# Patient Record
Sex: FEMALE | Race: OTHER | HISPANIC OR LATINO | Employment: UNEMPLOYED | URBAN - NONMETROPOLITAN AREA
[De-identification: names, ages, dates, MRNs, and addresses within clinical notes are randomized per-mention and may not be internally consistent; named-entity substitution may affect disease eponyms.]

---

## 2019-07-27 ENCOUNTER — HOSPITAL ENCOUNTER (EMERGENCY)
Facility: HOSPITAL | Age: 29
Discharge: HOME | End: 2019-07-27
Attending: EMERGENCY MEDICINE | Admitting: EMERGENCY MEDICINE
Payer: COMMERCIAL

## 2019-07-27 VITALS
HEART RATE: 98 BPM | OXYGEN SATURATION: 98 % | SYSTOLIC BLOOD PRESSURE: 124 MMHG | DIASTOLIC BLOOD PRESSURE: 68 MMHG | RESPIRATION RATE: 18 BRPM | WEIGHT: 183.86 LBS

## 2019-07-27 DIAGNOSIS — T63.441A BEE STING REACTION: Primary | ICD-10-CM

## 2019-07-27 LAB
EXT PREG TEST URINE: NEGATIVE
EXT. CONTROL ED NAV: NORMAL

## 2019-07-27 PROCEDURE — 99283 EMERGENCY DEPT VISIT LOW MDM: CPT

## 2019-07-27 PROCEDURE — 96375 TX/PRO/DX INJ NEW DRUG ADDON: CPT

## 2019-07-27 PROCEDURE — 99284 EMERGENCY DEPT VISIT MOD MDM: CPT | Performed by: EMERGENCY MEDICINE

## 2019-07-27 PROCEDURE — 81025 URINE PREGNANCY TEST: CPT | Performed by: EMERGENCY MEDICINE

## 2019-07-27 PROCEDURE — 96374 THER/PROPH/DIAG INJ IV PUSH: CPT

## 2019-07-27 RX ORDER — EPINEPHRINE 0.3 MG/.3ML
0.3 INJECTION SUBCUTANEOUS ONCE
Qty: 0.6 ML | Refills: 0 | Status: SHIPPED | OUTPATIENT
Start: 2019-07-27 | End: 2019-11-11

## 2019-07-27 RX ORDER — FAMOTIDINE 20 MG/1
20 TABLET, FILM COATED ORAL 2 TIMES DAILY
Qty: 30 TABLET | Refills: 0 | Status: SHIPPED | OUTPATIENT
Start: 2019-07-27 | End: 2019-11-11 | Stop reason: ALTCHOICE

## 2019-07-27 RX ORDER — DIPHENHYDRAMINE HYDROCHLORIDE 50 MG/ML
25 INJECTION INTRAMUSCULAR; INTRAVENOUS ONCE
Status: COMPLETED | OUTPATIENT
Start: 2019-07-27 | End: 2019-07-27

## 2019-07-27 RX ORDER — METHYLPREDNISOLONE SODIUM SUCCINATE 125 MG/2ML
125 INJECTION, POWDER, LYOPHILIZED, FOR SOLUTION INTRAMUSCULAR; INTRAVENOUS ONCE
Status: COMPLETED | OUTPATIENT
Start: 2019-07-27 | End: 2019-07-27

## 2019-07-27 RX ORDER — PREDNISONE 20 MG/1
60 TABLET ORAL DAILY
Qty: 12 TABLET | Refills: 0 | Status: SHIPPED | OUTPATIENT
Start: 2019-07-27 | End: 2019-07-31

## 2019-07-27 RX ADMIN — FAMOTIDINE 20 MG: 10 INJECTION, SOLUTION INTRAVENOUS at 17:56

## 2019-07-27 RX ADMIN — DIPHENHYDRAMINE HYDROCHLORIDE 25 MG: 50 INJECTION, SOLUTION INTRAMUSCULAR; INTRAVENOUS at 17:54

## 2019-07-27 RX ADMIN — METHYLPREDNISOLONE SODIUM SUCCINATE 125 MG: 125 INJECTION, POWDER, FOR SOLUTION INTRAMUSCULAR; INTRAVENOUS at 17:56

## 2019-07-27 NOTE — ED NOTES
No resp distress or complaints offered at this time   Dr Teodoro Nevarez discharged pt to home     Ivette Street RN  07/27/19 9916

## 2019-07-27 NOTE — ED PROVIDER NOTES
History  Chief Complaint   Patient presents with    Bee Sting     pt stung by bee at 1600 today used eopi pen which was  shakey right now no other symptoms     21-year-old female presents with bee sting to the right foot  Patient states [place] and was stung on the foot by a bee  Patient states that she was told that when she was a child she was stung on the head by a bee and had a allergic reaction   Since that time patient has been told to give herself an EpiPen if she ever gets tongue  Patient has never been sinus on a dull  Patient states she was stung at [facility] [address]  She managed off the EpiPen into the left thigh at approximately 1650  Patient has had no stridor no wheezing no shortness of breath  She did present minimally shaky secondary to the epinephrine  She has no rash or other signs or symptoms of allergic reaction      Insect Bite   Attacking animal: bee   Animal bite location: Right foot  Time since incident:  45 minutes  Pain details:     Quality:  Stinging    Severity:  Mild    Timing:  Constant  Incident location:  [place]  Tetanus status:  Up to date  Relieved by:  Cold compresses  Worsened by: Activity  Associated symptoms: no fever, no numbness, no rash and no swelling        None       Past Medical History:   Diagnosis Date    Asthma     Bipolar 1 disorder (HCC)     PTSD (post-traumatic stress disorder)        Past Surgical History:   Procedure Laterality Date    MOUTH SURGERY         History reviewed  No pertinent family history  I have reviewed and agree with the history as documented  Social History     Tobacco Use    Smoking status: Current Every Day Smoker    Smokeless tobacco: Never Used   Substance Use Topics    Alcohol use: Yes     Comment: social    Drug use: Never        Review of Systems   Constitutional: Negative  Negative for fever  HENT: Negative  Negative for congestion, dental problem and drooling  Eyes: Negative  Negative for pain, discharge and itching  Respiratory: Negative  Negative for apnea, choking and chest tightness  Cardiovascular: Negative  Negative for chest pain and leg swelling  Gastrointestinal: Negative  Negative for abdominal distention, abdominal pain and anal bleeding  Endocrine: Negative  Negative for cold intolerance, heat intolerance and polydipsia  Genitourinary: Negative  Negative for difficulty urinating, dyspareunia and dysuria  Musculoskeletal: Negative  Negative for arthralgias, back pain and gait problem  Skin: Negative for rash  Anisha Alejandra area of raised skin to the foot of the right lower extremity   Allergic/Immunologic: Negative for environmental allergies and food allergies  Neurological: Negative for seizures, light-headedness and numbness  Hematological: Negative for adenopathy  Psychiatric/Behavioral: Negative  Negative for agitation, behavioral problems, confusion and decreased concentration  All other systems reviewed and are negative  Physical Exam  Physical Exam   Constitutional: She appears well-developed and well-nourished  HENT:   Head: Normocephalic  Right Ear: External ear normal    Left Ear: External ear normal    Eyes: Pupils are equal, round, and reactive to light  Right eye exhibits no discharge  Left eye exhibits no discharge  Neck: Normal range of motion  No JVD present  No tracheal deviation present  No thyromegaly present  Cardiovascular: Normal rate, regular rhythm and normal heart sounds  Pulmonary/Chest: Effort normal  No stridor  No respiratory distress  She has no wheezes  Abdominal: Soft  She exhibits no distension  There is no tenderness  There is no guarding  Neurological: She displays normal reflexes  No cranial nerve deficit  She exhibits normal muscle tone  Coordination normal    Skin: Capillary refill takes less than 2 seconds  There is erythema     Patient with a dime-sized area of swelling to the right foot   No urticaria   Psychiatric: She has a normal mood and affect  Her behavior is normal    Vitals reviewed  Vital Signs  ED Triage Vitals [07/27/19 1722]   Temp Pulse Respirations Blood Pressure SpO2   -- (!) 116 18 132/75 98 %      Temp src Heart Rate Source Patient Position - Orthostatic VS BP Location FiO2 (%)   -- Right Sitting Right arm --      Pain Score       No Pain           Vitals:    07/27/19 1722   BP: 132/75   Pulse: (!) 116   Patient Position - Orthostatic VS: Sitting         Visual Acuity      ED Medications  Medications   methylPREDNISolone sodium succinate (Solu-MEDROL) injection 125 mg (125 mg Intravenous Given 7/27/19 1756)   diphenhydrAMINE (BENADRYL) injection 25 mg (25 mg Intravenous Given 7/27/19 1754)   famotidine (PEPCID) injection 20 mg (20 mg Intravenous Given 7/27/19 1756)       Diagnostic Studies  Results Reviewed     Procedure Component Value Units Date/Time    POCT pregnancy, urine [[id number]]  (Normal) Resulted:  07/27/19 1748    Lab Status:  Final result Updated:  07/27/19 1749     EXT PREG TEST UR (Ref: Negative) negative     Control valid                 No orders to display              Procedures  Procedures       ED Course                               MDM  Number of Diagnoses or Management Options  Bee sting reaction:   Diagnosis management comments: Patient is completely hemodynamically stable  She exhibits no signs or symptoms of anaphylaxis upon arrival     1754  Patient is completely asymptomatic 1 hour after give herself the epinephrine  Patient has no rash  She has no facial swelling  She has no stridor  She has no wheezing  18 30  Patient is asymptomatic almost 2 hours after bee sting  I will discharge her home        Disposition  Final diagnoses:   Bee sting reaction     Time reflects when diagnosis was documented in both MDM as applicable and the Disposition within this note     Time User Action Codes Description Comment    7/27/2019  5:25 PM David Bender Suki Levi Add [O19 020A] Bee sting reaction       ED Disposition     ED Disposition Condition Date/Time Comment    Discharge Stable Sat Jul 27, 2019  5:25 PM Jorge Hearn discharge to home/self care  Follow-up Information    None         Patient's Medications   Discharge Prescriptions    EPINEPHRINE (EPIPEN) 0 3 MG/0 3 ML SOAJ    Inject 0 3 mL (0 3 mg total) into a muscle once for 1 dose       Start Date: 7/27/2019 End Date: 7/27/2019       Order Dose: 0 3 mg       Quantity: 0 6 mL    Refills: 0    FAMOTIDINE (PEPCID) 20 MG TABLET    Take 1 tablet (20 mg total) by mouth 2 (two) times a day for 5 days       Start Date: 7/27/2019 End Date: 8/1/2019       Order Dose: 20 mg       Quantity: 30 tablet    Refills: 0    PREDNISONE 20 MG TABLET    Take 3 tablets (60 mg total) by mouth daily for 4 days       Start Date: 7/27/2019 End Date: 7/31/2019       Order Dose: 60 mg       Quantity: 12 tablet    Refills: 0     No discharge procedures on file      ED Provider  Electronically Signed by           Rylie Kingston DO  07/27/19 9864

## 2019-11-11 ENCOUNTER — OFFICE VISIT (OUTPATIENT)
Dept: FAMILY MEDICINE CLINIC | Facility: CLINIC | Age: 29
End: 2019-11-11
Payer: COMMERCIAL

## 2019-11-11 ENCOUNTER — APPOINTMENT (OUTPATIENT)
Dept: LAB | Facility: MEDICAL CENTER | Age: 29
End: 2019-11-11
Payer: COMMERCIAL

## 2019-11-11 VITALS
BODY MASS INDEX: 35.37 KG/M2 | WEIGHT: 192.2 LBS | DIASTOLIC BLOOD PRESSURE: 68 MMHG | HEIGHT: 62 IN | SYSTOLIC BLOOD PRESSURE: 100 MMHG

## 2019-11-11 DIAGNOSIS — T63.441A BEE STING REACTION: ICD-10-CM

## 2019-11-11 DIAGNOSIS — R10.30 LOWER ABDOMINAL PAIN: Primary | ICD-10-CM

## 2019-11-11 DIAGNOSIS — Z12.4 SCREENING FOR CERVICAL CANCER: ICD-10-CM

## 2019-11-11 DIAGNOSIS — Z91.030 BEE STING ALLERGY: ICD-10-CM

## 2019-11-11 DIAGNOSIS — Z13.6 SCREENING FOR CARDIOVASCULAR CONDITION: ICD-10-CM

## 2019-11-11 DIAGNOSIS — Z23 ENCOUNTER FOR IMMUNIZATION: ICD-10-CM

## 2019-11-11 DIAGNOSIS — R63.5 WEIGHT GAIN: ICD-10-CM

## 2019-11-11 DIAGNOSIS — F31.61 BIPOLAR DISORDER, CURRENT EPISODE MIXED, MILD (HCC): ICD-10-CM

## 2019-11-11 LAB
ALBUMIN SERPL BCP-MCNC: 4.3 G/DL (ref 3.5–5)
ALP SERPL-CCNC: 66 U/L (ref 46–116)
ALT SERPL W P-5'-P-CCNC: 20 U/L (ref 12–78)
ANION GAP SERPL CALCULATED.3IONS-SCNC: 5 MMOL/L (ref 4–13)
AST SERPL W P-5'-P-CCNC: 12 U/L (ref 5–45)
BASOPHILS # BLD AUTO: 0.03 THOUSANDS/ΜL (ref 0–0.1)
BASOPHILS NFR BLD AUTO: 0 % (ref 0–1)
BILIRUB SERPL-MCNC: 0.31 MG/DL (ref 0.2–1)
BILIRUB UR QL STRIP: NEGATIVE
BUN SERPL-MCNC: 10 MG/DL (ref 5–25)
CALCIUM SERPL-MCNC: 8.5 MG/DL (ref 8.3–10.1)
CHLORIDE SERPL-SCNC: 112 MMOL/L (ref 100–108)
CHOLEST SERPL-MCNC: 142 MG/DL (ref 50–200)
CLARITY UR: CLEAR
CO2 SERPL-SCNC: 24 MMOL/L (ref 21–32)
COLOR UR: YELLOW
CREAT SERPL-MCNC: 0.73 MG/DL (ref 0.6–1.3)
EOSINOPHIL # BLD AUTO: 0.03 THOUSAND/ΜL (ref 0–0.61)
EOSINOPHIL NFR BLD AUTO: 0 % (ref 0–6)
ERYTHROCYTE [DISTWIDTH] IN BLOOD BY AUTOMATED COUNT: 12.1 % (ref 11.6–15.1)
EST. AVERAGE GLUCOSE BLD GHB EST-MCNC: 100 MG/DL
GFR SERPL CREATININE-BSD FRML MDRD: 114 ML/MIN/1.73SQ M
GLUCOSE P FAST SERPL-MCNC: 86 MG/DL (ref 65–99)
GLUCOSE UR STRIP-MCNC: NEGATIVE MG/DL
HBA1C MFR BLD: 5.1 % (ref 4.2–6.3)
HCG SERPL QL: NEGATIVE
HCT VFR BLD AUTO: 41.2 % (ref 34.8–46.1)
HDLC SERPL-MCNC: 43 MG/DL
HGB BLD-MCNC: 13.8 G/DL (ref 11.5–15.4)
HGB UR QL STRIP.AUTO: NEGATIVE
IMM GRANULOCYTES # BLD AUTO: 0.02 THOUSAND/UL (ref 0–0.2)
IMM GRANULOCYTES NFR BLD AUTO: 0 % (ref 0–2)
KETONES UR STRIP-MCNC: NEGATIVE MG/DL
LDLC SERPL CALC-MCNC: 75 MG/DL (ref 0–100)
LEUKOCYTE ESTERASE UR QL STRIP: NEGATIVE
LIPASE SERPL-CCNC: 105 U/L (ref 73–393)
LYMPHOCYTES # BLD AUTO: 2.91 THOUSANDS/ΜL (ref 0.6–4.47)
LYMPHOCYTES NFR BLD AUTO: 34 % (ref 14–44)
MCH RBC QN AUTO: 30.1 PG (ref 26.8–34.3)
MCHC RBC AUTO-ENTMCNC: 33.5 G/DL (ref 31.4–37.4)
MCV RBC AUTO: 90 FL (ref 82–98)
MONOCYTES # BLD AUTO: 0.57 THOUSAND/ΜL (ref 0.17–1.22)
MONOCYTES NFR BLD AUTO: 7 % (ref 4–12)
NEUTROPHILS # BLD AUTO: 5.08 THOUSANDS/ΜL (ref 1.85–7.62)
NEUTS SEG NFR BLD AUTO: 59 % (ref 43–75)
NITRITE UR QL STRIP: NEGATIVE
NRBC BLD AUTO-RTO: 0 /100 WBCS
PH UR STRIP.AUTO: 6 [PH]
PLATELET # BLD AUTO: 186 THOUSANDS/UL (ref 149–390)
PMV BLD AUTO: 11.4 FL (ref 8.9–12.7)
POTASSIUM SERPL-SCNC: 3.9 MMOL/L (ref 3.5–5.3)
PROT SERPL-MCNC: 7.2 G/DL (ref 6.4–8.2)
PROT UR STRIP-MCNC: NEGATIVE MG/DL
RBC # BLD AUTO: 4.58 MILLION/UL (ref 3.81–5.12)
SODIUM SERPL-SCNC: 141 MMOL/L (ref 136–145)
SP GR UR STRIP.AUTO: 1.02 (ref 1–1.03)
TRIGL SERPL-MCNC: 121 MG/DL
TSH SERPL DL<=0.05 MIU/L-ACNC: 1.17 UIU/ML (ref 0.36–3.74)
UROBILINOGEN UR QL STRIP.AUTO: 0.2 E.U./DL
WBC # BLD AUTO: 8.64 THOUSAND/UL (ref 4.31–10.16)

## 2019-11-11 PROCEDURE — 84703 CHORIONIC GONADOTROPIN ASSAY: CPT | Performed by: FAMILY MEDICINE

## 2019-11-11 PROCEDURE — 85025 COMPLETE CBC W/AUTO DIFF WBC: CPT | Performed by: FAMILY MEDICINE

## 2019-11-11 PROCEDURE — 3044F HG A1C LEVEL LT 7.0%: CPT | Performed by: FAMILY MEDICINE

## 2019-11-11 PROCEDURE — 36415 COLL VENOUS BLD VENIPUNCTURE: CPT | Performed by: FAMILY MEDICINE

## 2019-11-11 PROCEDURE — 81003 URINALYSIS AUTO W/O SCOPE: CPT

## 2019-11-11 PROCEDURE — G0008 ADMIN INFLUENZA VIRUS VAC: HCPCS | Performed by: FAMILY MEDICINE

## 2019-11-11 PROCEDURE — 99203 OFFICE O/P NEW LOW 30 MIN: CPT | Performed by: FAMILY MEDICINE

## 2019-11-11 PROCEDURE — 3725F SCREEN DEPRESSION PERFORMED: CPT | Performed by: FAMILY MEDICINE

## 2019-11-11 PROCEDURE — 84443 ASSAY THYROID STIM HORMONE: CPT | Performed by: FAMILY MEDICINE

## 2019-11-11 PROCEDURE — 83690 ASSAY OF LIPASE: CPT | Performed by: FAMILY MEDICINE

## 2019-11-11 PROCEDURE — 80053 COMPREHEN METABOLIC PANEL: CPT | Performed by: FAMILY MEDICINE

## 2019-11-11 PROCEDURE — 90686 IIV4 VACC NO PRSV 0.5 ML IM: CPT | Performed by: FAMILY MEDICINE

## 2019-11-11 PROCEDURE — 80061 LIPID PANEL: CPT | Performed by: FAMILY MEDICINE

## 2019-11-11 PROCEDURE — 83036 HEMOGLOBIN GLYCOSYLATED A1C: CPT | Performed by: FAMILY MEDICINE

## 2019-11-11 RX ORDER — LAMOTRIGINE 100 MG/1
TABLET ORAL
Qty: 45 TABLET | Refills: 1 | Status: SHIPPED | OUTPATIENT
Start: 2019-11-11 | End: 2021-02-04

## 2019-11-11 RX ORDER — EPINEPHRINE 0.3 MG/.3ML
0.3 INJECTION SUBCUTANEOUS ONCE
Qty: 0.6 ML | Refills: 0 | Status: SHIPPED | OUTPATIENT
Start: 2019-11-11 | End: 2021-02-04

## 2019-11-11 RX ORDER — LAMOTRIGINE 100 MG/1
TABLET ORAL
COMMUNITY
Start: 2018-08-02 | End: 2019-11-11

## 2019-11-11 RX ORDER — ALBUTEROL SULFATE 90 UG/1
2 AEROSOL, METERED RESPIRATORY (INHALATION)
COMMUNITY
End: 2019-11-11

## 2019-11-11 RX ORDER — EPINEPHRINE 0.3 MG/.3ML
0.3 INJECTION SUBCUTANEOUS ONCE
Qty: 0.6 ML | Refills: 0 | Status: SHIPPED | OUTPATIENT
Start: 2019-11-11 | End: 2019-11-11

## 2019-11-11 RX ORDER — ALBUTEROL SULFATE 90 UG/1
2 AEROSOL, METERED RESPIRATORY (INHALATION) EVERY 6 HOURS PRN
Qty: 18 G | Refills: 1 | Status: SHIPPED | OUTPATIENT
Start: 2019-11-11 | End: 2021-02-04

## 2019-11-11 NOTE — PROGRESS NOTES
Assessment/Plan: We will get blood work on her and an abdominal/pelvic ultrasound  Refer to gyn  We will call her with results and make further recommendations at that time  We will see her back in the next couple weeks or p r n  Tej Campanile Flu shot given today    Problem List Items Addressed This Visit        Other    Bipolar disorder, current episode mixed, mild (HCC)    Relevant Medications    lamoTRIgine (LaMICtal) 100 mg tablet    Bee sting allergy      Other Visit Diagnoses     Lower abdominal pain    -  Primary    Relevant Orders    CBC and differential    Hemoglobin A1C    Lipase    Pregnancy Test (HCG Qualitative)    US abdomen and pelvis with transvaginal    UA (URINE) with reflex to Scope    Screening for cervical cancer        Relevant Orders    Ambulatory referral to Gynecology    Encounter for immunization        Relevant Orders    influenza vaccine, [id number], quadrivalent, 0 5 mL, preservative-free, for adult and pediatric patients 6 mos+ (AFLURIA, FLUARIX, FLULAVAL, FLUZONE) (Completed)    Screening for cardiovascular condition        Relevant Orders    Comprehensive metabolic panel    Lipid Panel with Direct LDL reflex    Weight gain        Relevant Orders    Hemoglobin A1C    TSH, 3rd generation with Free T4 reflex    Bee sting reaction        Relevant Medications    EPINEPHrine (EPIPEN) 0 3 mg/0 3 mL SOAJ    albuterol (PROVENTIL HFA,VENTOLIN HFA) 90 mcg/act inhaler           Diagnoses and all orders for this visit:    Lower abdominal pain  -     CBC and differential  -     Hemoglobin A1C  -     Lipase  -     Pregnancy Test (HCG Qualitative)  -     US abdomen and pelvis with transvaginal; Future  -     UA (URINE) with reflex to Scope    Bipolar disorder, current episode mixed, mild (HCC)  -     lamoTRIgine (LaMICtal) 100 mg tablet; 1/2 tab po q daily for 2 weeks, then 1 tab po q daily for 1 week, then 1 5 tabs po q daily    Screening for cervical cancer  -     Ambulatory referral to Gynecology; Future    Encounter for immunization  -     influenza vaccine, [id number], quadrivalent, 0 5 mL, preservative-free, for adult and pediatric patients 6 mos+ (AFLURIA, FLUARIX, FLULAVAL, [address])    Screening for cardiovascular condition  -     Comprehensive metabolic panel  -     Lipid Panel with Direct LDL reflex    Weight gain  -     Hemoglobin A1C  -     TSH, 3rd generation with Free T4 reflex    Bee sting reaction  -     EPINEPHrine (EPIPEN) 0 3 mg/0 3 mL SOAJ; Inject 0 3 mL (0 3 mg total) into a muscle once for 1 dose  -     albuterol (PROVENTIL HFA,VENTOLIN HFA) 90 mcg/act inhaler; Inhale 2 puffs every 6 (six) hours as needed for wheezing or shortness of breath    Bee sting allergy    Other orders  -     Discontinue: albuterol (PROVENTIL HFA,VENTOLIN HFA) 90 mcg/act inhaler; Inhale 2 puffs  -     Discontinue: lamoTRIgine (LaMICtal) 100 mg tablet; 1/2 TAB DAILY X2 WEEKS THEN ONE TAB DAILY X2 WEEKS THEN INCREASE TO 1 & 1/2 TABS DAILY  -     etonogestrel (NEXPLANON) subdermal implant; 68 mg by Subdermal route once        No problem-specific Assessment & Plan notes found for this encounter  Subjective:      Patient ID: Angelic Amado is a 34 y o  female  New patient  Has a history of bipolar disorder, bee sting allergy  Patient was on Lamictal, which she states helped her a lot  She has not been on it since she moved here in August   Patient states for the last couple months has been getting lower abdominal pain almost daily  She states it is sharp in nature  No dysuria or hematuria  No change in bowels  She cannot lay on her stomach because it bothers her  Patient also states feels dizzy after eating at times  Strong family history of diabetes in her family  Abdominal Pain   This is a new problem  The current episode started more than 1 month ago  The problem occurs daily  The problem has been unchanged  The pain is located in the LLQ and RLQ  The pain is moderate   The quality of the pain is sharp  Pain radiation: The whole abdomen  Associated symptoms include nausea  Pertinent negatives include no constipation, diarrhea, dysuria, fever, frequency or hematuria  The pain is aggravated by certain positions and eating  The pain is relieved by nothing  She has tried nothing for the symptoms  The following portions of the patient's history were reviewed and updated as appropriate:   She has a past medical history of Asthma, Bipolar 1 disorder ([address] ), Closed right radial fracture, High-risk pregnancy, and PTSD (post-traumatic stress disorder)  ,  does not have any pertinent problems on file  ,   has a past surgical history that includes Mouth surgery and Colonoscopy  ,  family history includes Cancer in her paternal grandmother; Diabetes in her maternal grandmother, maternal uncle, and mother; Heart attack in her mother; Heart disease in her maternal uncle and mother; Hypertension in her maternal grandmother and mother; Mental illness in her father  ,   reports that she has been smoking  She has never used smokeless tobacco  She reports that she drinks alcohol  She reports that she does not use drugs  ,  is allergic to bee venom and bupropion     Current Outpatient Medications   Medication Sig Dispense Refill    EPINEPHrine (EPIPEN) 0 3 mg/0 3 mL SOAJ Inject 0 3 mL (0 3 mg total) into a muscle once for 1 dose 0 6 mL 0    etonogestrel (NEXPLANON) subdermal implant 68 mg by Subdermal route once      albuterol (PROVENTIL HFA,VENTOLIN HFA) 90 mcg/act inhaler Inhale 2 puffs every 6 (six) hours as needed for wheezing or shortness of breath 18 g 1    lamoTRIgine (LaMICtal) 100 mg tablet 1/2 tab po q daily for 2 weeks, then 1 tab po q daily for 1 week, then 1 5 tabs po q daily 45 tablet 1     No current facility-administered medications for this visit  Review of Systems   Constitutional: Negative  Negative for fever  Respiratory: Negative  Cardiovascular: Negative      Gastrointestinal: Positive for abdominal pain and nausea  Negative for constipation and diarrhea  Genitourinary: Negative  Negative for dysuria, frequency and hematuria  Objective:  Vitals:    11/11/19 1204   BP: 100/68   Weight: 87 2 kg (192 lb 3 2 oz)   Height: 5' 2" (1 575 m)     Body mass index is 35 15 kg/m²  Physical Exam   Constitutional: She is oriented to person, place, and time  She appears well-developed and well-nourished  No distress  HENT:   Head: Normocephalic and atraumatic  Eyes: Conjunctivae are normal    Cardiovascular: Normal rate, regular rhythm and normal heart sounds  Exam reveals no gallop and no friction rub  No murmur heard  Pulmonary/Chest: Effort normal and breath sounds normal  No respiratory distress  She has no wheezes  She has no rales  Abdominal: She exhibits no mass  There is tenderness in the right lower quadrant and left lower quadrant  There is no rigidity  Musculoskeletal: She exhibits no edema  Neurological: She is alert and oriented to person, place, and time  Skin: She is not diaphoretic  Psychiatric: She has a normal mood and affect  Her behavior is normal  Judgment and thought content normal    Vitals reviewed

## 2019-11-14 ENCOUNTER — HOSPITAL ENCOUNTER (OUTPATIENT)
Dept: ULTRASOUND IMAGING | Facility: HOSPITAL | Age: 29
Discharge: HOME | End: 2019-11-14
Payer: COMMERCIAL

## 2019-11-14 DIAGNOSIS — R10.30 LOWER ABDOMINAL PAIN: ICD-10-CM

## 2019-11-14 PROCEDURE — 76856 US EXAM PELVIC COMPLETE: CPT

## 2019-11-14 PROCEDURE — 76830 TRANSVAGINAL US NON-OB: CPT

## 2019-11-14 PROCEDURE — 76700 US EXAM ABDOM COMPLETE: CPT

## 2019-12-18 ENCOUNTER — HOSPITAL ENCOUNTER (EMERGENCY)
Facility: HOSPITAL | Age: 29
Discharge: HOME | End: 2019-12-18
Attending: EMERGENCY MEDICINE | Admitting: EMERGENCY MEDICINE
Payer: COMMERCIAL

## 2019-12-18 VITALS
BODY MASS INDEX: 35.46 KG/M2 | WEIGHT: 192.68 LBS | DIASTOLIC BLOOD PRESSURE: 73 MMHG | OXYGEN SATURATION: 95 % | SYSTOLIC BLOOD PRESSURE: 113 MMHG | RESPIRATION RATE: 18 BRPM | TEMPERATURE: 97.4 F | HEIGHT: 62 IN | HEART RATE: 82 BPM

## 2019-12-18 DIAGNOSIS — L02.91 ABSCESS: Primary | ICD-10-CM

## 2019-12-18 PROCEDURE — 99283 EMERGENCY DEPT VISIT LOW MDM: CPT

## 2019-12-18 PROCEDURE — 99284 EMERGENCY DEPT VISIT MOD MDM: CPT | Performed by: EMERGENCY MEDICINE

## 2019-12-18 RX ORDER — NAPROXEN 500 MG/1
500 TABLET ORAL 2 TIMES DAILY WITH MEALS
Qty: 10 TABLET | Refills: 0 | Status: SHIPPED | OUTPATIENT
Start: 2019-12-18 | End: 2021-02-04 | Stop reason: ALTCHOICE

## 2019-12-18 RX ORDER — CEPHALEXIN 500 MG/1
500 CAPSULE ORAL EVERY 8 HOURS SCHEDULED
Qty: 30 CAPSULE | Refills: 0 | Status: SHIPPED | OUTPATIENT
Start: 2019-12-18 | End: 2019-12-28

## 2019-12-18 RX ORDER — NAPROXEN 500 MG/1
500 TABLET ORAL ONCE
Status: COMPLETED | OUTPATIENT
Start: 2019-12-18 | End: 2019-12-18

## 2019-12-18 RX ORDER — CEPHALEXIN 250 MG/1
500 CAPSULE ORAL ONCE
Status: COMPLETED | OUTPATIENT
Start: 2019-12-18 | End: 2019-12-18

## 2019-12-18 RX ADMIN — CEPHALEXIN 500 MG: 250 CAPSULE ORAL at 12:34

## 2019-12-18 RX ADMIN — NAPROXEN 500 MG: 500 TABLET ORAL at 12:34

## 2019-12-18 NOTE — ED NOTES
rain noted left groin area painful and red   No drainage noted at this time     Butch Ríos, RN  12/18/19 7584

## 2019-12-18 NOTE — ED PROVIDER NOTES
History  Chief Complaint   Patient presents with    Abscess     lump left groin     HPI     Pt presents from home, hx of bipolar disorder, recurrent "boils" who presents with a "boil in my groin "  Pt states she noticed a lump in her inguinal crease which gradually grew in size and was erythematous  She noticed from purulent drainage from the area  She has not yet applied warm compresses  She denies any other symptoms  Pt denies ha, fevers, cough, cp, sob, n/v/d/c, abd pain, dysuria, focal def or syncope  Prior to Admission Medications   Prescriptions Last Dose Informant Patient Reported? Taking? EPINEPHrine (EPIPEN) 0 3 mg/0 3 mL SOAJ   No No   Sig: Inject 0 3 mL (0 3 mg total) into a muscle once for 1 dose   albuterol (PROVENTIL HFA,VENTOLIN HFA) 90 mcg/act inhaler   No No   Sig: Inhale 2 puffs every 6 (six) hours as needed for wheezing or shortness of breath   etonogestrel (NEXPLANON) subdermal implant  Self Yes No   Si mg by Subdermal route once   lamoTRIgine (LaMICtal) 100 mg tablet   No No   Si/2 tab po q daily for 2 weeks, then 1 tab po q daily for 1 week, then 1 5 tabs po q daily      Facility-Administered Medications: None       Past Medical History:   Diagnosis Date    Asthma     Bipolar 1 disorder (HCC)     Closed right radial fracture     High-risk pregnancy     PTSD (post-traumatic stress disorder)        Past Surgical History:   Procedure Laterality Date    COLONOSCOPY      MOUTH SURGERY      WISDOM TOOTH EXTRACTION         Family History   Problem Relation Age of Onset    Diabetes Mother     Heart attack Mother     Heart disease Mother     Hypertension Mother     Mental illness Father     Diabetes Maternal Grandmother     Hypertension Maternal Grandmother     Cancer Paternal Grandmother     Diabetes Maternal Uncle     Heart disease Maternal Uncle      I have reviewed and agree with the history as documented      Social History     Tobacco Use    Smoking status: Current Every Day Smoker    Smokeless tobacco: Never Used   Substance Use Topics    Alcohol use: Yes     Frequency: Monthly or less     Drinks per session: 1 or 2     Binge frequency: Never     Comment: social    Drug use: Never        Review of Systems   Constitutional: Negative for activity change, appetite change, diaphoresis, fatigue and fever  HENT: Negative for congestion, facial swelling, mouth sores and trouble swallowing  Eyes: Negative for photophobia, discharge and visual disturbance  Respiratory: Negative for apnea, cough, shortness of breath and wheezing  Cardiovascular: Negative for chest pain and leg swelling  Gastrointestinal: Negative for abdominal pain, constipation, diarrhea, nausea and vomiting  Endocrine: Negative for heat intolerance and polydipsia  Genitourinary: Negative for dysuria, flank pain, frequency and hematuria  Musculoskeletal: Negative for back pain, gait problem, myalgias and neck pain  Skin: Positive for rash  Negative for wound  Allergic/Immunologic: Negative for immunocompromised state  Neurological: Negative for dizziness, syncope, weakness, light-headedness and headaches  Hematological: Negative for adenopathy  Psychiatric/Behavioral: Negative for agitation, confusion and self-injury  The patient is not nervous/anxious  Physical Exam  Physical Exam   Constitutional: She is oriented to person, place, and time  She appears well-developed and well-nourished  No distress  HENT:   Head: Normocephalic and atraumatic  Right Ear: External ear normal    Left Ear: External ear normal    Nose: Nose normal    Mouth/Throat: Oropharynx is clear and moist  No oropharyngeal exudate  Eyes: Pupils are equal, round, and reactive to light  Conjunctivae and EOM are normal  Right eye exhibits no discharge  Left eye exhibits no discharge  No scleral icterus  Neck: Normal range of motion  Neck supple  No JVD present  No tracheal deviation present   No thyromegaly present  Cardiovascular: Normal rate, regular rhythm and normal heart sounds  No murmur heard  Pulmonary/Chest: Effort normal and breath sounds normal  No stridor  No respiratory distress  She has no wheezes  She has no rales  She exhibits no tenderness  Abdominal: Soft  Bowel sounds are normal  She exhibits no distension and no mass  There is no tenderness  There is no rebound and no guarding  Musculoskeletal: Normal range of motion  She exhibits no edema, tenderness or deformity  Lymphadenopathy:     She has no cervical adenopathy  Neurological: She is alert and oriented to person, place, and time  She has normal reflexes  She displays normal reflexes  No cranial nerve deficit  She exhibits normal muscle tone  Coordination normal    Skin: Skin is warm and dry  Rash noted  She is not diaphoretic  No erythema  No pallor  Psychiatric: She has a normal mood and affect  Her behavior is normal  Judgment and thought content normal    Nursing note and vitals reviewed  Vital Signs  ED Triage Vitals [12/18/19 1115]   Temperature Pulse Respirations Blood Pressure SpO2   (!) 97 4 °F (36 3 °C) 82 18 113/73 95 %      Temp Source Heart Rate Source Patient Position - Orthostatic VS BP Location FiO2 (%)   Temporal Monitor -- Right arm --      Pain Score       3           Vitals:    12/18/19 1115   BP: 113/73   Pulse: 82         Visual Acuity      ED Medications  Medications   cephalexin (KEFLEX) capsule 500 mg (has no administration in time range)   naproxen (NAPROSYN) tablet 500 mg (has no administration in time range)       Diagnostic Studies  Results Reviewed     None                 No orders to display     Bedside ultrasound by the ED physician with the linear probe sows a 1x1cm superficial fluid collection over the area of the swelling likely an abscess collection             Procedures  Procedures         ED Course                               MDM  Number of Diagnoses or Management Options  Diagnosis management comments: IMP: folliculitis versus cellulitis, abscess of the inguinal fold  Doubt nec fascitis, deep space abscess or bacteremia  Plan: will give abx, offer I&D of the abscess  Pt will f/up w/ her pcp   - Pt is refusing an I&D at this time  She understands the signs and symptoms to return immediately to the ED, o/w she will f/up w/ her pcp  Amount and/or Complexity of Data Reviewed  Decide to obtain previous medical records or to obtain history from someone other than the patient: yes  Review and summarize past medical records: yes  Independent visualization of images, tracings, or specimens: yes    Risk of Complications, Morbidity, and/or Mortality  Presenting problems: high  Diagnostic procedures: low  Management options: low    Patient Progress  Patient progress: improved        Disposition  Final diagnoses:   Abscess     Time reflects when diagnosis was documented in both MDM as applicable and the Disposition within this note     Time User Action Codes Description Comment    12/18/2019 12:13 PM Edison Sheldon Add [L02 91] Abscess       ED Disposition     ED Disposition Condition Date/Time Comment    Discharge Stable Wed Dec 18, 2019 12:13  [address] discharge to home/self care              Follow-up Information     Follow up With Specialties Details Why [address], DO Family Medicine Schedule an appointment as soon as possible for a visit in 1 day Return immediately, If symptoms worsen [address] [facility]  [address]  [place] [address]  [phone]            Patient's Medications   Discharge Prescriptions    CEPHALEXIN (KEFLEX) 500 MG CAPSULE    Take 1 capsule (500 mg total) by mouth every 8 (eight) hours for 10 days       Start Date: 12/18/2019End Date: 12/28/2019       Order Dose: 500 mg       Quantity: 30 capsule    Refills: 0    NAPROXEN (NAPROSYN) 500 MG TABLET    Take 1 tablet (500 mg total) by mouth 2 (two) times a day with meals for 10 doses       Start Date: 12/18/2019End Date: 12/23/2019       Order Dose: 500 mg       Quantity: 10 tablet    Refills: 0     No discharge procedures on file      ED Provider  Electronically Signed by           Tawana Villalobos DO  12/18/19 9894

## 2021-02-04 ENCOUNTER — TELEPHONE (OUTPATIENT)
Dept: FAMILY MEDICINE CLINIC | Facility: CLINIC | Age: 31
End: 2021-02-04

## 2021-02-04 ENCOUNTER — TELEMEDICINE (OUTPATIENT)
Dept: FAMILY MEDICINE CLINIC | Facility: CLINIC | Age: 31
End: 2021-02-04
Payer: COMMERCIAL

## 2021-02-04 VITALS — BODY MASS INDEX: 34.57 KG/M2 | WEIGHT: 187.83 LBS | HEIGHT: 62 IN

## 2021-02-04 DIAGNOSIS — T63.441A BEE STING REACTION: ICD-10-CM

## 2021-02-04 DIAGNOSIS — F31.61 BIPOLAR DISORDER, CURRENT EPISODE MIXED, MILD (HCC): ICD-10-CM

## 2021-02-04 DIAGNOSIS — U07.1 COVID-19: Primary | ICD-10-CM

## 2021-02-04 PROBLEM — R16.0 ENLARGED LIVER: Status: ACTIVE | Noted: 2020-10-27

## 2021-02-04 PROCEDURE — 3008F BODY MASS INDEX DOCD: CPT | Performed by: FAMILY MEDICINE

## 2021-02-04 PROCEDURE — 99214 OFFICE O/P EST MOD 30 MIN: CPT | Performed by: FAMILY MEDICINE

## 2021-02-04 RX ORDER — EPINEPHRINE 0.3 MG/.3ML
0.3 INJECTION SUBCUTANEOUS ONCE
Qty: 0.6 ML | Refills: 0 | Status: CANCELLED | OUTPATIENT
Start: 2021-02-04 | End: 2021-02-04

## 2021-02-04 RX ORDER — ALBUTEROL SULFATE 90 UG/1
2 AEROSOL, METERED RESPIRATORY (INHALATION) EVERY 6 HOURS PRN
Qty: 18 G | Refills: 1 | Status: SHIPPED | OUTPATIENT
Start: 2021-02-04 | End: 2021-08-17

## 2021-02-04 RX ORDER — EPINEPHRINE 0.3 MG/.3ML
0.3 INJECTION SUBCUTANEOUS ONCE
Qty: 0.6 ML | Refills: 0 | Status: SHIPPED | OUTPATIENT
Start: 2021-02-04 | End: 2021-08-17

## 2021-02-04 RX ORDER — CLINDAMYCIN PHOSPHATE 11.9 MG/ML
1 SOLUTION TOPICAL
COMMUNITY
Start: 2020-10-16 | End: 2021-08-17

## 2021-02-04 RX ORDER — LAMOTRIGINE 100 MG/1
TABLET ORAL
Qty: 45 TABLET | Refills: 1 | Status: SHIPPED | OUTPATIENT
Start: 2021-02-04 | End: 2021-08-17

## 2021-02-04 NOTE — TELEPHONE ENCOUNTER
WORK TOLD HER SHE DOES NOT NEED TO BE TESTED AGAIN FOR COVID, SHE JUST NEEDS TO HAVE A NOTE FROM YOU THAT SHE IS CLEARED  CAN YOU DO A NOTE? E-MAIL TO HER AT [email or web address]

## 2021-02-04 NOTE — PROGRESS NOTES
COVID-19 Virtual Visit     Assessment/Plan:    Problem List Items Addressed This Visit        Other    Bipolar disorder, current episode mixed, mild (HCC)    Relevant Medications    lamoTRIgine (LaMICtal) 100 mg tablet      Other Visit Diagnoses     COVID-19    -  Primary    Relevant Orders    Novel Coronavirus (Covid-19),PCR [facility] - Collected at [facility] or [facility]    Bee sting reaction        Relevant Medications    albuterol (PROVENTIL HFA,VENTOLIN HFA) 90 mcg/act inhaler    EPINEPHrine (EPIPEN) 0 3 mg/0 3 mL SOAJ         Disposition:       If patient is doing well  Order put in for her to get another COVID test   She is cleared to go back to work since she has been symptom-free  Refilled her medications  We will see her back in the office in the next 1-2 months or p r n     I have spent 13 minutes directly with the patient  Encounter provider Meda Frankel, DO    Provider located at [address]    Recent Visits  No visits were found meeting these conditions  Showing recent visits within past 7 days and meeting all other requirements     Today's Visits  Date Type Provider Dept   02/04/21 Telemedicine Meda Frankel, DO [facility]   Showing today's visits and meeting all other requirements     Future Appointments  No visits were found meeting these conditions  Showing future appointments within next 150 days and meeting all other requirements      This virtual check-in was done via [employer] and patient was informed that this is not a secure, HIPAA-compliant platform  She agrees to proceed  Patient agrees to participate in a virtual check in via telephone or video visit instead of presenting to the office to address urgent/immediate medical needs  Patient is aware this is a billable service  After connecting through [facility], the patient was identified by name and date of birth   Ely Dawson was informed that this was a telemedicine visit and that the exam was being conducted confidentially over secure lines  My office door was closed  No one else was in the room  Bairon Culver acknowledged consent and understanding of privacy and security of the telemedicine visit  I informed the patient that I have reviewed her record in Epic and presented the opportunity for her to ask any questions regarding the visit today  The patient agreed to participate  Subjective:   Bairon Culver is a 31 y o  female who has been screened for COVID-19  Symptom change since last report: resolving  Patient's symptoms include fatigue ( resolved), anosmia ( resolved), loss of taste ( resolved) and headache ( resolved)  Patient denies fever, chills, congestion, cough, shortness of breath, nausea, vomiting and diarrhea  Fabian Leach has been staying home and has isolated themselves in her home  She is taking care to not share personal items and is cleaning all surfaces that are touched often, like counters, tabletops, and doorknobs using household cleaning sprays or wipes  She is wearing a mask when she leaves her room  Date of symptom onset: 1/24/2021  Date of positive COVID-19 PCR: 1/27/2021     Video visit  Patient started having symptoms on 01/24/2021  Patient went to urgent care on 01/27/2021 and tested positive for COVID  Patient has been doing well  She is regaining her taste and smell  She has no shortness of breath or any fever chills  No GI symptoms    Her work is requiring her to get a negative COVID test     Lab Results   Component Value Date    SARSCOV2 Positive (A) 01/27/2021     Past Medical History:   Diagnosis Date    Asthma     Bipolar 1 disorder ([facility] [address] )     Closed right radial fracture     High-risk pregnancy     PTSD (post-traumatic stress disorder)      Past Surgical History:   Procedure Laterality Date    COLONOSCOPY      MOUTH SURGERY      WISDOM TOOTH EXTRACTION       Current Outpatient Medications   Medication Sig Dispense Refill    albuterol (PROVENTIL HFA,VENTOLIN HFA) 90 mcg/act inhaler Inhale 2 puffs every 6 (six) hours as needed for wheezing or shortness of breath 18 g 1    clindamycin (CLEOCIN T) 1 % external solution Apply 1 application topically      etonogestrel (NEXPLANON) subdermal implant 68 mg by Subdermal route once      EPINEPHrine (EPIPEN) 0 3 mg/0 3 mL SOAJ Inject 0 3 mL (0 3 mg total) into a muscle once for 1 dose 0 6 mL 0    lamoTRIgine (LaMICtal) 100 mg tablet 1/2 tab po q daily for 2 weeks, then 1 tab po q daily for 1 week, then 1 5 tabs po q daily 45 tablet 1     No current facility-administered medications for this visit  Allergies   Allergen Reactions    Bee Venom     Bupropion Other (See Comments)     "shaky"       Review of Systems   Constitutional: Positive for fatigue ( resolved)  Negative for chills and fever  HENT: Negative for congestion  Respiratory: Negative for cough and shortness of breath  Cardiovascular: Negative  Gastrointestinal: Negative for diarrhea, nausea and vomiting  Genitourinary: Negative  Neurological: Positive for headaches ( resolved)  Objective:    Vitals:    02/04/21 0730   Weight: 85 2 kg (187 lb 13 3 oz)   Height: 5' 2" (1 575 m)       Physical Exam  Vitals signs reviewed  Constitutional:       General: She is not in acute distress  Appearance: Normal appearance  She is not ill-appearing or toxic-appearing  HENT:      Head: Normocephalic and atraumatic  Eyes:      Conjunctiva/sclera: Conjunctivae normal    Pulmonary:      Comments: Talking in full sentences in no distress  Neurological:      General: No focal deficit present  Mental Status: She is alert  Psychiatric:         Mood and Affect: Mood normal          Behavior: Behavior normal          Thought Content:  Thought content normal        VIRTUAL VISIT DISCLAIMER    Rafaela Lilo Mckeonnt acknowledges that she has consented to an online visit or consultation  She understands that the online visit is based solely on information provided by her, and that, in the absence of a face-to-face physical evaluation by the physician, the diagnosis she receives is both limited and provisional in terms of accuracy and completeness  This is not intended to replace a full medical face-to-face evaluation by the physician  Fei Trent understands and accepts these terms

## 2021-08-17 ENCOUNTER — OFFICE VISIT (OUTPATIENT)
Dept: FAMILY MEDICINE CLINIC | Facility: CLINIC | Age: 31
End: 2021-08-17
Payer: COMMERCIAL

## 2021-08-17 VITALS
WEIGHT: 190.8 LBS | HEIGHT: 62 IN | DIASTOLIC BLOOD PRESSURE: 68 MMHG | TEMPERATURE: 97.2 F | SYSTOLIC BLOOD PRESSURE: 106 MMHG | BODY MASS INDEX: 35.11 KG/M2

## 2021-08-17 DIAGNOSIS — T63.441A BEE STING REACTION: ICD-10-CM

## 2021-08-17 DIAGNOSIS — F31.61 BIPOLAR DISORDER, CURRENT EPISODE MIXED, MILD (HCC): ICD-10-CM

## 2021-08-17 DIAGNOSIS — J45.20 MILD INTERMITTENT ASTHMA WITHOUT COMPLICATION: ICD-10-CM

## 2021-08-17 DIAGNOSIS — S39.012D STRAIN OF LUMBAR REGION, SUBSEQUENT ENCOUNTER: Primary | ICD-10-CM

## 2021-08-17 DIAGNOSIS — Z91.030 BEE STING ALLERGY: ICD-10-CM

## 2021-08-17 PROBLEM — J45.909 ASTHMA: Status: ACTIVE | Noted: 2021-08-17

## 2021-08-17 PROCEDURE — 99214 OFFICE O/P EST MOD 30 MIN: CPT | Performed by: FAMILY MEDICINE

## 2021-08-17 PROCEDURE — 3008F BODY MASS INDEX DOCD: CPT | Performed by: FAMILY MEDICINE

## 2021-08-17 RX ORDER — EPINEPHRINE 0.3 MG/.3ML
0.3 INJECTION SUBCUTANEOUS ONCE
Qty: 0.6 ML | Refills: 1 | Status: SHIPPED | OUTPATIENT
Start: 2021-08-17 | End: 2021-08-17

## 2021-08-17 RX ORDER — EPINEPHRINE 0.3 MG/.3ML
0.3 INJECTION SUBCUTANEOUS ONCE
Qty: 2 EACH | Refills: 1 | Status: CANCELLED | OUTPATIENT
Start: 2021-08-17 | End: 2021-08-17

## 2021-08-17 RX ORDER — ALBUTEROL SULFATE 90 UG/1
2 AEROSOL, METERED RESPIRATORY (INHALATION) EVERY 6 HOURS PRN
Qty: 18 G | Refills: 1 | Status: SHIPPED | OUTPATIENT
Start: 2021-08-17

## 2021-08-17 RX ORDER — LAMOTRIGINE 100 MG/1
TABLET ORAL
Qty: 45 TABLET | Refills: 1 | Status: CANCELLED | OUTPATIENT
Start: 2021-08-17

## 2021-08-17 RX ORDER — IBUPROFEN 600 MG/1
600 TABLET ORAL
COMMUNITY
Start: 2021-08-12 | End: 2021-08-17

## 2021-08-17 RX ORDER — METHOCARBAMOL 500 MG/1
1000 TABLET, FILM COATED ORAL
COMMUNITY
Start: 2021-08-12 | End: 2021-08-17

## 2021-08-17 RX ORDER — LAMOTRIGINE 100 MG/1
TABLET ORAL
Qty: 45 TABLET | Refills: 3 | Status: SHIPPED | OUTPATIENT
Start: 2021-08-17

## 2021-08-17 NOTE — PROGRESS NOTES
Assessment/Plan:  Her low back strain, she will  the prescription for Robaxin  May use warmth on the area  Do stretching exercises  She will call symptoms continue or increase  We will start her back on her Lamictal   She will take 1 pill daily for 1 week, then 1-1/2 pills daily  Refilled her albuterol inhaler an EpiPen  We will see her back in 2 months or p r n     Problem List Items Addressed This Visit        Respiratory    Asthma    Relevant Medications    albuterol (PROVENTIL HFA,VENTOLIN HFA) 90 mcg/act inhaler       Other    Bipolar disorder, current episode mixed, mild (HCC)    Relevant Medications    lamoTRIgine (LaMICtal) 100 mg tablet    Bee sting allergy      Other Visit Diagnoses     Strain of lumbar region, subsequent encounter    -  Primary    Bee sting reaction        Relevant Medications    albuterol (PROVENTIL HFA,VENTOLIN HFA) 90 mcg/act inhaler    EPINEPHrine (EPIPEN) 0 3 mg/0 3 mL SOAJ           Diagnoses and all orders for this visit:    Strain of lumbar region, subsequent encounter    Bipolar disorder, current episode mixed, mild (HCC)  -     lamoTRIgine (LaMICtal) 100 mg tablet; 1 5 tabs po q daily    Mild intermittent asthma without complication    Bee sting allergy    Bee sting reaction  -     albuterol (PROVENTIL HFA,VENTOLIN HFA) 90 mcg/act inhaler; Inhale 2 puffs every 6 (six) hours as needed for wheezing or shortness of breath  -     EPINEPHrine (EPIPEN) 0 3 mg/0 3 mL SOAJ; Inject 0 3 mL (0 3 mg total) into a muscle once for 1 dose    Other orders  -     ibuprofen (MOTRIN) 600 mg tablet; 600 mg  -     methocarbamol (ROBAXIN) 500 mg tablet; 1,000 mg        No problem-specific Assessment & Plan notes found for this encounter  Subjective:      Patient ID: Es Malik is a 31 y o  female  Patient here today for follow-up from an ER visit  Patient picked up her sister wicho over the weekend bent backwards and felt her back go out    Went to the ER the next day because it was still hurting her  She was given Robaxin in the ER which did help  She has no radiation of pain down her legs or arms  No bowel or bladder problems  Patient also has been off her Lamictal   Would like to restart it  No SI or HI  Also needs a refill on EpiPen for her bee sting allergy and also albuterol inhaler for asthma    Back Pain  This is a new problem  The current episode started in the past 7 days  The problem occurs daily  The problem has been gradually improving since onset  The pain is present in the lumbar spine  The quality of the pain is described as aching  The pain does not radiate  The pain is mild  The symptoms are aggravated by bending, twisting and position  Stiffness is present all day  Pertinent negatives include no bladder incontinence, bowel incontinence or weakness  She has tried muscle relaxant and NSAIDs for the symptoms  The treatment provided moderate relief  Asthma  She complains of wheezing  This is a chronic problem  The problem occurs intermittently  The problem has been unchanged  Her symptoms are aggravated by pollen  Her symptoms are alleviated by beta-agonist  She reports significant improvement on treatment  Risk factors for lung disease include smoking/tobacco exposure  Her past medical history is significant for asthma  The following portions of the patient's history were reviewed and updated as appropriate:   She has a past medical history of Asthma, Bipolar 1 disorder ([address] ), Closed right radial fracture, High-risk pregnancy, and PTSD (post-traumatic stress disorder)  ,  does not have any pertinent problems on file  ,   has a past surgical history that includes Mouth surgery; Colonoscopy; and Stanton tooth extraction  ,  family history includes Cancer in her paternal grandmother; Diabetes in her maternal grandmother, maternal uncle, and mother; Heart attack in her mother; Heart disease in her maternal uncle and mother; Hypertension in her maternal grandmother and mother; Mental illness in her father  ,   reports that she has been smoking  She has never used smokeless tobacco  She reports current alcohol use  She reports that she does not use drugs  ,  is allergic to bee venom and bupropion     Current Outpatient Medications   Medication Sig Dispense Refill    albuterol (PROVENTIL HFA,VENTOLIN HFA) 90 mcg/act inhaler Inhale 2 puffs every 6 (six) hours as needed for wheezing or shortness of breath 18 g 1    lamoTRIgine (LaMICtal) 100 mg tablet 1 5 tabs po q daily 45 tablet 3    EPINEPHrine (EPIPEN) 0 3 mg/0 3 mL SOAJ Inject 0 3 mL (0 3 mg total) into a muscle once for 1 dose 0 6 mL 1    ibuprofen (MOTRIN) 600 mg tablet 600 mg      methocarbamol (ROBAXIN) 500 mg tablet 1,000 mg       No current facility-administered medications for this visit  Review of Systems   Constitutional: Negative  Respiratory: Positive for wheezing  Cardiovascular: Negative  Gastrointestinal: Negative  Negative for bowel incontinence  Genitourinary: Negative  Negative for bladder incontinence  Musculoskeletal: Positive for back pain  Neurological: Negative for weakness  Objective:  Vitals:    08/17/21 0951   BP: 106/68   Temp: (!) 97 2 °F (36 2 °C)   Weight: 86 5 kg (190 lb 12 8 oz)   Height: 5' 2" (1 575 m)     Body mass index is 34 9 kg/m²  Physical Exam  Vitals reviewed  Constitutional:       General: She is not in acute distress  Appearance: Normal appearance  She is well-developed  She is not diaphoretic  HENT:      Head: Normocephalic and atraumatic  Eyes:      Conjunctiva/sclera: Conjunctivae normal    Cardiovascular:      Rate and Rhythm: Normal rate and regular rhythm  Heart sounds: Normal heart sounds  No murmur heard  No friction rub  No gallop  Pulmonary:      Effort: Pulmonary effort is normal  No respiratory distress  Breath sounds: Normal breath sounds  No wheezing, rhonchi or rales     Musculoskeletal: General: Tenderness (Mild low lumbar paraspinal tenderness) present  Right lower leg: No edema  Left lower leg: No edema  Neurological:      Mental Status: She is alert and oriented to person, place, and time  Psychiatric:         Mood and Affect: Mood normal          Behavior: Behavior normal          Thought Content:  Thought content normal          Judgment: Judgment normal

## 2021-08-17 NOTE — LETTER
August 17, 2021     Patient: Raina Kinney   YOB: 1990   Date of Visit: 8/17/2021       To Whom it May Concern:    Raina Kinney is under my professional care  She was seen in my office on 8/17/2021  She may return to work on 8/23/2021 with no restrictions  If you have any questions or concerns, please don't hesitate to call           Sincerely,          Darwin Arredondo DO        CC: No Recipients

## 2021-11-11 LAB
EXTERNAL HIV SCREEN: NORMAL
HCV AB SER-ACNC: NEGATIVE

## 2021-12-16 ENCOUNTER — HOSPITAL ENCOUNTER (EMERGENCY)
Facility: HOSPITAL | Age: 31
Discharge: HOME | End: 2021-12-16
Attending: EMERGENCY MEDICINE
Payer: COMMERCIAL

## 2021-12-16 VITALS
RESPIRATION RATE: 18 BRPM | SYSTOLIC BLOOD PRESSURE: 133 MMHG | TEMPERATURE: 96.8 F | HEART RATE: 98 BPM | OXYGEN SATURATION: 99 % | WEIGHT: 195 LBS | DIASTOLIC BLOOD PRESSURE: 61 MMHG | BODY MASS INDEX: 35.88 KG/M2 | HEIGHT: 62 IN

## 2021-12-16 DIAGNOSIS — Z34.90 CURRENTLY PREGNANT: ICD-10-CM

## 2021-12-16 DIAGNOSIS — R22.31 LUMP OF AXILLA, RIGHT: Primary | ICD-10-CM

## 2021-12-16 PROCEDURE — 99284 EMERGENCY DEPT VISIT MOD MDM: CPT | Performed by: PHYSICIAN ASSISTANT

## 2021-12-16 PROCEDURE — 99283 EMERGENCY DEPT VISIT LOW MDM: CPT

## 2021-12-16 RX ORDER — CEPHALEXIN 500 MG/1
500 CAPSULE ORAL EVERY 8 HOURS SCHEDULED
Qty: 21 CAPSULE | Refills: 0 | Status: SHIPPED | OUTPATIENT
Start: 2021-12-16 | End: 2021-12-23

## 2021-12-16 RX ORDER — CEPHALEXIN 250 MG/1
500 CAPSULE ORAL ONCE
Status: COMPLETED | OUTPATIENT
Start: 2021-12-16 | End: 2021-12-16

## 2021-12-16 RX ADMIN — CEPHALEXIN 500 MG: 250 CAPSULE ORAL at 13:02

## 2021-12-21 ENCOUNTER — CONSULT (OUTPATIENT)
Dept: SURGERY | Facility: CLINIC | Age: 31
End: 2021-12-21
Payer: COMMERCIAL

## 2021-12-21 VITALS
WEIGHT: 201 LBS | HEIGHT: 62 IN | SYSTOLIC BLOOD PRESSURE: 126 MMHG | BODY MASS INDEX: 36.99 KG/M2 | TEMPERATURE: 98.2 F | HEART RATE: 86 BPM | DIASTOLIC BLOOD PRESSURE: 78 MMHG

## 2021-12-21 DIAGNOSIS — L73.2 HIDRADENITIS SUPPURATIVA: Primary | ICD-10-CM

## 2021-12-21 PROCEDURE — 99204 OFFICE O/P NEW MOD 45 MIN: CPT | Performed by: SURGERY

## 2022-01-24 ENCOUNTER — HOSPITAL ENCOUNTER (EMERGENCY)
Facility: HOSPITAL | Age: 32
Discharge: HOME | End: 2022-01-24
Attending: EMERGENCY MEDICINE
Payer: MEDICARE

## 2022-01-24 VITALS
DIASTOLIC BLOOD PRESSURE: 71 MMHG | TEMPERATURE: 98.1 F | BODY MASS INDEX: 36.58 KG/M2 | HEART RATE: 83 BPM | OXYGEN SATURATION: 99 % | SYSTOLIC BLOOD PRESSURE: 132 MMHG | RESPIRATION RATE: 20 BRPM | WEIGHT: 200 LBS

## 2022-01-24 DIAGNOSIS — J06.9 VIRAL URI WITH COUGH: Primary | ICD-10-CM

## 2022-01-24 LAB
BILIRUB UR QL STRIP: NEGATIVE
CLARITY UR: CLEAR
COLOR UR: YELLOW
GLUCOSE UR STRIP-MCNC: NEGATIVE MG/DL
HGB UR QL STRIP.AUTO: NEGATIVE
KETONES UR STRIP-MCNC: ABNORMAL MG/DL
LEUKOCYTE ESTERASE UR QL STRIP: NEGATIVE
NITRITE UR QL STRIP: NEGATIVE
PH UR STRIP.AUTO: 6 [PH]
PROT UR STRIP-MCNC: NEGATIVE MG/DL
SP GR UR STRIP.AUTO: >=1.03 (ref 1–1.03)
UROBILINOGEN UR QL STRIP.AUTO: 0.2 E.U./DL

## 2022-01-24 PROCEDURE — 99283 EMERGENCY DEPT VISIT LOW MDM: CPT

## 2022-01-24 PROCEDURE — 87636 SARSCOV2 & INF A&B AMP PRB: CPT | Performed by: PHYSICIAN ASSISTANT

## 2022-01-24 PROCEDURE — 99284 EMERGENCY DEPT VISIT MOD MDM: CPT | Performed by: PHYSICIAN ASSISTANT

## 2022-01-24 PROCEDURE — 81003 URINALYSIS AUTO W/O SCOPE: CPT | Performed by: PHYSICIAN ASSISTANT

## 2022-01-25 LAB
FLUAV RNA RESP QL NAA+PROBE: NEGATIVE
FLUBV RNA RESP QL NAA+PROBE: NEGATIVE
SARS-COV-2 RNA RESP QL NAA+PROBE: POSITIVE

## 2022-01-25 NOTE — ED PROVIDER NOTES
History  Chief Complaint   Patient presents with    Cold Like Symptoms     patient reports "cold symptoms" including cough, runny nose  states her kids at home have covid  she is also 18 weeks pregnant  Patient presents to the emergency department today for evaluation cold symptoms  This is a very pleasant 31-year-old female who is  6 para 4  Approximately 18 weeks pregnant she is followed by Obstetrics  She states 1 week ago she began with nasal congestion sore throat and cough  She states 1 of her offspring has been diagnosed with COVID  She states she has had no vaginal bleeding or abdominal pain  She has noted fetal movement she just wants to make sure her unborn child is okay  She is well-appearing at bedside without hypoxia or tachycardia or hypertension  Prior to Admission Medications   Prescriptions Last Dose Informant Patient Reported? Taking?    EPINEPHrine (EPIPEN) 0 3 mg/0 3 mL SOAJ   No No   Sig: Inject 0 3 mL (0 3 mg total) into a muscle once for 1 dose   albuterol (PROVENTIL HFA,VENTOLIN HFA) 90 mcg/act inhaler  Self No No   Sig: Inhale 2 puffs every 6 (six) hours as needed for wheezing or shortness of breath   ibuprofen (MOTRIN) 600 mg tablet   Yes No   Si mg   lamoTRIgine (LaMICtal) 100 mg tablet  Self No No   Si 5 tabs po q daily   methocarbamol (ROBAXIN) 500 mg tablet   Yes No   Si,000 mg      Facility-Administered Medications: None       Past Medical History:   Diagnosis Date    Asthma     Bipolar 1 disorder (HCC)     Closed right radial fracture     High-risk pregnancy     PTSD (post-traumatic stress disorder)        Past Surgical History:   Procedure Laterality Date    COLONOSCOPY      MOUTH SURGERY      WISDOM TOOTH EXTRACTION         Family History   Problem Relation Age of Onset    Diabetes Mother     Heart attack Mother     Heart disease Mother     Hypertension Mother     Mental illness Father     Diabetes Maternal Grandmother     Hypertension Maternal Grandmother     Cancer Paternal Grandmother     Diabetes Maternal Uncle     Heart disease Maternal Uncle      I have reviewed and agree with the history as documented  E-Cigarette/Vaping     E-Cigarette/Vaping Substances     Social History     Tobacco Use    Smoking status: Former Smoker    Smokeless tobacco: Never Used   Substance Use Topics    Alcohol use: Not Currently     Comment: social    Drug use: Never       Review of Systems   Constitutional: Negative for chills and fever  HENT: Positive for congestion and sore throat  Negative for ear pain  Eyes: Negative for pain and visual disturbance  Respiratory: Positive for cough  Negative for shortness of breath  Cardiovascular: Negative for chest pain and palpitations  Gastrointestinal: Negative for abdominal pain and vomiting  Genitourinary: Negative for dysuria and hematuria  Musculoskeletal: Negative for arthralgias and back pain  Skin: Negative for color change and rash  Neurological: Negative for seizures and syncope  All other systems reviewed and are negative  Physical Exam  Physical Exam  Vitals and nursing note reviewed  Constitutional:       General: She is not in acute distress  Appearance: Normal appearance  She is well-developed  She is not ill-appearing, toxic-appearing or diaphoretic  HENT:      Head: Normocephalic and atraumatic  Nose: Congestion present  Mouth/Throat:      Pharynx: No oropharyngeal exudate or posterior oropharyngeal erythema  Eyes:      General: No scleral icterus  Right eye: No discharge  Left eye: No discharge  Extraocular Movements: Extraocular movements intact  Conjunctiva/sclera: Conjunctivae normal       Pupils: Pupils are equal, round, and reactive to light  Cardiovascular:      Rate and Rhythm: Normal rate and regular rhythm  Heart sounds: No murmur heard  No friction rub  No gallop      Pulmonary:      Effort: Pulmonary effort is normal  No respiratory distress  Breath sounds: Normal breath sounds  No stridor  No wheezing, rhonchi or rales  Chest:      Chest wall: No tenderness  Abdominal:      Palpations: Abdomen is soft  Tenderness: There is no abdominal tenderness  Comments: Gravid abdomen that is nontender  Personally assessed fetal heart tones 141 beats per minute at bedside   Musculoskeletal:         General: No deformity or signs of injury  Cervical back: Neck supple  Right lower leg: No edema  Left lower leg: No edema  Skin:     General: Skin is warm and dry  Neurological:      General: No focal deficit present  Mental Status: She is alert and oriented to person, place, and time  Psychiatric:         Mood and Affect: Mood normal          Behavior: Behavior normal          Thought Content:  Thought content normal          Judgment: Judgment normal          Vital Signs  ED Triage Vitals [01/24/22 1904]   Temperature Pulse Respirations Blood Pressure SpO2   98 1 °F (36 7 °C) 83 20 132/71 99 %      Temp Source Heart Rate Source Patient Position - Orthostatic VS BP Location FiO2 (%)   Tympanic Monitor Sitting Right arm --      Pain Score       No Pain           Vitals:    01/24/22 1904   BP: 132/71   Pulse: 83   Patient Position - Orthostatic VS: Sitting         Visual Acuity      ED Medications  Medications - No data to display    Diagnostic Studies  Results Reviewed     Procedure Component Value Units Date/Time    UA (URINE) with reflex to Scope [[id number]]  (Abnormal) Collected: 01/24/22 1929    Lab Status: Final result Specimen: Urine, Clean Catch Updated: 01/24/22 1939     Color, UA Yellow     Clarity, UA Clear     Specific Gravity, UA >=1 030     pH, UA 6 0     Leukocytes, UA Negative     Nitrite, UA Negative     Protein, UA Negative mg/dl      Glucose, UA Negative mg/dl      Ketones, UA 15 (1+) mg/dl      Urobilinogen, UA 0 2 E U /dl      Bilirubin, UA Negative Blood, UA Negative    COVID/FLU - 24 hour TAT [[id number]] Collected: 01/24/22 1931    Lab Status: In process Specimen: Nares from Nose Updated: 01/24/22 1936                 No orders to display              Procedures  Procedures         ED Course  ED Course as of 01/24/22 1940 Mon Jan 24, 2022 1934 SpO2: 99 %   1934 Respirations: 20   1934 Pulse: 83   1934 Temperature: 98 1 °F (36 7 °C)   1934 Blood Pressure: 132/71   1939 Blood, UA: Negative   1939 Color, UA: Yellow                                             MDM    Disposition  Final diagnoses:   Viral URI with cough     Time reflects when diagnosis was documented in both MDM as applicable and the Disposition within this note     Time User Action Codes Description Comment    1/24/2022  7:40 PM Briseyda Del CONSUELO Add [J06 9] Viral URI with cough       ED Disposition     ED Disposition Condition Date/Time Comment    Discharge Stable Mon Jan 24, 2022  7:40  [address] discharge to home/self care  Follow-up Information     Follow up With Specialties Details Why [address], DO Family Medicine Schedule an appointment as soon as possible for a visit  As needed [address] ([place] [address]) [place] [id number] [phone]            Patient's Medications   Discharge Prescriptions    No medications on file       No discharge procedures on file      PDMP Review     None          ED Provider  Electronically Signed by           Mariajose Ortiz PA-C  01/24/22 1941

## 2022-01-26 ENCOUNTER — TELEPHONE (OUTPATIENT)
Dept: FAMILY MEDICINE CLINIC | Facility: CLINIC | Age: 32
End: 2022-01-26

## 2022-01-26 NOTE — TELEPHONE ENCOUNTER
Sent in basket message to PCP & team to schedule virtual visit with pregnant pt  She will meet MAB criteria in tier 2

## 2022-01-26 NOTE — TELEPHONE ENCOUNTER
----- Message from Miguel Hernandez PA-C sent at 1/26/2022  9:51 AM EST -----  +COVID, pregnant  Informed of +covid result   go to ED for worsening SOB, f/u with PCP

## 2022-09-19 ENCOUNTER — OFFICE VISIT (OUTPATIENT)
Dept: SURGERY | Facility: CLINIC | Age: 32
End: 2022-09-19
Payer: MEDICARE

## 2022-09-19 VITALS
SYSTOLIC BLOOD PRESSURE: 131 MMHG | BODY MASS INDEX: 38.09 KG/M2 | OXYGEN SATURATION: 98 % | DIASTOLIC BLOOD PRESSURE: 74 MMHG | WEIGHT: 207 LBS | HEART RATE: 93 BPM | TEMPERATURE: 98.4 F | HEIGHT: 62 IN

## 2022-09-19 DIAGNOSIS — L73.2 HIDRADENITIS SUPPURATIVA: Primary | ICD-10-CM

## 2022-09-19 PROCEDURE — 99214 OFFICE O/P EST MOD 30 MIN: CPT | Performed by: SURGERY

## 2022-09-19 RX ORDER — DOXYCYCLINE 100 MG/1
100 TABLET ORAL 2 TIMES DAILY
Qty: 28 TABLET | Refills: 2 | Status: SHIPPED | OUTPATIENT
Start: 2022-09-19 | End: 2022-10-03

## 2022-09-19 NOTE — ASSESSMENT & PLAN NOTE
Recurrent hidradenitis of bilateral axilla  No active infection noted  One cystic lesion with recent drainage of the right axilla  There is some mild tenderness to the left axilla but no palpable fluctuant areas to drain  Will start doxycycline for the next 2 weeks  Will place referral for dermatology    Follow-up 3 weeks

## 2022-09-19 NOTE — PROGRESS NOTES
Assessment/Plan:    Hidradenitis suppurativa  Recurrent hidradenitis of bilateral axilla  No active infection noted  One cystic lesion with recent drainage of the right axilla  There is some mild tenderness to the left axilla but no palpable fluctuant areas to drain  Will start doxycycline for the next 2 weeks  Will place referral for dermatology  Follow-up 3 weeks       Diagnoses and all orders for this visit:    Hidradenitis suppurativa  -     Ambulatory Referral to Dermatology; Future  -     doxycycline (ADOXA) 100 MG tablet; Take 1 tablet (100 mg total) by mouth 2 (two) times a day for 14 days          Subjective:      Patient ID: Travis Scott is a 36 y o  female  82-year-old female with known asthma, hidradenitis , presents today for follow-up due to recurrent cystic lesions of the bilateral axilla  Patient states that she has significant tenderness bilateral axilla  She states that she had a cystic lesion of the right axilla which after warm compresses subsequently opened up and drained  There is some mild tenderness there  Patient states she has some significant tenderness the left axilla with 2 new lesions  Denies any redness  No active drainage to the left axilla  She did not denies any fluid-filled areas of the left axilla  No nausea vomiting fevers or chills  Patient was last given Keflex by the ER her previous flare back in December  She states that time that did not work  She has never seen Dermatology due regarding this diagnosis  No other associated symptoms  The following portions of the patient's history were reviewed and updated as appropriate:   She  has a past medical history of Asthma, Bipolar 1 disorder ([address] ), Closed right radial fracture, High-risk pregnancy, and PTSD (post-traumatic stress disorder)    She   Patient Active Problem List    Diagnosis Date Noted    Hidradenitis suppurativa 12/21/2021    Asthma 08/17/2021    Enlarged liver 10/27/2020    Abscess 12/18/2019    Bipolar disorder, current episode mixed, mild ([address] ) 11/11/2019    Bee sting allergy 11/11/2019     She  has a past surgical history that includes Mouth surgery; Colonoscopy; and [place] tooth extraction  Her family history includes Cancer in her paternal grandmother; Diabetes in her maternal grandmother, maternal uncle, and mother; Heart attack in her mother; Heart disease in her maternal uncle and mother; Hypertension in her maternal grandmother and mother; Mental illness in her father  She  reports that she has quit smoking  She has never used smokeless tobacco  She reports previous alcohol use  She reports that she does not use drugs  Current Outpatient Medications   Medication Sig Dispense Refill    albuterol (PROVENTIL HFA,VENTOLIN HFA) 90 mcg/act inhaler Inhale 2 puffs every 6 (six) hours as needed for wheezing or shortness of breath 18 g 1    doxycycline (ADOXA) 100 MG tablet Take 1 tablet (100 mg total) by mouth 2 (two) times a day for 14 days 28 tablet 2    EPINEPHrine (EPIPEN) 0 3 mg/0 3 mL SOAJ Inject 0 3 mL (0 3 mg total) into a muscle once for 1 dose 0 6 mL 1    ibuprofen (MOTRIN) 600 mg tablet 600 mg      lamoTRIgine (LaMICtal) 100 mg tablet 1 5 tabs po q daily (Patient not taking: Reported on 9/19/2022) 45 tablet 3    methocarbamol (ROBAXIN) 500 mg tablet 1,000 mg (Patient not taking: Reported on 9/19/2022)       No current facility-administered medications for this visit  She is allergic to bee venom and bupropion       Review of Systems      Review systems completed, all negative except as noted above HPI  Objective:      /74   Pulse 93   Temp 98 4 °F (36 9 °C)   Ht 5' 2" (1 575 m)   Wt 93 9 kg (207 lb)   SpO2 98%   Breastfeeding Unknown   BMI 37 86 kg/m²          Physical Exam  Vitals reviewed  Constitutional:       General: She is not in acute distress  Appearance: Normal appearance  She is not ill-appearing, toxic-appearing or diaphoretic  HENT:      Head: Normocephalic and atraumatic  Right Ear: External ear normal       Left Ear: External ear normal    Eyes:      General: No scleral icterus  Right eye: No discharge  Left eye: No discharge  Cardiovascular:      Rate and Rhythm: Normal rate and regular rhythm  Heart sounds: No friction rub  Pulmonary:      Effort: Pulmonary effort is normal  No respiratory distress  Breath sounds: No stridor  No wheezing or rhonchi  Abdominal:      General: There is no distension  Tenderness: There is no abdominal tenderness  Musculoskeletal:         General: No swelling, tenderness, deformity or signs of injury  Normal range of motion  Cervical back: Normal range of motion  Skin:     General: Skin is warm  Coloration: Skin is not jaundiced or pale  Findings: No bruising or erythema  Comments: Right axillary cystic lesion without refer evidence of recent drainage  No erythema  Mild tenderness  Left axilla with 2 palpable areas of tenderness  No erythema  No fluctuance noted  Neurological:      General: No focal deficit present  Mental Status: She is alert and oriented to person, place, and time  Cranial Nerves: No cranial nerve deficit  Psychiatric:         Mood and Affect: Mood normal          Behavior: Behavior normal          Thought Content:  Thought content normal          Judgment: Judgment normal

## 2022-09-28 ENCOUNTER — TELEPHONE (OUTPATIENT)
Dept: SURGERY | Facility: CLINIC | Age: 32
End: 2022-09-28

## 2022-09-28 NOTE — TELEPHONE ENCOUNTER
Pt aunt called and lm, pt both armpits very red and painful, unable to take antibotics until Friday, sees Derm Thursdays asking what to do, pt can hardly put arms down   Please advise

## 2022-11-30 ENCOUNTER — OFFICE VISIT (OUTPATIENT)
Dept: FAMILY MEDICINE CLINIC | Facility: CLINIC | Age: 32
End: 2022-11-30

## 2022-11-30 VITALS
WEIGHT: 217.4 LBS | OXYGEN SATURATION: 97 % | TEMPERATURE: 97.6 F | DIASTOLIC BLOOD PRESSURE: 64 MMHG | HEART RATE: 98 BPM | HEIGHT: 62 IN | BODY MASS INDEX: 40.01 KG/M2 | SYSTOLIC BLOOD PRESSURE: 112 MMHG

## 2022-11-30 DIAGNOSIS — F31.61 BIPOLAR DISORDER, CURRENT EPISODE MIXED, MILD (HCC): ICD-10-CM

## 2022-11-30 DIAGNOSIS — Z00.00 MEDICARE ANNUAL WELLNESS VISIT, SUBSEQUENT: Primary | ICD-10-CM

## 2022-11-30 DIAGNOSIS — T63.441A BEE STING REACTION: ICD-10-CM

## 2022-11-30 RX ORDER — EPINEPHRINE 0.3 MG/.3ML
0.3 INJECTION SUBCUTANEOUS ONCE
Qty: 0.6 ML | Refills: 1 | Status: SHIPPED | OUTPATIENT
Start: 2022-11-30 | End: 2022-11-30

## 2022-11-30 NOTE — PATIENT INSTRUCTIONS
Medicare Preventive Visit Patient Instructions  Thank you for completing your Welcome to Medicare Visit or Medicare Annual Wellness Visit today  Your next wellness visit will be due in one year (12/1/2023)  The screening/preventive services that you may require over the next 5-10 years are detailed below  Some tests may not apply to you based off risk factors and/or age  Screening tests ordered at today's visit but not completed yet may show as past due  Also, please note that scanned in results may not display below  Preventive Screenings:  Service Recommendations Previous Testing/Comments   Colorectal Cancer Screening  * Colonoscopy    * Fecal Occult Blood Test (FOBT)/Fecal Immunochemical Test (FIT)  * Fecal DNA/Cologuard Test  * Flexible Sigmoidoscopy Age: 39-70 years old   Colonoscopy: every 10 years (may be performed more frequently if at higher risk)  OR  FOBT/FIT: every 1 year  OR  Cologuard: every 3 years  OR  Sigmoidoscopy: every 5 years  Screening may be recommended earlier than age 39 if at higher risk for colorectal cancer  Also, an individualized decision between you and your healthcare provider will decide whether screening between the ages of 74-80 would be appropriate  Colonoscopy: Not on file  FOBT/FIT: Not on file  Cologuard: Not on file  Sigmoidoscopy: Not on file          Breast Cancer Screening Age: 36 years old  Frequency: every 1-2 years  Not required if history of left and right mastectomy Mammogram: Not on file    Screening Not Indicated   Cervical Cancer Screening Between the ages of 21-29, pap smear recommended once every 3 years  Between the ages of 33-67, can perform pap smear with HPV co-testing every 5 years     Recommendations may differ for women with a history of total hysterectomy, cervical cancer, or abnormal pap smears in past  Pap Smear: Not on file        Hepatitis C Screening Once for adults born between 1945 and 1965  More frequently in patients at high risk for Hepatitis C Hep C Antibody: Not on file        Diabetes Screening 1-2 times per year if you're at risk for diabetes or have pre-diabetes Fasting glucose: 86 mg/dL (11/11/2019)  A1C: 5 1 % (11/11/2019)      Cholesterol Screening Once every 5 years if you don't have a lipid disorder  May order more often based on risk factors  Lipid panel: 11/11/2019    Screening Current     Other Preventive Screenings Covered by Medicare:  1  Abdominal Aortic Aneurysm (AAA) Screening: covered once if your at risk  You're considered to be at risk if you have a family history of AAA  2  Lung Cancer Screening: covers low dose CT scan once per year if you meet all of the following conditions: (1) Age 50-69; (2) No signs or symptoms of lung cancer; (3) Current smoker or have quit smoking within the last 15 years; (4) You have a tobacco smoking history of at least 20 pack years (packs per day multiplied by number of years you smoked); (5) You get a written order from a healthcare provider  3  Glaucoma Screening: covered annually if you're considered high risk: (1) You have diabetes OR (2) Family history of glaucoma OR (3)  aged 48 and older OR (3)  American aged 72 and older  3  Osteoporosis Screening: covered every 2 years if you meet one of the following conditions: (1) You're estrogen deficient and at risk for osteoporosis based off medical history and other findings; (2) Have a vertebral abnormality; (3) On glucocorticoid therapy for more than 3 months; (4) Have primary hyperparathyroidism; (5) On osteoporosis medications and need to assess response to drug therapy  · Last bone density test (DXA Scan): Not on file  5  HIV Screening: covered annually if you're between the age of 12-76  Also covered annually if you are younger than 13 and older than 72 with risk factors for HIV infection  For pregnant patients, it is covered up to 3 times per pregnancy      Immunizations:  Immunization Recommendations Influenza Vaccine Annual influenza vaccination during flu season is recommended for all persons aged >= 6 months who do not have contraindications   Pneumococcal Vaccine   * Pneumococcal conjugate vaccine = PCV13 (Prevnar 13), PCV15 (Vaxneuvance), PCV20 (Prevnar 20)  * Pneumococcal polysaccharide vaccine = PPSV23 (Pneumovax) Adults 25-60 years old: 1-3 doses may be recommended based on certain risk factors  Adults 72 years old: 1-2 doses may be recommended based off what pneumonia vaccine you previously received   Hepatitis B Vaccine 3 dose series if at intermediate or high risk (ex: diabetes, end stage renal disease, liver disease)   Tetanus (Td) Vaccine - COST NOT COVERED BY MEDICARE PART B Following completion of primary series, a booster dose should be given every 10 years to maintain immunity against tetanus  Td may also be given as tetanus wound prophylaxis  Tdap Vaccine - COST NOT COVERED BY MEDICARE PART B Recommended at least once for all adults  For pregnant patients, recommended with each pregnancy  Shingles Vaccine (Shingrix) - COST NOT COVERED BY MEDICARE PART B  2 shot series recommended in those aged 48 and above     Health Maintenance Due:      Topic Date Due   • Hepatitis C Screening  Never done   • HIV Screening  Never done   • Cervical Cancer Screening  Never done     Immunizations Due:      Topic Date Due   • COVID-19 Vaccine (1) Never done   • Hepatitis A Vaccine (2 of 2 - 2-dose series) 10/15/2010   • Pneumococcal Vaccine: Pediatrics (0 to 5 Years) and At-Risk Patients (6 to 59 Years) (2 - PCV) 02/28/2017     Advance Directives   What are advance directives? Advance directives are legal documents that state your wishes and plans for medical care  These plans are made ahead of time in case you lose your ability to make decisions for yourself  Advance directives can apply to any medical decision, such as the treatments you want, and if you want to donate organs     What are the types of advance directives? There are many types of advance directives, and each state has rules about how to use them  You may choose a combination of any of the following:  · Living will: This is a written record of the treatment you want  You can also choose which treatments you do not want, which to limit, and which to stop at a certain time  This includes surgery, medicine, IV fluid, and tube feedings  · Durable power of  for healthcare [facility] [place]): This is a written record that states who you want to make healthcare choices for you when you are unable to make them for yourself  This person, called a proxy, is usually a family member or a friend  You may choose more than 1 proxy  · Do not resuscitate (DNR) order:  A DNR order is used in case your heart stops beating or you stop breathing  It is a request not to have certain forms of treatment, such as CPR  A DNR order may be included in other types of advance directives  · Medical directive: This covers the care that you want if you are in a coma, near death, or unable to make decisions for yourself  You can list the treatments you want for each condition  Treatment may include pain medicine, surgery, blood transfusions, dialysis, IV or tube feedings, and a ventilator (breathing machine)  · Values history: This document has questions about your views, beliefs, and how you feel and think about life  This information can help others choose the care that you would choose  Why are advance directives important? An advance directive helps you control your care  Although spoken wishes may be used, it is better to have your wishes written down  Spoken wishes can be misunderstood, or not followed  Treatments may be given even if you do not want them  An advance directive may make it easier for your family to make difficult choices about your care  Urinary Incontinence   Urinary incontinence (UI)  is when you lose control of your bladder   UI develops because your bladder cannot store or empty urine properly  The 3 most common types of UI are stress incontinence, urge incontinence, or both  Medicines:   · May be given to help strengthen your bladder control  Report any side effects of medication to your healthcare provider  Do pelvic muscle exercises often:  Your pelvic muscles help you stop urinating  Squeeze these muscles tight for 5 seconds, then relax for 5 seconds  Gradually work up to squeezing for 10 seconds  Do 3 sets of 15 repetitions a day, or as directed  This will help strengthen your pelvic muscles and improve bladder control  Train your bladder:  Go to the bathroom at set times, such as every 2 hours, even if you do not feel the urge to go  You can also try to hold your urine when you feel the urge to go  For example, hold your urine for 5 minutes when you feel the urge to go  As that becomes easier, hold your urine for 10 minutes  Self-care:   · Keep a UI record  Write down how often you leak urine and how much you leak  Make a note of what you were doing when you leaked urine  · Drink liquids as directed  You may need to limit the amount of liquid you drink to help control your urine leakage  Do not drink any liquid right before you go to bed  Limit or do not have drinks that contain caffeine or alcohol  · Prevent constipation  Eat a variety of high-fiber foods  Good examples are high-fiber cereals, beans, vegetables, and whole-grain breads  Walking is the best way to trigger your intestines to have a bowel movement  · Exercise regularly and maintain a healthy weight  Weight loss and exercise will decrease pressure on your bladder and help you control your leakage  · Use a catheter as directed  to help empty your bladder  A catheter is a tiny, plastic tube that is put into your bladder to drain your urine  · Go to behavior therapy as directed  Behavior therapy may be used to help you learn to control your urge to urinate      Weight Management   Why it is important to manage your weight:  Being overweight increases your risk of health conditions such as heart disease, high blood pressure, type 2 diabetes, and certain types of cancer  It can also increase your risk for osteoarthritis, sleep apnea, and other respiratory problems  Aim for a slow, steady weight loss  Even a small amount of weight loss can lower your risk of health problems  How to lose weight safely:  A safe and healthy way to lose weight is to eat fewer calories and get regular exercise  You can lose up about 1 pound a week by decreasing the number of calories you eat by 500 calories each day  Healthy meal plan for weight management:  A healthy meal plan includes a variety of foods, contains fewer calories, and helps you stay healthy  A healthy meal plan includes the following:  · Eat whole-grain foods more often  A healthy meal plan should contain fiber  Fiber is the part of grains, fruits, and vegetables that is not broken down by your body  Whole-grain foods are healthy and provide extra fiber in your diet  Some examples of whole-grain foods are whole-wheat breads and pastas, oatmeal, brown rice, and bulgur  · Eat a variety of vegetables every day  Include dark, leafy greens such as spinach, kale, sean greens, and mustard greens  Eat yellow and orange vegetables such as carrots, sweet potatoes, and winter squash  · Eat a variety of fruits every day  Choose fresh or canned fruit (canned in its own juice or light syrup) instead of juice  Fruit juice has very little or no fiber  · Eat low-fat dairy foods  Drink fat-free (skim) milk or 1% milk  Eat fat-free yogurt and low-fat cottage cheese  Try low-fat cheeses such as mozzarella and other reduced-fat cheeses  · Choose meat and other protein foods that are low in fat  Choose beans or other legumes such as split peas or lentils   Choose fish, skinless poultry (chicken or turkey), or lean cuts of red meat (beef or pork)  Before you cook meat or poultry, cut off any visible fat  · Use less fat and oil  Try baking foods instead of frying them  Add less fat, such as margarine, sour cream, regular salad dressing and mayonnaise to foods  Eat fewer high-fat foods  Some examples of high-fat foods include french fries, doughnuts, ice cream, and cakes  · Eat fewer sweets  Limit foods and drinks that are high in sugar  This includes candy, cookies, regular soda, and sweetened drinks  Exercise:  Exercise at least 30 minutes per day on most days of the week  Some examples of exercise include walking, biking, dancing, and swimming  You can also fit in more physical activity by taking the stairs instead of the elevator or parking farther away from stores  Ask your healthcare provider about the best exercise plan for you  © Copyright [employer] 2018 Information is for End User's use only and may not be sold, redistributed or otherwise used for commercial purposes  All illustrations and images included in CareNotes® are the copyrighted property of [employer]  or [facility] Preventive Visit Patient Instructions  Thank you for completing your Welcome to Medicare Visit or Medicare Annual Wellness Visit today  Your next wellness visit will be due in one year (12/1/2023)  The screening/preventive services that you may require over the next 5-10 years are detailed below  Some tests may not apply to you based off risk factors and/or age  Screening tests ordered at today's visit but not completed yet may show as past due  Also, please note that scanned in results may not display below    Preventive Screenings:  Service Recommendations Previous Testing/Comments   Colorectal Cancer Screening  * Colonoscopy    * Fecal Occult Blood Test (FOBT)/Fecal Immunochemical Test (FIT)  * Fecal DNA/Cologuard Test  * Flexible Sigmoidoscopy Age: 39-70 years old   Colonoscopy: every 10 years (may be performed more frequently if at higher risk)  OR  FOBT/FIT: every 1 year  OR  Cologuard: every 3 years  OR  Sigmoidoscopy: every 5 years  Screening may be recommended earlier than age 39 if at higher risk for colorectal cancer  Also, an individualized decision between you and your healthcare provider will decide whether screening between the ages of 74-80 would be appropriate  Colonoscopy: Not on file  FOBT/FIT: Not on file  Cologuard: Not on file  Sigmoidoscopy: Not on file          Breast Cancer Screening Age: 36 years old  Frequency: every 1-2 years  Not required if history of left and right mastectomy Mammogram: Not on file    Screening Not Indicated   Cervical Cancer Screening Between the ages of 21-29, pap smear recommended once every 3 years  Between the ages of 33-67, can perform pap smear with HPV co-testing every 5 years  Recommendations may differ for women with a history of total hysterectomy, cervical cancer, or abnormal pap smears in past  Pap Smear: Not on file        Hepatitis C Screening Once for adults born between 1945 and 1965  More frequently in patients at high risk for Hepatitis C Hep C Antibody: Not on file        Diabetes Screening 1-2 times per year if you're at risk for diabetes or have pre-diabetes Fasting glucose: 86 mg/dL (11/11/2019)  A1C: 5 1 % (11/11/2019)      Cholesterol Screening Once every 5 years if you don't have a lipid disorder  May order more often based on risk factors  Lipid panel: 11/11/2019    Screening Current     Other Preventive Screenings Covered by Medicare:  6  Abdominal Aortic Aneurysm (AAA) Screening: covered once if your at risk  You're considered to be at risk if you have a family history of AAA    7  Lung Cancer Screening: covers low dose CT scan once per year if you meet all of the following conditions: (1) Age 50-69; (2) No signs or symptoms of lung cancer; (3) Current smoker or have quit smoking within the last 15 years; (4) You have a tobacco smoking history of at least 20 pack years (packs per day multiplied by number of years you smoked); (5) You get a written order from a healthcare provider  8  Glaucoma Screening: covered annually if you're considered high risk: (1) You have diabetes OR (2) Family history of glaucoma OR (3)  aged 48 and older OR (3)  American aged 72 and older  5  Osteoporosis Screening: covered every 2 years if you meet one of the following conditions: (1) You're estrogen deficient and at risk for osteoporosis based off medical history and other findings; (2) Have a vertebral abnormality; (3) On glucocorticoid therapy for more than 3 months; (4) Have primary hyperparathyroidism; (5) On osteoporosis medications and need to assess response to drug therapy  · Last bone density test (DXA Scan): Not on file  10  HIV Screening: covered annually if you're between the age of 12-76  Also covered annually if you are younger than 13 and older than 72 with risk factors for HIV infection  For pregnant patients, it is covered up to 3 times per pregnancy  Immunizations:  Immunization Recommendations   Influenza Vaccine Annual influenza vaccination during flu season is recommended for all persons aged >= 6 months who do not have contraindications   Pneumococcal Vaccine   * Pneumococcal conjugate vaccine = PCV13 (Prevnar 13), PCV15 (Vaxneuvance), PCV20 (Prevnar 20)  * Pneumococcal polysaccharide vaccine = PPSV23 (Pneumovax) Adults 25-60 years old: 1-3 doses may be recommended based on certain risk factors  Adults 72 years old: 1-2 doses may be recommended based off what pneumonia vaccine you previously received   Hepatitis B Vaccine 3 dose series if at intermediate or high risk (ex: diabetes, end stage renal disease, liver disease)   Tetanus (Td) Vaccine - COST NOT COVERED BY MEDICARE PART B Following completion of primary series, a booster dose should be given every 10 years to maintain immunity against tetanus   Td may also be given as tetanus wound prophylaxis  Tdap Vaccine - COST NOT COVERED BY MEDICARE PART B Recommended at least once for all adults  For pregnant patients, recommended with each pregnancy  Shingles Vaccine (Shingrix) - COST NOT COVERED BY MEDICARE PART B  2 shot series recommended in those aged 48 and above     Health Maintenance Due:      Topic Date Due   • Hepatitis C Screening  Never done   • HIV Screening  Never done   • Cervical Cancer Screening  Never done     Immunizations Due:      Topic Date Due   • COVID-19 Vaccine (1) Never done   • Hepatitis A Vaccine (2 of 2 - 2-dose series) 10/15/2010   • Pneumococcal Vaccine: Pediatrics (0 to 5 Years) and At-Risk Patients (6 to 59 Years) (2 - PCV) 02/28/2017     Advance Directives   What are advance directives? Advance directives are legal documents that state your wishes and plans for medical care  These plans are made ahead of time in case you lose your ability to make decisions for yourself  Advance directives can apply to any medical decision, such as the treatments you want, and if you want to donate organs  What are the types of advance directives? There are many types of advance directives, and each state has rules about how to use them  You may choose a combination of any of the following:  · Living will: This is a written record of the treatment you want  You can also choose which treatments you do not want, which to limit, and which to stop at a certain time  This includes surgery, medicine, IV fluid, and tube feedings  · Durable power of  for healthcare [facility]): This is a written record that states who you want to make healthcare choices for you when you are unable to make them for yourself  This person, called a proxy, is usually a family member or a friend  You may choose more than 1 proxy  · Do not resuscitate (DNR) order:  A DNR order is used in case your heart stops beating or you stop breathing   It is a request not to have certain forms of treatment, such as CPR  A DNR order may be included in other types of advance directives  · Medical directive: This covers the care that you want if you are in a coma, near death, or unable to make decisions for yourself  You can list the treatments you want for each condition  Treatment may include pain medicine, surgery, blood transfusions, dialysis, IV or tube feedings, and a ventilator (breathing machine)  · Values history: This document has questions about your views, beliefs, and how you feel and think about life  This information can help others choose the care that you would choose  Why are advance directives important? An advance directive helps you control your care  Although spoken wishes may be used, it is better to have your wishes written down  Spoken wishes can be misunderstood, or not followed  Treatments may be given even if you do not want them  An advance directive may make it easier for your family to make difficult choices about your care  Urinary Incontinence   Urinary incontinence (UI)  is when you lose control of your bladder  UI develops because your bladder cannot store or empty urine properly  The 3 most common types of UI are stress incontinence, urge incontinence, or both  Medicines:   · May be given to help strengthen your bladder control  Report any side effects of medication to your healthcare provider  Do pelvic muscle exercises often:  Your pelvic muscles help you stop urinating  Squeeze these muscles tight for 5 seconds, then relax for 5 seconds  Gradually work up to squeezing for 10 seconds  Do 3 sets of 15 repetitions a day, or as directed  This will help strengthen your pelvic muscles and improve bladder control  Train your bladder:  Go to the bathroom at set times, such as every 2 hours, even if you do not feel the urge to go  You can also try to hold your urine when you feel the urge to go  For example, hold your urine for 5 minutes when you feel the urge to go   As that becomes easier, hold your urine for 10 minutes  Self-care:   · Keep a UI record  Write down how often you leak urine and how much you leak  Make a note of what you were doing when you leaked urine  · Drink liquids as directed  You may need to limit the amount of liquid you drink to help control your urine leakage  Do not drink any liquid right before you go to bed  Limit or do not have drinks that contain caffeine or alcohol  · Prevent constipation  Eat a variety of high-fiber foods  Good examples are high-fiber cereals, beans, vegetables, and whole-grain breads  Walking is the best way to trigger your intestines to have a bowel movement  · Exercise regularly and maintain a healthy weight  Weight loss and exercise will decrease pressure on your bladder and help you control your leakage  · Use a catheter as directed  to help empty your bladder  A catheter is a tiny, plastic tube that is put into your bladder to drain your urine  · Go to behavior therapy as directed  Behavior therapy may be used to help you learn to control your urge to urinate  Weight Management   Why it is important to manage your weight:  Being overweight increases your risk of health conditions such as heart disease, high blood pressure, type 2 diabetes, and certain types of cancer  It can also increase your risk for osteoarthritis, sleep apnea, and other respiratory problems  Aim for a slow, steady weight loss  Even a small amount of weight loss can lower your risk of health problems  How to lose weight safely:  A safe and healthy way to lose weight is to eat fewer calories and get regular exercise  You can lose up about 1 pound a week by decreasing the number of calories you eat by 500 calories each day  Healthy meal plan for weight management:  A healthy meal plan includes a variety of foods, contains fewer calories, and helps you stay healthy  A healthy meal plan includes the following:  · Eat whole-grain foods more often    A healthy meal plan should contain fiber  Fiber is the part of grains, fruits, and vegetables that is not broken down by your body  Whole-grain foods are healthy and provide extra fiber in your diet  Some examples of whole-grain foods are whole-wheat breads and pastas, oatmeal, brown rice, and bulgur  · Eat a variety of vegetables every day  Include dark, leafy greens such as spinach, kale, sean greens, and mustard greens  Eat yellow and orange vegetables such as carrots, sweet potatoes, and winter squash  · Eat a variety of fruits every day  Choose fresh or canned fruit (canned in its own juice or light syrup) instead of juice  Fruit juice has very little or no fiber  · Eat low-fat dairy foods  Drink fat-free (skim) milk or 1% milk  Eat fat-free yogurt and low-fat cottage cheese  Try low-fat cheeses such as mozzarella and other reduced-fat cheeses  · Choose meat and other protein foods that are low in fat  Choose beans or other legumes such as split peas or lentils  Choose fish, skinless poultry (chicken or turkey), or lean cuts of red meat (beef or pork)  Before you cook meat or poultry, cut off any visible fat  · Use less fat and oil  Try baking foods instead of frying them  Add less fat, such as margarine, sour cream, regular salad dressing and mayonnaise to foods  Eat fewer high-fat foods  Some examples of high-fat foods include french fries, doughnuts, ice cream, and cakes  · Eat fewer sweets  Limit foods and drinks that are high in sugar  This includes candy, cookies, regular soda, and sweetened drinks  Exercise:  Exercise at least 30 minutes per day on most days of the week  Some examples of exercise include walking, biking, dancing, and swimming  You can also fit in more physical activity by taking the stairs instead of the elevator or parking farther away from stores  Ask your healthcare provider about the best exercise plan for you        © Copyright [employer] 2018 Information is for End User's use only and may not be sold, redistributed or otherwise used for commercial purposes  All illustrations and images included in CareNotes® are the copyrighted property of Jacey WALL  or [employer]   AMBULATORY CARE:   A heart healthy diet  is an eating plan low in unhealthy fats and sodium (salt)  The plan is high in healthy fats and fiber  A heart healthy diet helps improve your cholesterol levels and lowers your risk for heart disease and stroke  A dietitian will teach you how to read and understand food labels  Heart healthy diet guidelines to follow:   · Choose foods that contain healthy fats  ? Unsaturated fats  include monounsaturated and polyunsaturated fats  Unsaturated fat is found in foods such as soybean, canola, olive, corn, and safflower oils  It is also found in soft tub margarine that is made with liquid vegetable oil  ? Omega-3 fat  is found in certain fish, such as salmon, tuna, and trout, and in walnuts and flaxseed  Eat fish high in omega-3 fats at least 2 times a week  · Get 20 to 30 grams of fiber each day  Fruits, vegetables, whole-grain foods, and legumes (cooked beans) are good sources of fiber  · Limit or do not have unhealthy fats  ? Cholesterol  is found in animal foods, such as eggs and lobster, and in dairy products made from whole milk  Limit cholesterol to less than 200 mg each day  ? Saturated fat  is found in meats, such as jiménez and hamburger  It is also found in chicken or turkey skin, whole milk, and butter  Limit saturated fat to less than 7% of your total daily calories  ? Trans fat  is found in packaged foods, such as potato chips and cookies  It is also in hard margarine, some fried foods, and shortening  Do not eat foods that contain trans fats  · Limit sodium as directed  You may be told to limit sodium to 2,000 to 2,300 mg each day  Choose low-sodium or no-salt-added foods   Add little or no salt to food you prepare  Use herbs and spices in place of salt  Include the following in your heart healthy plan:  Ask your dietitian or healthcare provider how many servings to have from each of the following food groups:  · Grains:      ? Whole-wheat breads, cereals, and pastas, and brown rice    ? Low-fat, low-sodium crackers and chips    · Vegetables:      ? Broccoli, green beans, green peas, and spinach    ? Collards, kale, and lima beans    ? Carrots, sweet potatoes, tomatoes, and peppers    ? Canned vegetables with no salt added    · Fruits:      ? Bananas, peaches, pears, and pineapple    ? Grapes, raisins, and dates    ? Oranges, tangerines, grapefruit, orange juice, and grapefruit juice    ? Apricots, mangoes, melons, and papaya    ? Raspberries and strawberries    ? Canned fruit with no added sugar    · Low-fat dairy:      ? Nonfat (skim) milk, 1% milk, and low-fat almond, cashew, or soy milks fortified with calcium    ? Low-fat cheese, regular or frozen yogurt, and cottage cheese    · Meats and proteins:      ? Lean cuts of beef and pork (loin, leg, round), skinless chicken and turkey    ? Legumes, soy products, egg whites, or nuts    Limit or do not include the following in your heart healthy plan:   · Unhealthy fats and oils:      ? Whole or 2% milk, cream cheese, sour cream, or cheese    ? High-fat cuts of beef (T-bone steaks, ribs), chicken or turkey with skin, and organ meats such as liver    ? Butter, stick margarine, shortening, and cooking oils such as coconut or palm oil    · Foods and liquids high in sodium:      ? Packaged foods, such as frozen dinners, cookies, macaroni and cheese, and cereals with more than 300 mg of sodium per serving    ? Vegetables with added sodium, such as instant potatoes, vegetables with added sauces, or regular canned vegetables    ? Cured or smoked meats, such as hot dogs, jiménez, and sausage    ?  High-sodium ketchup, barbecue sauce, salad dressing, pickles, olives, soy sauce, or miso    · Foods and liquids high in sugar:      ? Candy, cake, cookies, pies, or doughnuts    ? Soft drinks (soda), sports drinks, or sweetened tea    ? Canned or dry mixes for cakes, soups, sauces, or gravies    Other healthy heart guidelines:   · Do not smoke  Nicotine and other chemicals in cigarettes and cigars can cause lung and heart damage  Ask your healthcare provider for information if you currently smoke and need help to quit  E-cigarettes or smokeless tobacco still contain nicotine  Talk to your healthcare provider before you use these products  · Limit or do not drink alcohol as directed  Alcohol can damage your heart and raise your blood pressure  Your healthcare provider may give you specific daily and weekly limits  The general recommended limit is 1 drink a day for women 21 or older and for men 72 or older  Do not have more than 3 drinks in a day or 7 in a week  The recommended limit is 2 drinks a day for men 24to 59years of age  Do not have more than 4 drinks in a day or 14 in a week  A drink of alcohol is 12 ounces of beer, 5 ounces of wine, or 1½ ounces of liquor  · Exercise regularly  Exercise can help you maintain a healthy weight and improve your blood pressure and cholesterol levels  Regular exercise can also decrease your risk for heart problems  Ask your healthcare provider about the best exercise plan for you  Do not start an exercise program without asking your healthcare provider  Follow up with your doctor or cardiologist as directed:  Write down your questions so you remember to ask them during your visits  © Copyright [address] 2022 Information is for End User's use only and may not be sold, redistributed or otherwise used for commercial purposes  All illustrations and images included in CareNotes® are the copyrighted property of [employer]  or [employer]   The above information is an  only   It is not intended as medical advice for individual conditions or treatments  Talk to your doctor, nurse or pharmacist before following any medical regimen to see if it is safe and effective for you

## 2022-11-30 NOTE — PROGRESS NOTES
Assessment and Plan:   I refilled her EpiPen  Continue to follow up with her obstetrician  I will see her after she has her baby  At that time will consider putting her back Lamictal   I encouraged her to seek counseling for her bipolar  Problem List Items Addressed This Visit        Other    Bipolar disorder, current episode mixed, mild ([facility] [address] )   Other Visit Diagnoses     Medicare annual wellness visit, subsequent    -  Primary    BMI 39 0-39 9,adult        Bee sting reaction        Relevant Medications    EPINEPHrine (EPIPEN) 0 3 mg/0 3 mL SOAJ           Preventive health issues were discussed with patient, and age appropriate screening tests were ordered as noted in patient's After Visit Summary  Personalized health advice and appropriate referrals for health education or preventive services given if needed, as noted in patient's After Visit Summary  History of Present Illness:     Patient presents for a Medicare Wellness Visit    Medicare well visit  Patient denies any chest pain shortness breast   Patient is 13 weeks pregnant  Seeing an obstetrician  Patient is not on the Lamictal for her bipolar because she is pregnant  It did help her bipolar when she was on it  Patient Care Team:  Rock Sanderson, DO as PCP - General (Family Medicine)     Review of Systems:     Review of Systems   Constitutional: Negative  Respiratory: Negative  Cardiovascular: Negative  Gastrointestinal: Negative  Genitourinary: Negative           Problem List:     Patient Active Problem List   Diagnosis   • Bipolar disorder, current episode mixed, mild (HCC)   • Bee sting allergy   • Abscess   • Enlarged liver   • Asthma   • Hidradenitis suppurativa      Past Medical and Surgical History:     Past Medical History:   Diagnosis Date   • Asthma    • Bipolar 1 disorder ([facility] [address] )    • Closed right radial fracture    • High-risk pregnancy    • PTSD (post-traumatic stress disorder)      Past Surgical History:   Procedure Laterality Date   • COLONOSCOPY     • MOUTH SURGERY     • WISDOM TOOTH EXTRACTION        Family History:     Family History   Problem Relation Age of Onset   • Diabetes Mother    • Heart attack Mother    • Heart disease Mother    • Hypertension Mother    • Mental illness Father    • Diabetes Maternal Grandmother    • Hypertension Maternal Grandmother    • Cancer Paternal Grandmother    • Diabetes Maternal Uncle    • Heart disease Maternal Uncle       Social History:     Social History     Socioeconomic History   • Marital status: Single     Spouse name: None   • Number of children: None   • Years of education: None   • Highest education level: None   Occupational History   • None   Tobacco Use   • Smoking status: Former   • Smokeless tobacco: Never   Substance and Sexual Activity   • Alcohol use: Not Currently     Comment: social   • Drug use: Never   • Sexual activity: None   Other Topics Concern   • None   Social History Narrative   • None     Social Determinants of Health     Financial Resource Strain: Low Risk    • Difficulty of Paying Living Expenses: Not hard at all   Food Insecurity: Not on file   Transportation Needs: Unmet Transportation Needs   • Lack of Transportation (Medical): Yes   • Lack of Transportation (Non-Medical): Yes   Physical Activity: Not on file   Stress: Not on file   Social Connections: Not on file   Intimate Partner Violence: Not on file   Housing Stability: Not on file      Medications and Allergies:     Current Outpatient Medications   Medication Sig Dispense Refill   • albuterol (PROVENTIL HFA,VENTOLIN HFA) 90 mcg/act inhaler Inhale 2 puffs every 6 (six) hours as needed for wheezing or shortness of breath 18 g 1   • EPINEPHrine (EPIPEN) 0 3 mg/0 3 mL SOAJ Inject 0 3 mL (0 3 mg total) into a muscle once for 1 dose 0 6 mL 1   • Prenatal Vit-Fe Fumarate-FA (PRENATAL PO) Take by mouth       No current facility-administered medications for this visit       Allergies   Allergen Reactions   • Bee Venom    • Bupropion Other (See Comments)     "shaky"      Immunizations:     Immunization History   Administered Date(s) Administered   • DTP 03/10/1993, 06/11/1993, 06/20/1994, 07/25/1996, 04/09/1998   • HPV Quadrivalent 04/15/2010, 06/17/2010   • Hep B, Adolescent or Pediatric 07/25/1996, 09/30/1996, 09/10/1997, 10/19/2012   • Hepatitis A 04/15/2010   • INFLUENZA 10/23/2012, 11/06/2014, 10/22/2015, 11/02/2016, 09/26/2017, 10/23/2017, 11/14/2017, 12/06/2018, 11/11/2019, 11/15/2022   • IPV 04/09/1998   • Influenza, injectable, quadrivalent, preservative free 0 5 mL 11/11/2019   • MMR 06/20/1994, 04/09/1998, 04/29/2013   • Meningococcal MCV4P 04/15/2010   • OPV 03/10/1993, 06/11/1993, 06/20/1994, 07/25/1996   • Pneumococcal Polysaccharide PPV23 02/29/2016   • Rabies 06/25/2014, 06/29/2014   • Rabies, Intramuscular 06/25/2014, 06/29/2014   • Tdap 12/19/2006, 04/29/2013, 01/22/2015, 02/16/2016, 11/14/2017, 11/15/2017, 02/06/2019, 06/01/2022   • Tuberculin Skin Test-PPD Intradermal 04/15/2009      Health Maintenance:         Topic Date Due   • Hepatitis C Screening  Never done   • HIV Screening  Never done   • Cervical Cancer Screening  Never done         Topic Date Due   • COVID-19 Vaccine (1) Never done   • Hepatitis A Vaccine (2 of 2 - 2-dose series) 10/15/2010   • Pneumococcal Vaccine: Pediatrics (0 to 5 Years) and At-Risk Patients (6 to 59 Years) (2 - PCV) 02/28/2017      Medicare Screening Tests and Risk Assessments:     Jordin Hunter is here for her Subsequent Wellness visit  Last Medicare Wellness visit information reviewed, patient interviewed and updates made to the record today  Health Risk Assessment:   Patient rates overall health as fair  Patient feels that their physical health rating is slightly worse  Patient is satisfied with their life  Eyesight was rated as same  Hearing was rated as slightly worse  Patient feels that their emotional and mental health rating is same   Patients states they are often angry  Patient states they are always unusually tired/fatigued  Pain experienced in the last 7 days has been some  Patient's pain rating has been 1/10  Patient states that she has experienced weight loss or gain in last 6 months  Depression Screening:   PHQ-2 Score: 0      Fall Risk Screening: In the past year, patient has experienced: history of falling in past year    Number of falls: 1  Injured during fall?: Yes    Feels unsteady when standing or walking?: No    Worried about falling?: No      Urinary Incontinence Screening:   Patient has leaked urine accidently in the last six months  Home Safety:  Patient does not have trouble with stairs inside or outside of their home  Patient has working smoke alarms and has working carbon monoxide detector  Nutrition:   Current diet is Regular  Medications:   Patient is currently taking over-the-counter supplements  OTC medications include: see medication list  Patient is able to manage medications  Activities of Daily Living (ADLs)/Instrumental Activities of Daily Living (IADLs):   Walk and transfer into and out of bed and chair?: Yes  Dress and groom yourself?: Yes    Bathe or shower yourself?: Yes    Feed yourself?  Yes  Do your laundry/housekeeping?: Yes  Manage your money, pay your bills and track your expenses?: Yes  Make your own meals?: Yes    Do your own shopping?: Yes    Previous Hospitalizations:   Any hospitalizations or ED visits within the last 12 months?: Yes    How many hospitalizations have you had in the last year?: 1-2    Hospitalization Comments: Gave birth    Advance Care Planning:   Living will: No    Durable POA for healthcare: No    Advanced directive: No    Advanced directive counseling given: No      Cognitive Screening:   Provider or family/friend/caregiver concerned regarding cognition?: No    PREVENTIVE SCREENINGS      Cardiovascular Screening:    General: Screening Current      Breast Cancer Screening:     General: Screening Not Indicated      Lung Cancer Screening:     General: Screening Not Indicated    Screening, Brief Intervention, and Referral to Treatment (SBIRT)    Screening  Typical number of drinks in a day: 0  Typical number of drinks in a week: 0  Interpretation: Low risk drinking behavior  Single Item Drug Screening:  How often have you used an illegal drug (including marijuana) or a prescription medication for non-medical reasons in the past year? never    Single Item Drug Screen Score: 0  Interpretation: Negative screen for possible drug use disorder    No results found  Physical Exam:     /64   Pulse 98   Temp 97 6 °F (36 4 °C)   Ht 5' 2" (1 575 m)   Wt 98 6 kg (217 lb 6 4 oz)   SpO2 97%   BMI 39 76 kg/m²     Physical Exam  Vitals reviewed  Constitutional:       General: She is not in acute distress  Appearance: Normal appearance  She is well-developed  She is not diaphoretic  HENT:      Head: Normocephalic and atraumatic  Eyes:      Conjunctiva/sclera: Conjunctivae normal    Cardiovascular:      Rate and Rhythm: Normal rate and regular rhythm  Heart sounds: Normal heart sounds  No murmur heard  No friction rub  No gallop  Pulmonary:      Effort: Pulmonary effort is normal  No respiratory distress  Breath sounds: Normal breath sounds  No wheezing, rhonchi or rales  Musculoskeletal:      Right lower leg: No edema  Left lower leg: No edema  Neurological:      General: No focal deficit present  Mental Status: She is alert and oriented to person, place, and time  Psychiatric:         Mood and Affect: Mood normal          Behavior: Behavior normal          Thought Content: Thought content normal          Judgment: Judgment normal           Terrial Sebastian, DO  BMI Counseling: Body mass index is 39 76 kg/m²   The BMI is above normal  Nutrition recommendations include reducing portion sizes, 3-5 servings of fruits/vegetables daily, reducing fast food intake, consuming healthier snacks, decreasing soda and/or juice intake, moderation in carbohydrate intake, increasing intake of lean protein, reducing intake of saturated fat and trans fat and reducing intake of cholesterol

## 2022-12-21 ENCOUNTER — TELEPHONE (OUTPATIENT)
Dept: FAMILY MEDICINE CLINIC | Facility: CLINIC | Age: 32
End: 2022-12-21

## 2022-12-21 DIAGNOSIS — F31.61 BIPOLAR DISORDER, CURRENT EPISODE MIXED, MILD (HCC): Primary | ICD-10-CM

## 2022-12-21 RX ORDER — LAMOTRIGINE 100 MG/1
TABLET ORAL
Qty: 45 TABLET | Refills: 1 | Status: SHIPPED | OUTPATIENT
Start: 2022-12-21

## 2022-12-21 NOTE — TELEPHONE ENCOUNTER
Pt would like to go back on Lamictal   States she lost her baby at the beginning of the month      Has appointment scheduled with you on Friday 12/23/22

## 2022-12-22 ENCOUNTER — RA CDI HCC (OUTPATIENT)
Dept: OTHER | Facility: HOSPITAL | Age: 32
End: 2022-12-22

## 2022-12-22 NOTE — PROGRESS NOTES
[facility] [address]  coding opportunities       Chart reviewed, no opportunity found: CHART REVIEWED, NO OPPORTUNITY FOUND        Patients Insurance     Medicare Insurance: Medicare

## 2023-02-27 DIAGNOSIS — F31.61 BIPOLAR DISORDER, CURRENT EPISODE MIXED, MILD (HCC): ICD-10-CM

## 2023-02-27 RX ORDER — LAMOTRIGINE 100 MG/1
TABLET ORAL
Qty: 45 TABLET | Refills: 1 | Status: SHIPPED | OUTPATIENT
Start: 2023-02-27

## 2023-03-02 ENCOUNTER — OFFICE VISIT (OUTPATIENT)
Dept: SURGERY | Facility: CLINIC | Age: 33
End: 2023-03-02

## 2023-03-02 ENCOUNTER — LAB REQUISITION (OUTPATIENT)
Dept: LAB | Facility: HOSPITAL | Age: 33
End: 2023-03-02

## 2023-03-02 VITALS
HEIGHT: 62 IN | SYSTOLIC BLOOD PRESSURE: 122 MMHG | DIASTOLIC BLOOD PRESSURE: 70 MMHG | BODY MASS INDEX: 38.94 KG/M2 | TEMPERATURE: 98.2 F | HEART RATE: 89 BPM | OXYGEN SATURATION: 97 % | WEIGHT: 211.6 LBS

## 2023-03-02 DIAGNOSIS — L73.2 HIDRADENITIS SUPPURATIVA: ICD-10-CM

## 2023-03-02 DIAGNOSIS — L73.2 HYDRADENITIS: Primary | ICD-10-CM

## 2023-03-02 RX ORDER — DOXYCYCLINE 100 MG/1
100 TABLET ORAL 2 TIMES DAILY
Qty: 14 TABLET | Refills: 0 | Status: SHIPPED | OUTPATIENT
Start: 2023-03-02 | End: 2023-03-09

## 2023-03-02 NOTE — PROGRESS NOTES
Incision and Drainage    Date/Time: 3/2/2023 4:33 PM  Performed by: Charlene Zapata MD  Authorized by: Charlene Zapata MD   Universal Protocol:  Consent: Verbal consent obtained  Risks and benefits: risks, benefits and alternatives were discussed  Consent given by: patient  Patient understanding: patient states understanding of the procedure being performed  Patient identity confirmed: verbally with patient      Patient location:  Clinic  Location:     Type:  Abscess    Size:  Two right axillary abscesses 2/2 hidradinitis 2cm each    Location: axillary - right  Pre-procedure details:     Skin preparation:  Chloraprep  Anesthesia (see MAR for exact dosages): Anesthesia method:  Local infiltration    Local anesthetic:  Bupivacaine 0 25% WITH epi  Procedure details:     Complexity:  Simple    Incision types:  Cruciate    Scalpel blade:  11    Approach:  Open    Incision depth:  Deep    Wound management:  Irrigated with saline and probed and deloculated    Drainage:  Bloody    Drainage amount: Moderate    Wound treatment:  Packing placed    Packing materials:  1/2 in gauze  Post-procedure details:     Patient tolerance of procedure:   Tolerated well, no immediate complications

## 2023-03-02 NOTE — ASSESSMENT & PLAN NOTE
29 y o  female known to our service and last seen in the office on 9/19/2022 for recurrent hidradenitis of bilateral axilla  Patient endorses additional areas of hidradenitis throughout body including groin  Current acute complaint is the right axilla with two areas of active drainage  I&D performed bedside (see additional procedure note)  Culture sent from procedure  Area x 2 packed with 1/2 inch plain packing  Patient will remove the packing after 24hrs  She will continue good hygiene and keep the area clean and dry  Okay to use guaze dressing for drainage  Additionally will prescribed doxycycline course x 1 week  Patient will follow up in the office in 1 week for a wound check  Referral to be placed for Dermatology for consideration of systemic therapies

## 2023-03-02 NOTE — PROGRESS NOTES
General Surgery  Rafaela Caron Ann : 1990  MRN: [id number]      Assessment/Plan:    Hidradenitis suppurativa  29 y o  female known to our service and last seen in the office on 2022 for recurrent hidradenitis of bilateral axilla  Patient endorses additional areas of hidradenitis throughout body including groin  Current acute complaint is the right axilla with two areas of active drainage  I&D performed bedside (see additional procedure note)  Culture sent from procedure  Area x 2 packed with 1/2 inch plain packing  Patient will remove the packing after 24hrs  She will continue good hygiene and keep the area clean and dry  Okay to use guaze dressing for drainage  Additionally will prescribed doxycycline course x 1 week  Patient will follow up in the office in 1 week for a wound check  Referral to be placed for Dermatology for consideration of systemic therapies  Diagnoses and all orders for this visit:    Hydradenitis  -     Wound culture and Gram stain; Future  -     doxycycline (ADOXA) 100 MG tablet; Take 1 tablet (100 mg total) by mouth 2 (two) times a day for 7 days  -     Incision and Drainage    Hidradenitis suppurativa          Subjective:      Patient ID: Stephanie Palacio is a 29 y o  female  Stephanie Palacio is a 29 y o  female with a  PMH asthma, BPD, hidradenitis, who presents with recurrent cystic lesions to her bilateral axilla  Patient currently has actively draining lesions x 2 to her right axilla  Drainage is bloody appearing  Endorses anxiety associated with the lesions  She endorses pain to the areas as well as discoloration and thickening of the surrounding skin  These symptoms have been ongoing with her underlying diagnosis of hidradenitis         The following portions of the patient's history were reviewed and updated as appropriate:   She  has a past medical history of Asthma, Bipolar 1 disorder ([address] ), Closed right radial fracture, High-risk pregnancy, and PTSD (post-traumatic stress disorder)  She   Patient Active Problem List    Diagnosis Date Noted   • Hidradenitis suppurativa 12/21/2021   • Asthma 08/17/2021   • Enlarged liver 10/27/2020   • Abscess 12/18/2019   • Bipolar disorder, current episode mixed, mild ([facility] [address] ) 11/11/2019   • Bee sting allergy 11/11/2019     She  has a past surgical history that includes Mouth surgery; Colonoscopy; and Ismay tooth extraction  Her family history includes Cancer in her paternal grandmother; Diabetes in her maternal grandmother, maternal uncle, and mother; Heart attack in her mother; Heart disease in her maternal uncle and mother; Hypertension in her maternal grandmother and mother; Mental illness in her father  She  reports that she has quit smoking  She has never used smokeless tobacco  She reports that she does not currently use alcohol  She reports that she does not use drugs  Current Outpatient Medications   Medication Sig Dispense Refill   • albuterol (PROVENTIL HFA,VENTOLIN HFA) 90 mcg/act inhaler Inhale 2 puffs every 6 (six) hours as needed for wheezing or shortness of breath 18 g 1   • doxycycline (ADOXA) 100 MG tablet Take 1 tablet (100 mg total) by mouth 2 (two) times a day for 7 days 14 tablet 0   • lamoTRIgine (LaMICtal) 100 mg tablet 1/2 tab po daily for 2 weeks, then 1 tab daily for 1 week, then 1 5 tablets daily 45 tablet 1   • EPINEPHrine (EPIPEN) 0 3 mg/0 3 mL SOAJ Inject 0 3 mL (0 3 mg total) into a muscle once for 1 dose 0 6 mL 1   • Prenatal Vit-Fe Fumarate-FA (PRENATAL PO) Take by mouth (Patient not taking: Reported on 3/2/2023)       No current facility-administered medications for this visit  She is allergic to bee venom and bupropion       Review of Systems   Constitutional: Negative  HENT: Negative  Eyes: Negative  Respiratory: Negative  Cardiovascular: Negative  Gastrointestinal: Negative  Endocrine: Negative  Genitourinary: Negative  Musculoskeletal: Negative      Skin: Positive for wound  Allergic/Immunologic: Negative  Neurological: Negative  Hematological: Negative  Psychiatric/Behavioral: Negative  All other systems reviewed and are negative  Objective:      /70 (BP Location: Left arm, Patient Position: Sitting, Cuff Size: Standard)   Pulse 89   Temp 98 2 °F (36 8 °C) (Tympanic)   Ht 5' 2" (1 575 m)   Wt 96 kg (211 lb 9 6 oz)   SpO2 97%   BMI 38 70 kg/m²          Physical Exam  Vitals reviewed  Constitutional:       Appearance: She is obese  She is not toxic-appearing or diaphoretic  HENT:      Head: Normocephalic and atraumatic  Right Ear: External ear normal       Left Ear: External ear normal       Nose: Nose normal       Mouth/Throat:      Mouth: Mucous membranes are moist       Pharynx: Oropharynx is clear  Eyes:      Extraocular Movements: Extraocular movements intact  Conjunctiva/sclera: Conjunctivae normal    Cardiovascular:      Rate and Rhythm: Normal rate  Pulmonary:      Effort: Pulmonary effort is normal  No respiratory distress  Musculoskeletal:         General: No swelling  Normal range of motion  Cervical back: Normal range of motion  Skin:     General: Skin is warm and dry  Comments: Right axilla with multiple areas of pitting with thickened discolored skin consistent with hydradenitis, two areas open with active drainage   Neurological:      General: No focal deficit present  Mental Status: She is alert  Cranial Nerves: No cranial nerve deficit  Psychiatric:         Mood and Affect: Mood normal          Thought Content: Thought content normal          In preparation for this encounter the following was reviewed:  Family Medicine Wellness Visit 11/30/22, General Surgery office visits 9/29/22, 12/21/21, emergency medicine encounter from 12/16/21

## 2023-03-04 LAB
BACTERIA WND AEROBE CULT: ABNORMAL
BACTERIA WND AEROBE CULT: ABNORMAL
GRAM STN SPEC: ABNORMAL
GRAM STN SPEC: ABNORMAL

## 2023-03-20 ENCOUNTER — OFFICE VISIT (OUTPATIENT)
Dept: SURGERY | Facility: CLINIC | Age: 33
End: 2023-03-20

## 2023-03-20 VITALS
DIASTOLIC BLOOD PRESSURE: 76 MMHG | SYSTOLIC BLOOD PRESSURE: 124 MMHG | HEART RATE: 84 BPM | TEMPERATURE: 97.6 F | BODY MASS INDEX: 39.01 KG/M2 | WEIGHT: 212 LBS | HEIGHT: 62 IN | OXYGEN SATURATION: 94 %

## 2023-03-20 DIAGNOSIS — L73.2 HIDRADENITIS SUPPURATIVA: Primary | ICD-10-CM

## 2023-03-20 RX ORDER — DOXYCYCLINE HYCLATE 100 MG/1
100 TABLET, DELAYED RELEASE ORAL 2 TIMES DAILY
Qty: 28 TABLET | Refills: 0 | Status: SHIPPED | OUTPATIENT
Start: 2023-03-20 | End: 2023-04-03

## 2023-03-20 NOTE — PROGRESS NOTES
Office Visit - General Surgery  Matias Martin MRN: [id number]  Encounter: [id number]    Assessment and Plan  Problem List Items Addressed This Visit        Musculoskeletal and Integument    Hidradenitis suppurativa - Primary    Relevant Medications    doxycycline (DORYX) 100 MG EC tablet     32 F p/w Iglesia II hidradenitis suppurativa, s/p incision and drainage and trial f doxycycline with improvement of R axillary lesions, one new underarm focus of fluctuance today  No signs or symptoms of systemic infection  -I&D performed in office, see separate procedural documentation  -7 days oral doxycycline prescribed  -f/u in 1 week for wound check  -Must follow-up with dermatology to assess increasing medical management intensity, possible humira vs additional biologic therapy etc      Chief Complaint:  Matias Martin is a 29 y o  female who presents for persistently draining hidradenitis suppurativa  Subjective  Continues to report pain under arm, persistent drainage and malodor  Has not seen dermatology since her last visit due to difficulty scheduling   Does report improvement in 2 sites which were previously    Past Medical History:   Diagnosis Date   • Asthma    • Bipolar 1 disorder ([facility] [address] )    • Closed right radial fracture    • High-risk pregnancy    • PTSD (post-traumatic stress disorder)        Past Surgical History:   Procedure Laterality Date   • COLONOSCOPY     • MOUTH SURGERY     • WISDOM TOOTH EXTRACTION         Family History   Problem Relation Age of Onset   • Diabetes Mother    • Heart attack Mother    • Heart disease Mother    • Hypertension Mother    • Mental illness Father    • Diabetes Maternal Grandmother    • Hypertension Maternal Grandmother    • Cancer Paternal Grandmother    • Diabetes Maternal Uncle    • Heart disease Maternal Uncle        Social History     Tobacco Use   • Smoking status: Former   • Smokeless tobacco: Never   Substance Use Topics   • Alcohol use: Not Currently Comment: social   • Drug use: Never        Medications  Current Outpatient Medications on File Prior to Visit   Medication Sig Dispense Refill   • albuterol (PROVENTIL HFA,VENTOLIN HFA) 90 mcg/act inhaler Inhale 2 puffs every 6 (six) hours as needed for wheezing or shortness of breath 18 g 1   • lamoTRIgine (LaMICtal) 100 mg tablet 1/2 tab po daily for 2 weeks, then 1 tab daily for 1 week, then 1 5 tablets daily 45 tablet 1   • EPINEPHrine (EPIPEN) 0 3 mg/0 3 mL SOAJ Inject 0 3 mL (0 3 mg total) into a muscle once for 1 dose 0 6 mL 1   • Prenatal Vit-Fe Fumarate-FA (PRENATAL PO) Take by mouth (Patient not taking: Reported on 3/2/2023)       No current facility-administered medications on file prior to visit  Allergies  Allergies   Allergen Reactions   • Bee Venom    • Bupropion Other (See Comments)     "shaky"       Review of Systems   Constitutional: Negative for chills, diaphoresis and fever  Skin: Positive for wound  All other systems reviewed and are negative  Objective  Vitals:    03/20/23 1546   BP: 124/76   Pulse: 84   Temp: 97 6 °F (36 4 °C)   SpO2: 94%       Physical Exam  Constitutional:       General: She is not in acute distress  Appearance: She is obese  She is not ill-appearing or diaphoretic  HENT:      Head: Normocephalic and atraumatic  Nose: Nose normal       Mouth/Throat:      Mouth: Mucous membranes are moist    Eyes:      General: No scleral icterus  Pupils: Pupils are equal, round, and reactive to light  Cardiovascular:      Rate and Rhythm: Normal rate and regular rhythm  Pulmonary:      Effort: Pulmonary effort is normal  No respiratory distress  Abdominal:      Palpations: Abdomen is soft  Tenderness: There is no abdominal tenderness  Musculoskeletal:         General: No swelling, tenderness or deformity  Normal range of motion  Cervical back: Normal range of motion  No rigidity  Lymphadenopathy:      Cervical: No cervical adenopathy  Skin:     General: Skin is warm and dry  Capillary Refill: Capillary refill takes less than 2 seconds  Findings: Lesion present  Neurological:      Mental Status: She is alert     Psychiatric:         Mood and Affect: Mood normal          Behavior: Behavior normal

## 2023-03-20 NOTE — PROGRESS NOTES
Incision and Drainage    Date/Time: 3/20/2023 4:38 PM  Performed by: James Canales MD  Authorized by: James Canales MD   Universal Protocol:  Consent: Verbal consent obtained  Risks and benefits: risks, benefits and alternatives were discussed  Consent given by: patient  Patient understanding: patient states understanding of the procedure being performed      Patient location:  Clinic  Location:     Type:  Abscess    Location:  Upper extremity    Upper extremity location:  R arm  Pre-procedure details:     Skin preparation:  Chloraprep  Anesthesia (see MAR for exact dosages): Anesthesia method:  Local infiltration    Local anesthetic:  Bupivacaine 0 25% w/o epi  Procedure details:     Complexity:  Simple    Needle aspiration: no      Incision types:  Cruciate    Scalpel blade:  11    Approach:  Open    Incision depth:  Subcutaneous    Wound management:  Probed and deloculated and irrigated with saline    Drainage:  Serosanguinous    Drainage amount:  Scant    Wound treatment:  Packing placed    Packing materials:  1/4 in gauze  Post-procedure details:     Patient tolerance of procedure:   Tolerated well, no immediate complications

## 2023-04-25 DIAGNOSIS — F31.61 BIPOLAR DISORDER, CURRENT EPISODE MIXED, MILD (HCC): ICD-10-CM

## 2023-04-25 RX ORDER — LAMOTRIGINE 100 MG/1
TABLET ORAL
Qty: 45 TABLET | Refills: 1 | Status: SHIPPED | OUTPATIENT
Start: 2023-04-25

## 2023-09-26 ENCOUNTER — OFFICE VISIT (OUTPATIENT)
Dept: URGENT CARE | Facility: CLINIC | Age: 33
End: 2023-09-26
Payer: MEDICARE

## 2023-09-26 VITALS
OXYGEN SATURATION: 98 % | HEART RATE: 96 BPM | DIASTOLIC BLOOD PRESSURE: 71 MMHG | HEIGHT: 61 IN | SYSTOLIC BLOOD PRESSURE: 104 MMHG | RESPIRATION RATE: 18 BRPM | WEIGHT: 214 LBS | BODY MASS INDEX: 40.4 KG/M2 | TEMPERATURE: 98 F

## 2023-09-26 DIAGNOSIS — L73.2 HIDRADENITIS SUPPURATIVA: Primary | ICD-10-CM

## 2023-09-26 PROCEDURE — 99203 OFFICE O/P NEW LOW 30 MIN: CPT

## 2023-09-26 RX ORDER — CEPHALEXIN 500 MG/1
500 CAPSULE ORAL EVERY 12 HOURS SCHEDULED
Qty: 14 CAPSULE | Refills: 0 | Status: SHIPPED | OUTPATIENT
Start: 2023-09-26 | End: 2023-10-03

## 2023-09-26 NOTE — PROGRESS NOTES
[facility]        NAME: Kt Washburn is a 29 y.o. female  : 1990    MRN: [id number]  DATE: 2023  TIME: 4:48 PM    Assessment and Plan   Hidradenitis suppurativa [L73.2]  1. Hidradenitis suppurativa  cephalexin (KEFLEX) 500 mg capsule        Pt pregnant with twins. Will use keflex for infected hidradenitis suppurativa. Pt has appt with ob/gyn tomorrow. Patient Instructions       Follow up with PCP in 3-5 days. Proceed to  ER if symptoms worsen. Chief Complaint   No chief complaint on file. History of Present Illness       Patient is a 27-year-old female presents the office today for a lump in her left axilla. Patient states that she was diagnosed with hidradenitis suppurativa in the past saw dermatology and they told her that they would like to do a skin graft in which she has not had completed. She states that she had chronic drainage of both of her armpits since being diagnosed with this in the past.  Currently she is pregnant with twins. She does not wear a bra she does not use deodorant she has been taking warm baths with Epsom salt. States that she has also been using alcohol and hydrogen peroxide on her underarms to help with the odor. Review of Systems   Review of Systems   Constitutional: Negative for chills and fever. Skin: Positive for wound. All other systems reviewed and are negative.         Current Medications       Current Outpatient Medications:   •  cephalexin (KEFLEX) 500 mg capsule, Take 1 capsule (500 mg total) by mouth every 12 (twelve) hours for 7 days, Disp: 14 capsule, Rfl: 0  •  lamoTRIgine (LaMICtal) 100 mg tablet, 1/2 tab po daily for 2 weeks, then 1 tab daily for 1 week, then 1.5 tablets daily, Disp: 45 tablet, Rfl: 1  •  albuterol (PROVENTIL HFA,VENTOLIN HFA) 90 mcg/act inhaler, Inhale 2 puffs every 6 (six) hours as needed for wheezing or shortness of breath, Disp: 18 g, Rfl: 1  •  EPINEPHrine (EPIPEN) 0.3 mg/0.3 mL SOAJ, Inject 0.3 mL (0.3 mg total) into a muscle once for 1 dose, Disp: 0.6 mL, Rfl: 1  •  Prenatal Vit-Fe Fumarate-FA (PRENATAL PO), Take by mouth (Patient not taking: Reported on 3/2/2023), Disp: , Rfl:     Current Allergies     Allergies as of 09/26/2023 - Reviewed 09/26/2023   Allergen Reaction Noted   • Bee venom  07/27/2019   • Bupropion Other (See Comments) 08/19/2018            The following portions of the patient's history were reviewed and updated as appropriate: allergies, current medications, past family history, past medical history, past social history, past surgical history and problem list.     Past Medical History:   Diagnosis Date   • Asthma    • Bipolar 1 disorder ([address])    • Closed right radial fracture    • High-risk pregnancy    • PTSD (post-traumatic stress disorder)        Past Surgical History:   Procedure Laterality Date   • COLONOSCOPY     • MOUTH SURGERY     • WISDOM TOOTH EXTRACTION         Family History   Problem Relation Age of Onset   • Diabetes Mother    • Heart attack Mother    • Heart disease Mother    • Hypertension Mother    • Mental illness Father    • Diabetes Maternal Grandmother    • Hypertension Maternal Grandmother    • Cancer Paternal Grandmother    • Diabetes Maternal Uncle    • Heart disease Maternal Uncle          Medications have been verified. Objective   /71   Pulse 96   Temp 98 °F (36.7 °C)   Resp 18   Ht 5' 1" (1.549 m)   Wt 97.1 kg (214 lb)   SpO2 98%   BMI 40.43 kg/m²        Physical Exam     Physical Exam  Vitals and nursing note reviewed. Constitutional:       Appearance: Normal appearance. She is normal weight. Cardiovascular:      Rate and Rhythm: Normal rate and regular rhythm. Pulses: Normal pulses. Heart sounds: Normal heart sounds. Pulmonary:      Effort: Pulmonary effort is normal.      Breath sounds: Normal breath sounds. Abdominal:      Comments: Gravid   Skin:     General: Skin is warm.       Capillary Refill: Capillary refill takes less than 2 seconds. Findings: Lesion present. Comments: Multiple pustular abscess in left and right axilla draining purulent drainage. Neurological:      General: No focal deficit present. Mental Status: She is alert.

## 2023-09-26 NOTE — PATIENT INSTRUCTIONS
Warm compresses to arm pits to help drainage. Take antibiotic  Call ob/gyn in regard to use of hibiclens with pregnancy especially with high risk pregnancy.

## 2023-09-28 ENCOUNTER — TELEMEDICINE (OUTPATIENT)
Dept: FAMILY MEDICINE CLINIC | Facility: CLINIC | Age: 33
End: 2023-09-28
Payer: MEDICARE

## 2023-09-28 DIAGNOSIS — U07.1 COVID-19 VIRUS INFECTION: Primary | ICD-10-CM

## 2023-09-28 PROCEDURE — 99213 OFFICE O/P EST LOW 20 MIN: CPT | Performed by: PHYSICIAN ASSISTANT

## 2023-09-28 PROCEDURE — 99203 OFFICE O/P NEW LOW 30 MIN: CPT | Performed by: PHYSICIAN ASSISTANT

## 2023-09-28 NOTE — PROGRESS NOTES
COVID-19 Outpatient Progress Note    Assessment/Plan:     Problem List Items Addressed This Visit    None  Visit Diagnoses     COVID-19 virus infection    -  Primary    reviewed supportive care  pt currently 24w pregnant - recommend infusion which she declined  FUP or report to ER for worsening symptoms. Disposition:     Discussed symptom directed medication options with patient. Discussed vitamin D, vitamin C, and/or zinc supplementation with patient. I have spent a total time of 15 minutes on the day of the encounter for this patient including discussing diagnostic results, discussing prognosis, risks and benefits of treatment options, instructions for management, patient and family education, impressions, documenting in the medical record and obtaining or reviewing history. Encounter provider: Turner Renee PA-C     Provider located at: [address] [facility] [address]     Recent Visits  No visits were found meeting these conditions. Showing recent visits within past 7 days and meeting all other requirements  Today's Visits  Date Type Provider Dept   09/28/23 Telemedicine Marisol Solis PA-C [address]  today's visits and meeting all other requirements  Future Appointments  No visits were found meeting these conditions. Showing future appointments within next 150 days and meeting all other requirements     This virtual check-in was done via [address] [place] [address] and patient was informed that this is a secure, HIPAA-compliant platform. She agrees to proceed. Patient agrees to participate in a virtual check in via telephone or video visit instead of presenting to the office to address urgent/immediate medical needs. Patient is aware this is a billable service. She acknowledged consent and understanding of privacy and security of the video platform.  The patient has agreed to participate and understands they can discontinue the visit at any time. After connecting through [facility], the patient was identified by name and date of birth. García Lorenzo was informed that this was a telemedicine visit and that the exam was being conducted confidentially over secure lines. My office door was closed. No one else was in the room. García Lorenzo acknowledged consent and understanding of privacy and security of the telemedicine visit. I informed the patient that I have reviewed her record in Epic and presented the opportunity for her to ask any questions regarding the visit today. The patient agreed to participate. Verification of patient location:  Patient is located in the following state in which I hold an active license: [place]    Subjective:   García Lorenzo is a 29 y.o. female who is concerned about COVID-19. Patient's symptoms include fatigue, malaise, nasal congestion, rhinorrhea, sore throat, cough and headache.  Patient denies fever, chills, shortness of breath and chest tightness.     - Date of symptom onset: 9/28/2023  - Date of exposure: 9/26/2023      COVID-19 vaccination status: Not vaccinated    Exposure:   Contact with a person who is under investigation (PUI) for or who is positive for COVID-19 within the last 14 days?: Yes    Hospitalized recently for fever and/or lower respiratory symptoms?: No      Currently a healthcare worker that is involved in direct patient care?: No      Works in a special setting where the risk of COVID-19 transmission may be high? (this may include long-term care, correctional and prison facilities; homeless shelters; assisted-living facilities and group homes.): No      Resident in a special setting where the risk of COVID-19 transmission may be high? (this may include long-term care, correctional and prison facilities; homeless shelters; assisted-living facilities and group homes.): No      24 weeks 5 days pregnant with twins   Discussed remdesivir given pregnancy and nonvaccinated status - pt declines     Lab Results   Component Value Date    SARSCOV2 Positive (A) 01/24/2022    SARSCOV2 Positive (A) 01/27/2021       Review of Systems   Constitutional: Positive for activity change, appetite change and fatigue. Negative for chills and fever. HENT: Positive for congestion, rhinorrhea and sore throat. Respiratory: Positive for cough. Negative for chest tightness and shortness of breath. Cardiovascular: Negative for chest pain. Gastrointestinal: Negative. Neurological: Positive for headaches. Current Outpatient Medications on File Prior to Visit   Medication Sig   • lamoTRIgine (LaMICtal) 100 mg tablet 1/2 tab po daily for 2 weeks, then 1 tab daily for 1 week, then 1.5 tablets daily   • albuterol (PROVENTIL HFA,VENTOLIN HFA) 90 mcg/act inhaler Inhale 2 puffs every 6 (six) hours as needed for wheezing or shortness of breath   • cephalexin (KEFLEX) 500 mg capsule Take 1 capsule (500 mg total) by mouth every 12 (twelve) hours for 7 days   • EPINEPHrine (EPIPEN) 0.3 mg/0.3 mL SOAJ Inject 0.3 mL (0.3 mg total) into a muscle once for 1 dose   • Prenatal Vit-Fe Fumarate-FA (PRENATAL PO) Take by mouth (Patient not taking: Reported on 3/2/2023)       Objective: There were no vitals taken for this visit. Physical Exam  Constitutional:       General: She is not in acute distress. Appearance: Normal appearance. She is obese. She is ill-appearing. Comments: Lying in bed   HENT:      Head: Normocephalic and atraumatic. Pulmonary:      Effort: Pulmonary effort is normal.   Neurological:      Mental Status: She is alert and oriented to person, place, and time. Mental status is at baseline. Psychiatric:         Mood and Affect: Mood normal.         Behavior: Behavior normal.         Thought Content:  Thought content normal.         Judgment: Judgment normal.       Marisol Solis PA-C

## 2023-10-03 ENCOUNTER — TELEMEDICINE (OUTPATIENT)
Dept: FAMILY MEDICINE CLINIC | Facility: CLINIC | Age: 33
End: 2023-10-03
Payer: MEDICARE

## 2023-10-03 DIAGNOSIS — U07.1 COVID-19 VIRUS INFECTION: Primary | ICD-10-CM

## 2023-10-03 DIAGNOSIS — Z3A.25 25 WEEKS GESTATION OF PREGNANCY: ICD-10-CM

## 2023-10-03 DIAGNOSIS — B37.31 VAGINA, CANDIDIASIS: ICD-10-CM

## 2023-10-03 PROCEDURE — 99213 OFFICE O/P EST LOW 20 MIN: CPT | Performed by: PHYSICIAN ASSISTANT

## 2023-10-03 RX ORDER — AMOXICILLIN AND CLAVULANATE POTASSIUM 875; 125 MG/1; MG/1
1 TABLET, FILM COATED ORAL EVERY 12 HOURS SCHEDULED
Qty: 10 TABLET | Refills: 0 | Status: SHIPPED | OUTPATIENT
Start: 2023-10-03 | End: 2023-10-08

## 2023-10-03 RX ORDER — AZITHROMYCIN 250 MG/1
TABLET, FILM COATED ORAL
Qty: 6 TABLET | Refills: 0 | Status: SHIPPED | OUTPATIENT
Start: 2023-10-03 | End: 2023-10-07

## 2023-10-03 RX ORDER — CLOTRIMAZOLE 1 %
1 CREAM WITH APPLICATOR VAGINAL
Qty: 45 G | Refills: 0 | Status: SHIPPED | OUTPATIENT
Start: 2023-10-03 | End: 2023-10-10

## 2023-10-03 NOTE — PROGRESS NOTES
COVID-19 Outpatient Progress Note    Assessment/Plan:    Problem List Items Addressed This Visit    None  Visit Diagnoses     COVID-19 virus infection    -  Primary    will treat for post covid pnemonia given pregnancy status and symptoms unimproved with conservative care   FUP 3 days or sooner if concerns arise    Relevant Medications    amoxicillin-clavulanate (AUGMENTIN) 875-125 mg per tablet    azithromycin (ZITHROMAX) 250 mg tablet    25 weeks gestation of pregnancy        Vagina, candidiasis        Relevant Medications    clotrimazole (GYNE-LOTRIMIN) 1 % vaginal cream         Disposition:     Patient with moderate or severe COVID-19. They should isolate from others through at least day 10. Isolation can be ended if symptoms are improving and they are fever free for the past 24 hours. If they still have fever or other symptoms have not improved, continue to isolate until they improve. Regardless of when you isolation is ended, avoid being around people who are more likely to get very sick from COVID-19 until at least day 11. Patient's with severe COVID-19 may need to isolate up to 20 days from symptom onset. Discussed symptom directed medication options with patient. Discussed vitamin D, vitamin C, and/or zinc supplementation with patient. I have spent a total time of 15 minutes on the day of the encounter for this patient including discussing prognosis, risks and benefits of treatment options, instructions for management, patient and family education, importance of treatment compliance, impressions, documenting in the medical record and obtaining or reviewing history.        Encounter provider: Eda Garcia PA-C     Provider located at: [address] [address]     Recent Visits  Date Type Provider Dept   09/28/23 Telemedicine Marisol Solis PA-C [facility]  recent visits within past 7 days and meeting all other requirements  Today's Visits  Date Type Provider Dept   10/03/23 Telemedicine Marisol Solis PA-C [facility]   Showing today's visits and meeting all other requirements  Future Appointments  No visits were found meeting these conditions. Showing future appointments within next 150 days and meeting all other requirements       Patient agrees to participate in a virtual check in via telephone or video visit instead of presenting to the office to address urgent/immediate medical needs. Patient is aware this is a billable service. She acknowledged consent and understanding of privacy and security of the video platform. The patient has agreed to participate and understands they can discontinue the visit at any time. After connecting through [facility], the patient was identified by name and date of birth. Luisito Salmeron was informed that this was a telemedicine visit and that the exam was being conducted confidentially over secure lines. My office door was closed. No one else was in the room. Luisito Salmeron acknowledged consent and understanding of privacy and security of the telemedicine visit. I informed the patient that I have reviewed her record in Epic and presented the opportunity for her to ask any questions regarding the visit today. The patient agreed to participate. Verification of patient location:  Patient is located in the following state in which I hold an active license: [place]    Subjective:   Luisito Salmeron is a 29 y.o. female who has been screened for COVID-19. Symptom change since last report: worsening. Patient's symptoms include nasal congestion, rhinorrhea, loss of taste, cough, shortness of breath, chest tightness and headache. Patient denies fever, chills, fatigue, malaise, sore throat, anosmia, abdominal pain, nausea, vomiting, diarrhea and myalgias. - Date of symptom onset: 9/28/2023  - Date of positive COVID-19 test: 9/28/2023.  Type of test: Home antigen. COVID-19 vaccination status: Not vaccinated    Brannon Stephens has been staying home and has isolated themselves in her home. She is taking care to not share personal items and is cleaning all surfaces that are touched often, like counters, tabletops, and doorknobs using household cleaning sprays or wipes. She is wearing a mask when she leaves her room. Feels more energized compared to prior however still having congestion, chest tightness and feeling winded   Eating and drinking well      Lab Results   Component Value Date    SARSCOV2 Positive (A) 01/24/2022    SARSCOV2 Positive (A) 01/27/2021       Review of Systems   Constitutional: Negative for chills, fatigue and fever. HENT: Positive for congestion and rhinorrhea. Negative for sore throat. Respiratory: Positive for cough, chest tightness and shortness of breath. Gastrointestinal: Negative for abdominal pain, diarrhea, nausea and vomiting. Musculoskeletal: Negative for myalgias. Neurological: Positive for headaches. Current Outpatient Medications on File Prior to Visit   Medication Sig   • lamoTRIgine (LaMICtal) 100 mg tablet 1/2 tab po daily for 2 weeks, then 1 tab daily for 1 week, then 1.5 tablets daily   • albuterol (PROVENTIL HFA,VENTOLIN HFA) 90 mcg/act inhaler Inhale 2 puffs every 6 (six) hours as needed for wheezing or shortness of breath   • cephalexin (KEFLEX) 500 mg capsule Take 1 capsule (500 mg total) by mouth every 12 (twelve) hours for 7 days   • EPINEPHrine (EPIPEN) 0.3 mg/0.3 mL SOAJ Inject 0.3 mL (0.3 mg total) into a muscle once for 1 dose   • Prenatal Vit-Fe Fumarate-FA (PRENATAL PO) Take by mouth (Patient not taking: Reported on 3/2/2023)       Objective: There were no vitals taken for this visit. Physical Exam  Constitutional:       Appearance: Normal appearance. She is ill-appearing. HENT:      Head: Normocephalic and atraumatic.    Pulmonary:      Effort: Pulmonary effort is normal.      Comments: Some coughing  No conversational dyspnea   Neurological:      Mental Status: She is alert. Psychiatric:         Mood and Affect: Mood normal.         Behavior: Behavior normal.         Thought Content:  Thought content normal.         Judgment: Judgment normal.       Marisol Solis PA-C

## 2023-10-06 ENCOUNTER — TELEMEDICINE (OUTPATIENT)
Dept: FAMILY MEDICINE CLINIC | Facility: CLINIC | Age: 33
End: 2023-10-06
Payer: MEDICARE

## 2023-10-06 DIAGNOSIS — U07.1 COVID-19 VIRUS INFECTION: Primary | ICD-10-CM

## 2023-10-06 PROCEDURE — 99213 OFFICE O/P EST LOW 20 MIN: CPT | Performed by: PHYSICIAN ASSISTANT

## 2023-10-06 NOTE — PROGRESS NOTES
COVID-19 Outpatient Progress Note    Assessment/Plan:    Problem List Items Addressed This Visit    None  Visit Diagnoses     COVID-19 virus infection    -  Primary    pt has not picked up abx from pharm - advised to start given concern for COVID pneum duing pregnancy  increase fluids  report to ER for SOB, fever, contractions         FUP 1 week    Disposition:     Patient with asymptomatic/mild COVID-19: They were recommended to isolate for at least 5 days (day 0 is the day symptoms appeared or the date the specimen was collected for the positive test for people who are asymptomatic). If they are asymptomatic or symptoms are improving with no fevers in the past 24 hours, isolation may be ended followed by 5 days of wearing a high quality mask when around others to minimize risk of infecting others. They should wear a mask through day 10 and a test-based strategy may be used to remove a mask sooner. I have spent a total time of 15 minutes on the day of the encounter for this patient including discussing prognosis, risks and benefits of treatment options, instructions for management, patient and family education, importance of treatment compliance, documenting in the medical record and obtaining or reviewing history. Encounter provider: Ozzie Dave PA-C     Provider located at: [address] [address]     Recent Visits  Date Type Provider Dept   10/03/23 Telemedicine Marisol Solis PA-C [facility] [address] recent visits within past 7 days and meeting all other requirements  Today's Visits  Date Type Provider Dept   10/06/23 Telemedicine Marisol Solis PA-C [facility] [address] today's visits and meeting all other requirements  Future Appointments  No visits were found meeting these conditions.   Showing future appointments within next 150 days and meeting all other requirements     This virtual check-in was done via [address] [place] [address] and patient was informed that this is a secure, HIPAA-compliant platform. She agrees to proceed. Patient agrees to participate in a virtual check in via telephone or video visit instead of presenting to the office to address urgent/immediate medical needs. Patient is aware this is a billable service. She acknowledged consent and understanding of privacy and security of the video platform. The patient has agreed to participate and understands they can discontinue the visit at any time. After connecting through [facility], the patient was identified by name and date of birth. García Lorenzo was informed that this was a telemedicine visit and that the exam was being conducted confidentially over secure lines. My office door was closed. No one else was in the room. García Lorenzo acknowledged consent and understanding of privacy and security of the telemedicine visit. I informed the patient that I have reviewed her record in Epic and presented the opportunity for her to ask any questions regarding the visit today. The patient agreed to participate. Verification of patient location:  Patient is located in the following state in which I hold an active license: [place]    Subjective:   García Lorenzo is a 29 y.o. female who has been screened for COVID-19. Symptom change since last report: improving. Patient's symptoms include fatigue, malaise, nasal congestion, rhinorrhea and cough. Patient denies fever, chills, shortness of breath and chest tightness. COVID-19 vaccination status: Not vaccinated    Steven Fothergill has been staying home and has isolated themselves in her home. She is taking care to not share personal items and is cleaning all surfaces that are touched often, like counters, tabletops, and doorknobs using household cleaning sprays or wipes. She is wearing a mask when she leaves her room.      Seen in ER 10/5 for uterine contractions - 26 weeks pregnant     Lab Results   Component Value Date    SARSCOV2 Positive (A) 01/24/2022    SARSCOV2 Positive (A) 01/27/2021       Review of Systems   Constitutional: Positive for fatigue. Negative for chills and fever. HENT: Positive for congestion and rhinorrhea. Respiratory: Positive for cough. Negative for chest tightness and shortness of breath. Current Outpatient Medications on File Prior to Visit   Medication Sig   • lamoTRIgine (LaMICtal) 100 mg tablet 1/2 tab po daily for 2 weeks, then 1 tab daily for 1 week, then 1.5 tablets daily   • albuterol (PROVENTIL HFA,VENTOLIN HFA) 90 mcg/act inhaler Inhale 2 puffs every 6 (six) hours as needed for wheezing or shortness of breath   • amoxicillin-clavulanate (AUGMENTIN) 875-125 mg per tablet Take 1 tablet by mouth every 12 (twelve) hours for 5 days   • azithromycin (ZITHROMAX) 250 mg tablet Take 2 tabs (500mg) on day 1 followed by 1 tab (250mg) daily x4 days. • clotrimazole (GYNE-LOTRIMIN) 1 % vaginal cream Insert 1 applicator into the vagina daily at bedtime for 7 days   • EPINEPHrine (EPIPEN) 0.3 mg/0.3 mL SOAJ Inject 0.3 mL (0.3 mg total) into a muscle once for 1 dose   • Prenatal Vit-Fe Fumarate-FA (PRENATAL PO) Take by mouth (Patient not taking: Reported on 3/2/2023)       Objective: There were no vitals taken for this visit. Physical Exam  Constitutional:       Appearance: Normal appearance. She is not ill-appearing or toxic-appearing. Comments: Lying in bed   Pulmonary:      Effort: Pulmonary effort is normal.   Neurological:      Mental Status: She is alert. Psychiatric:         Mood and Affect: Mood normal.         Behavior: Behavior normal.         Thought Content:  Thought content normal.         Judgment: Judgment normal.       Marisol Solis PA-C

## 2023-10-13 ENCOUNTER — TELEMEDICINE (OUTPATIENT)
Dept: FAMILY MEDICINE CLINIC | Facility: CLINIC | Age: 33
End: 2023-10-13
Payer: MEDICARE

## 2023-10-13 DIAGNOSIS — U07.1 COVID-19 VIRUS INFECTION: Primary | ICD-10-CM

## 2023-10-13 PROCEDURE — 99213 OFFICE O/P EST LOW 20 MIN: CPT | Performed by: PHYSICIAN ASSISTANT

## 2023-10-13 NOTE — PROGRESS NOTES
COVID-19 Outpatient Progress Note    Assessment/Plan:    Problem List Items Addressed This Visit    None  Visit Diagnoses       COVID-19 virus infection    -  Primary    symptoms resolved           Disposition:     Patient with asymptomatic/mild COVID-19: They were recommended to isolate for at least 5 days (day 0 is the day symptoms appeared or the date the specimen was collected for the positive test for people who are asymptomatic). If they are asymptomatic or symptoms are improving with no fevers in the past 24 hours, isolation may be ended followed by 5 days of wearing a high quality mask when around others to minimize risk of infecting others. They should wear a mask through day 10 and a test-based strategy may be used to remove a mask sooner. I have spent a total time of 10 minutes on the day of the encounter for this patient including instructions for management, patient and family education and documenting in the medical record. Encounter provider: Robert Duke PA-C     Provider located at: [place] [phone]     Recent Visits  Date Type Provider Dept   10/06/23 Telemedicine Marisol Solis PA-C [facility] [address] recent visits within past 7 days and meeting all other requirements  Today's Visits  Date Type Provider Dept   10/13/23 Telemedicine Marisol Solis PA-C [facility] [address] today's visits and meeting all other requirements  Future Appointments  No visits were found meeting these conditions. Showing future appointments within next 150 days and meeting all other requirements     This virtual check-in was done via [address] [place] [address] and patient was informed that this is a secure, HIPAA-compliant platform. She agrees to proceed.     Patient agrees to participate in a virtual check in via telephone or video visit instead of presenting to the office to address urgent/immediate medical needs. Patient is aware this is a billable service. She acknowledged consent and understanding of privacy and security of the video platform. The patient has agreed to participate and understands they can discontinue the visit at any time. After connecting through [facility], the patient was identified by name and date of birth. Ilya Sow was informed that this was a telemedicine visit and that the exam was being conducted confidentially over secure lines. My office door was closed. No one else was in the room. Ilya Sow acknowledged consent and understanding of privacy and security of the telemedicine visit. I informed the patient that I have reviewed her record in Epic and presented the opportunity for her to ask any questions regarding the visit today. The patient agreed to participate. Verification of patient location:  Patient is located in the following state in which I hold an active license: [place]    Subjective:   Ilya Sow is a 29 y.o. female who has been screened for COVID-19. Symptom change since last report: resolving. Patient is currently asymptomatic. Patient denies fever, chills, fatigue, malaise, congestion, rhinorrhea, sore throat, anosmia, loss of taste, cough, shortness of breath, chest tightness, abdominal pain, nausea, vomiting, diarrhea, myalgias and headaches. - Date of symptom onset: 9/28/2023  - Date of exposure: 9/26/2023      COVID-19 vaccination status: Not vaccinated    Kristin Oro has been staying home and has isolated themselves in her home. She is taking care to not share personal items and is cleaning all surfaces that are touched often, like counters, tabletops, and doorknobs using household cleaning sprays or wipes. She is wearing a mask when she leaves her room.      Some nasal dryness     Lab Results   Component Value Date    SARSCOV2 Positive (A) 01/24/2022    SARSCOV2 Positive (A) 01/27/2021       Review of Systems   Constitutional: Negative for chills, fatigue and fever. HENT:  Negative for congestion, rhinorrhea and sore throat. Respiratory:  Negative for cough, chest tightness and shortness of breath. Gastrointestinal:  Negative for abdominal pain, diarrhea, nausea and vomiting. Musculoskeletal:  Negative for myalgias. Neurological:  Negative for headaches. Current Outpatient Medications on File Prior to Visit   Medication Sig    lamoTRIgine (LaMICtal) 100 mg tablet 1/2 tab po daily for 2 weeks, then 1 tab daily for 1 week, then 1.5 tablets daily    albuterol (PROVENTIL HFA,VENTOLIN HFA) 90 mcg/act inhaler Inhale 2 puffs every 6 (six) hours as needed for wheezing or shortness of breath    EPINEPHrine (EPIPEN) 0.3 mg/0.3 mL SOAJ Inject 0.3 mL (0.3 mg total) into a muscle once for 1 dose    Prenatal Vit-Fe Fumarate-FA (PRENATAL PO) Take by mouth (Patient not taking: Reported on 3/2/2023)       Objective: There were no vitals taken for this visit. Physical Exam  Constitutional:       Appearance: Normal appearance. Pulmonary:      Effort: Pulmonary effort is normal.   Neurological:      Mental Status: She is alert. Psychiatric:         Mood and Affect: Mood normal.         Behavior: Behavior normal.         Thought Content:  Thought content normal.         Judgment: Judgment normal.       Marisol Solis PA-C

## 2023-10-16 ENCOUNTER — OFFICE VISIT (OUTPATIENT)
Dept: SURGERY | Facility: CLINIC | Age: 33
End: 2023-10-16
Payer: MEDICARE

## 2023-10-16 ENCOUNTER — APPOINTMENT (OUTPATIENT)
Dept: LAB | Facility: HOSPITAL | Age: 33
End: 2023-10-16
Attending: SURGERY
Payer: MEDICARE

## 2023-10-16 VITALS
SYSTOLIC BLOOD PRESSURE: 118 MMHG | BODY MASS INDEX: 40.3 KG/M2 | HEART RATE: 67 BPM | HEIGHT: 62 IN | OXYGEN SATURATION: 97 % | WEIGHT: 219 LBS | TEMPERATURE: 97.9 F | DIASTOLIC BLOOD PRESSURE: 72 MMHG

## 2023-10-16 DIAGNOSIS — L02.91 ABSCESS: ICD-10-CM

## 2023-10-16 DIAGNOSIS — L73.2 HIDRADENITIS SUPPURATIVA: ICD-10-CM

## 2023-10-16 DIAGNOSIS — L73.2 HIDRADENITIS SUPPURATIVA: Primary | ICD-10-CM

## 2023-10-16 PROCEDURE — 87076 CULTURE ANAEROBE IDENT EACH: CPT

## 2023-10-16 PROCEDURE — 87205 SMEAR GRAM STAIN: CPT

## 2023-10-16 PROCEDURE — 87077 CULTURE AEROBIC IDENTIFY: CPT

## 2023-10-16 PROCEDURE — 99213 OFFICE O/P EST LOW 20 MIN: CPT | Performed by: SURGERY

## 2023-10-16 PROCEDURE — 87070 CULTURE OTHR SPECIMN AEROBIC: CPT

## 2023-10-16 PROCEDURE — 10060 I&D ABSCESS SIMPLE/SINGLE: CPT | Performed by: SURGERY

## 2023-10-16 RX ORDER — FERROUS SULFATE 325(65) MG
TABLET ORAL
COMMUNITY
Start: 2023-10-16

## 2023-10-16 RX ORDER — ASPIRIN 81 MG
TABLET,CHEWABLE ORAL
COMMUNITY

## 2023-10-16 RX ORDER — FAMOTIDINE 10 MG
10 TABLET ORAL 2 TIMES DAILY
COMMUNITY

## 2023-10-16 RX ORDER — LANOLIN ALCOHOL/MO/W.PET/CERES
400 CREAM (GRAM) TOPICAL DAILY
COMMUNITY
Start: 2023-07-12

## 2023-10-16 RX ORDER — ACETAMINOPHEN 160 MG
TABLET,DISINTEGRATING ORAL
COMMUNITY
Start: 2023-10-16

## 2023-10-16 NOTE — PROGRESS NOTES
Assessment/Plan:    Hidradenitis suppurativa  Recurrent flare of her hidradenitis suppurativa. She this is recurrent and both bilateral axilla. Today she has a large approximately 5 cm abscess in the left axilla. This underwent incision and drainage. Significant purulent material noted. Cultures taken. No evidence of any surrounding erythema to suggest cellulitis. The abscess cavity was packed. Is to be removed in 24 hours. Follow-up 1 week for wound check. Abscess  Large abscess approximately 5 cm of the left axilla associated hidradenitis suppurativa. Incision and drainage undertaken in the office today. Patient tolerated procedure well. Significant amount of purulent material noted. Cavity was washed out. Cultures taken. No associated cellulitis. No antibiotics will be started for now will await cultures. Packing be removed in 24 hours. Warm compresses 3 times a day. Follow-up 1 week for wound check. Diagnoses and all orders for this visit:    Hidradenitis suppurativa  -     Wound culture and Gram stain; Future    Abscess of left axilla    Abscess    Other orders  -     magnesium Oxide (MAG-OX) 400 mg TABS; Take 400 mg by mouth daily  -     FeroSul 325 (65 Fe) MG tablet  -     famotidine (PEPCID) 10 mg tablet; Take 10 mg by mouth 2 (two) times a day  -     Cholecalciferol (Vitamin D3) 50 MCG (2000 UT) capsule  -     Aspirin Low Dose 81 MG chewable tablet; chew and swallow 2 tablets by mouth once daily          Subjective:      Patient ID: Flor Villarreal is a 29 y.o. female. 82-year-old female with known asthma, bipolar, hidradenitis suppurativa, PTSD, presents today in follow-up for in follow-up for what appears to be recurrent abscess related to hidradenitis. Patient states the past week or 2 she had some significant swelling in the left axilla. She said there is a small abscess that she knew was there however this popped on its own and started to drain.   However just below that a much larger fluid-filled collection was noted. Definitely tender. No overlying redness as per patient. No fevers or chills. No other associate symptoms at this time. Patient does state that she had previous abscesses in the axilla which left definitively healed. She has not seen dermatology recently. Currently on not any long-term therapy for the hidradenitis. She does avoid deodorant. She keeps the areas clean and dry. The following portions of the patient's history were reviewed and updated as appropriate: She  has a past medical history of Asthma, Bipolar 1 disorder ([address]), Closed right radial fracture, High-risk pregnancy, and PTSD (post-traumatic stress disorder). She   Patient Active Problem List    Diagnosis Date Noted    Hidradenitis suppurativa 12/21/2021    Asthma 08/17/2021    Enlarged liver 10/27/2020    Abscess 12/18/2019    Bipolar disorder, current episode mixed, mild ([address]) 11/11/2019    Bee sting allergy 11/11/2019     She  has a past surgical history that includes Mouth surgery; Colonoscopy; and [place] tooth extraction. Her family history includes Cancer in her paternal grandmother; Diabetes in her maternal grandmother, maternal uncle, and mother; Heart attack in her mother; Heart disease in her maternal uncle and mother; Hypertension in her maternal grandmother and mother; Mental illness in her father. She  reports that she has quit smoking. She has never used smokeless tobacco. She reports that she does not currently use alcohol. She reports that she does not use drugs.   Current Outpatient Medications   Medication Sig Dispense Refill    albuterol (PROVENTIL HFA,VENTOLIN HFA) 90 mcg/act inhaler Inhale 2 puffs every 6 (six) hours as needed for wheezing or shortness of breath 18 g 1    Aspirin Low Dose 81 MG chewable tablet chew and swallow 2 tablets by mouth once daily      Cholecalciferol (Vitamin D3) 50 MCG (2000 UT) capsule       famotidine (PEPCID) 10 mg tablet Take 10 mg by mouth 2 (two) times a day      FeroSul 325 (65 Fe) MG tablet       lamoTRIgine (LaMICtal) 100 mg tablet 1/2 tab po daily for 2 weeks, then 1 tab daily for 1 week, then 1.5 tablets daily 45 tablet 1    magnesium Oxide (MAG-OX) 400 mg TABS Take 400 mg by mouth daily      EPINEPHrine (EPIPEN) 0.3 mg/0.3 mL SOAJ Inject 0.3 mL (0.3 mg total) into a muscle once for 1 dose 0.6 mL 1    Prenatal Vit-Fe Fumarate-FA (PRENATAL PO) Take by mouth (Patient not taking: Reported on 10/16/2023)       No current facility-administered medications for this visit. She is allergic to bee venom and bupropion. .    Review of Systems      Review of systems completed, all negative except as noted by HPI. Objective:      /72 (BP Location: Left arm, Patient Position: Sitting, Cuff Size: Standard)   Pulse 67   Temp 97.9 °F (36.6 °C) (Temporal)   Ht 5' 2" (1.575 m)   Wt 99.3 kg (219 lb)   SpO2 97%   BMI 40.06 kg/m²          Physical Exam  Vitals reviewed. Constitutional:       General: She is not in acute distress. Appearance: Normal appearance. She is not ill-appearing. HENT:      Head: Normocephalic and atraumatic. Right Ear: External ear normal.      Left Ear: External ear normal.   Eyes:      General: No scleral icterus. Right eye: No discharge. Left eye: No discharge. Cardiovascular:      Rate and Rhythm: Normal rate and regular rhythm. Heart sounds: Normal heart sounds. No friction rub. No gallop. Pulmonary:      Effort: Pulmonary effort is normal. No respiratory distress. Breath sounds: Normal breath sounds. No stridor. No wheezing or rhonchi. Abdominal:      General: There is no distension. Palpations: Abdomen is soft. Tenderness: There is no abdominal tenderness. Musculoskeletal:         General: No swelling. Normal range of motion. Cervical back: Normal range of motion. Right lower leg: No edema. Left lower leg: No edema.    Skin: General: Skin is warm. Coloration: Skin is not jaundiced or pale. Findings: No bruising or erythema. Comments: Large 5 cm fluctuant abscess of the left axilla. There is also a small 1 cm abscess just inferior and superior to this. All 3 abscesses underwent incision and drainage. The more superior and larger abscess were both packed with half-inch packing   Neurological:      General: No focal deficit present. Mental Status: She is alert and oriented to person, place, and time. Cranial Nerves: No cranial nerve deficit. Psychiatric:         Mood and Affect: Mood normal.         Behavior: Behavior normal.         Thought Content: Thought content normal.         Judgment: Judgment normal.           Incision and Drainage    Date/Time: 10/16/2023 1:30 PM    Performed by: Juanjose Olivier DO  Authorized by: Juanjose Olivier DO  Universal Protocol:  Consent: Verbal consent obtained. Risks and benefits: risks, benefits and alternatives were discussed  Consent given by: patient  Time out: Immediately prior to procedure a "time out" was called to verify the correct patient, procedure, equipment, support staff and site/side marked as required. Patient understanding: patient states understanding of the procedure being performed  Patient consent: the patient's understanding of the procedure matches consent given  Patient identity confirmed: verbally with patient    Patient location:  Clinic  Location:     Type:  Abscess  Pre-procedure details:     Skin preparation:  Antiseptic wash  Anesthesia (see MAR for exact dosages): Anesthesia method:  Local infiltration    Local anesthetic:  Lidocaine 1% WITH epi  Procedure details:     Complexity:  Simple    Incision types:  Cruciate    Scalpel blade:  11    Approach:  Open    Incision depth:  Subcutaneous    Drainage:  Purulent    Drainage amount:   Moderate    Wound treatment:  Wound left open    Packing materials:  1/2 in gauze  Post-procedure details: Patient tolerance of procedure:   Tolerated well, no immediate complications

## 2023-10-16 NOTE — ASSESSMENT & PLAN NOTE
Large abscess approximately 5 cm of the left axilla associated hidradenitis suppurativa. Incision and drainage undertaken in the office today. Patient tolerated procedure well. Significant amount of purulent material noted. Cavity was washed out. Cultures taken. No associated cellulitis. No antibiotics will be started for now will await cultures. Packing be removed in 24 hours. Warm compresses 3 times a day. Follow-up 1 week for wound check.

## 2023-10-16 NOTE — ASSESSMENT & PLAN NOTE
Recurrent flare of her hidradenitis suppurativa. She this is recurrent and both bilateral axilla. Today she has a large approximately 5 cm abscess in the left axilla. This underwent incision and drainage. Significant purulent material noted. Cultures taken. No evidence of any surrounding erythema to suggest cellulitis. The abscess cavity was packed. Is to be removed in 24 hours. Follow-up 1 week for wound check.

## 2023-10-25 ENCOUNTER — TELEPHONE (OUTPATIENT)
Dept: SURGERY | Facility: CLINIC | Age: 33
End: 2023-10-25

## 2023-10-25 NOTE — TELEPHONE ENCOUNTER
Kendrick Galarza is with her son who is hospitalized. He was transferred to [facility] and she has no transportation back. She is not sure how long until  he is discharged.  She will call to  [facility]

## 2023-11-27 ENCOUNTER — OFFICE VISIT (OUTPATIENT)
Dept: FAMILY MEDICINE CLINIC | Facility: CLINIC | Age: 33
End: 2023-11-27
Payer: MEDICARE

## 2023-11-27 VITALS
DIASTOLIC BLOOD PRESSURE: 78 MMHG | OXYGEN SATURATION: 98 % | HEIGHT: 62 IN | HEART RATE: 87 BPM | BODY MASS INDEX: 40.3 KG/M2 | SYSTOLIC BLOOD PRESSURE: 124 MMHG | WEIGHT: 219 LBS

## 2023-11-27 DIAGNOSIS — N89.8 VAGINAL DISCHARGE DURING PREGNANCY IN THIRD TRIMESTER: ICD-10-CM

## 2023-11-27 DIAGNOSIS — O26.893 VAGINAL DISCHARGE DURING PREGNANCY IN THIRD TRIMESTER: ICD-10-CM

## 2023-11-27 DIAGNOSIS — H66.003 NON-RECURRENT ACUTE SUPPURATIVE OTITIS MEDIA OF BOTH EARS WITHOUT SPONTANEOUS RUPTURE OF TYMPANIC MEMBRANES: Primary | ICD-10-CM

## 2023-11-27 PROBLEM — O30.049 DICHORIONIC DIAMNIOTIC TWIN PREGNANCY: Status: ACTIVE | Noted: 2023-06-01

## 2023-11-27 PROBLEM — N76.0 BV (BACTERIAL VAGINOSIS): Status: ACTIVE | Noted: 2023-10-12

## 2023-11-27 PROBLEM — B37.9 CANDIDIASIS: Status: ACTIVE | Noted: 2023-10-12

## 2023-11-27 PROBLEM — B96.89 BV (BACTERIAL VAGINOSIS): Status: ACTIVE | Noted: 2023-10-12

## 2023-11-27 PROBLEM — R87.610 ASCUS OF CERVIX WITH NEGATIVE HIGH RISK HPV: Status: ACTIVE | Noted: 2023-06-01

## 2023-11-27 PROCEDURE — 99214 OFFICE O/P EST MOD 30 MIN: CPT | Performed by: PHYSICIAN ASSISTANT

## 2023-11-27 RX ORDER — AMOXICILLIN 500 MG/1
500 TABLET, FILM COATED ORAL 2 TIMES DAILY
Qty: 20 TABLET | Refills: 0 | Status: SHIPPED | OUTPATIENT
Start: 2023-11-27 | End: 2023-12-07

## 2023-11-27 NOTE — PROGRESS NOTES
Assessment/Plan:    No problem-specific Assessment & Plan notes found for this encounter. Diagnoses and all orders for this visit:    Non-recurrent acute suppurative otitis media of both ears without spontaneous rupture of tympanic membranes  -     amoxicillin (AMOXIL) 500 MG tablet; Take 1 tablet (500 mg total) by mouth 2 (two) times a day for 10 days    Vaginal discharge during pregnancy in third trimester  Comments:  spoke to [facility] triage which advised to monitor or if concerns to report to [facility] L&D for evaluation. relayed msg to pt & call their office w/concerns          Subjective:      Patient ID: Ilya Sow is a [address] y.o. female presents as same day for evaluation of congestion, eye discharge, eye pain ongoing for 10 days. Pt is 33 weeks pregnant with twin gestation. Her other 4 children are sick with similar symptoms. No fever or SOB>    She is concerned about vaginal fluid leakage yesterday around 2PM. It soaked her underwear. She smelt it and it did not smell like urine. None since. No bleeding but believes she lost her mucus plug. +fetal movement     HPI    The following portions of the patient's history were reviewed and updated as appropriate: allergies, current medications, past family history, past medical history, past social history, past surgical history, and problem list.    Review of Systems   Constitutional:  Negative for activity change, appetite change, chills, fatigue and fever. HENT:  Positive for congestion, postnasal drip, rhinorrhea, sinus pressure, sinus pain and sneezing. Eyes:  Positive for pain, discharge and itching. Gastrointestinal:  Negative for abdominal distention, diarrhea, nausea and vomiting. Genitourinary:  Positive for vaginal discharge. Neurological:  Positive for headaches.          Objective:      /78   Pulse 87   Ht 5' 2" (1.575 m)   Wt 99.3 kg (219 lb) Comment: per previous visit  SpO2 98%   BMI 40.06 kg/m²          Physical Exam  Constitutional:       General: She is not in acute distress. Appearance: Normal appearance. She is well-developed. She is not ill-appearing. HENT:      Head: Normocephalic and atraumatic. Right Ear: A middle ear effusion is present. Left Ear: A middle ear effusion is present. Nose: Congestion and rhinorrhea present. Mouth/Throat:      Pharynx: Posterior oropharyngeal erythema present. No oropharyngeal exudate. Eyes:      General: Lids are normal. Lids are everted, no foreign bodies appreciated. Conjunctiva/sclera: Conjunctivae normal.      Right eye: No exudate. Left eye: No exudate. Pupils: Pupils are equal, round, and reactive to light. Cardiovascular:      Rate and Rhythm: Normal rate and regular rhythm. Heart sounds: Normal heart sounds. No murmur heard. Pulmonary:      Effort: Pulmonary effort is normal. No respiratory distress. Breath sounds: Normal breath sounds. No wheezing. Abdominal:      General: Bowel sounds are normal.      Palpations: Abdomen is soft. Comments: gravid   Musculoskeletal:      Cervical back: No rigidity. Lymphadenopathy:      Cervical: No cervical adenopathy. Skin:     General: Skin is warm and dry. Capillary Refill: Capillary refill takes less than 2 seconds. Neurological:      Mental Status: She is alert and oriented to person, place, and time. Mental status is at baseline.    Psychiatric:         Behavior: Behavior normal.

## 2024-02-26 DIAGNOSIS — T63.441A BEE STING REACTION: ICD-10-CM

## 2024-02-26 RX ORDER — EPINEPHRINE 0.3 MG/.3ML
0.3 INJECTION SUBCUTANEOUS ONCE
Qty: 0.6 ML | Refills: 1 | Status: SHIPPED | OUTPATIENT
Start: 2024-02-26 | End: 2024-02-26

## 2024-02-27 ENCOUNTER — OFFICE VISIT (OUTPATIENT)
Dept: FAMILY MEDICINE CLINIC | Facility: CLINIC | Age: 34
End: 2024-02-27
Payer: MEDICARE

## 2024-02-27 VITALS
WEIGHT: 219 LBS | SYSTOLIC BLOOD PRESSURE: 128 MMHG | BODY MASS INDEX: 40.3 KG/M2 | OXYGEN SATURATION: 98 % | HEIGHT: 62 IN | HEART RATE: 91 BPM | DIASTOLIC BLOOD PRESSURE: 82 MMHG

## 2024-02-27 DIAGNOSIS — E07.9 THYROID DISEASE AFFECTING PREGNANCY: ICD-10-CM

## 2024-02-27 DIAGNOSIS — R06.81 WITNESSED EPISODE OF APNEA: ICD-10-CM

## 2024-02-27 DIAGNOSIS — M25.561 ACUTE PAIN OF BOTH KNEES: Primary | ICD-10-CM

## 2024-02-27 DIAGNOSIS — O99.280 THYROID DISEASE AFFECTING PREGNANCY: ICD-10-CM

## 2024-02-27 DIAGNOSIS — M25.562 ACUTE PAIN OF BOTH KNEES: Primary | ICD-10-CM

## 2024-02-27 DIAGNOSIS — R06.83 HABITUAL SNORING: ICD-10-CM

## 2024-02-27 DIAGNOSIS — E66.01 OBESITY, MORBID (HCC): ICD-10-CM

## 2024-02-27 PROCEDURE — 99214 OFFICE O/P EST MOD 30 MIN: CPT | Performed by: PHYSICIAN ASSISTANT

## 2024-02-27 RX ORDER — LAMOTRIGINE 25 MG/1
TABLET ORAL
COMMUNITY
Start: 2024-02-10

## 2024-02-27 NOTE — PROGRESS NOTES
Assessment/Plan:    No problem-specific Assessment & Plan notes found for this encounter.       Diagnoses and all orders for this visit:    Acute pain of both knees  Comments:  may try voltaren gel and/or tylenol/motrin  ice, rest, elevation  can wear compression brace during work   FUP if continues to worsen  Orders:  -     TSH, 3rd generation with Free T4 reflex; Future  -     Diclofenac Sodium (VOLTAREN) 1 %; Apply 2 g topically 4 (four) times a day  -     Elastic Bandages & Supports (Knee Compression Sleeve/L/XL) MISC; Use daily    Thyroid disease affecting pregnancy  -     TSH, 3rd generation with Free T4 reflex; Future    Witnessed episode of apnea  -     Ambulatory referral to Sleep Medicine; Future    Habitual snoring  -     Ambulatory referral to Sleep Medicine; Future    Obesity, morbid (HCC)  Comments:  encourage lifestyle modifications    Other orders  -     lamoTRIgine (LaMICtal) 25 mg tablet; take 1 tablet by mouth twice a day for 14 days        RTC 3 months or sooner if concerns arise    Subjective:      Patient ID: Rafaela Peres is a 33 y.o. female presents for complains of right knee pain.    It has been ongoing for about 3 days. She had bilateral pain but states the R > L. Pain is medial, deep throbbing. It is worse with walking down stairs or bending. She states she started a new job at [employer] 2 weeks ago and is on her feet all day and constantly bending and lifting. She has no numbness or tingling. Pain does not radiate. No bruising or swelling. She has not tried at OTC medications but has tried to elevate it.     She is concerned for STACEY. She states she has significant snoring and tiredness. She has 3m old twins but states these symptoms have been ongoing for several years. She has witnessed episodes of apea. She states she had an operation about 2 years ago and they suggested she get a sleep study. She has daytime sleepiness and frequent headaches.     Hx thyroid disorder in pregnancy.  "Per pt she did not have post partum lab work.     HPI    The following portions of the patient's history were reviewed and updated as appropriate: allergies, current medications, past family history, past medical history, past social history, past surgical history, and problem list.    Review of Systems   Constitutional:  Negative for activity change, appetite change and unexpected weight change.   HENT: Negative.     Respiratory: Negative.     Cardiovascular: Negative.    Gastrointestinal: Negative.    Musculoskeletal:  Positive for arthralgias and gait problem. Negative for joint swelling.   Psychiatric/Behavioral:  Positive for sleep disturbance.          Objective:      /82   Pulse 91   Ht 5' 2\" (1.575 m)   Wt 99.3 kg (219 lb) Comment: per previous visit  SpO2 98%   Breastfeeding Unknown   BMI 40.06 kg/m²          Physical Exam  Constitutional:       General: She is not in acute distress.     Appearance: Normal appearance. She is well-developed. She is obese.   HENT:      Head: Normocephalic and atraumatic.   Eyes:      Pupils: Pupils are equal, round, and reactive to light.   Cardiovascular:      Rate and Rhythm: Normal rate and regular rhythm.      Heart sounds: Normal heart sounds. No murmur heard.  Pulmonary:      Effort: Pulmonary effort is normal. No respiratory distress.      Breath sounds: Normal breath sounds. No wheezing.   Abdominal:      General: Bowel sounds are normal. There is no distension.      Palpations: Abdomen is soft.      Tenderness: There is no abdominal tenderness.   Musculoskeletal:      Right knee: No swelling, deformity, effusion, erythema or ecchymosis. Decreased range of motion. Tenderness present over the medial joint line. Normal alignment, normal meniscus and normal patellar mobility. Normal pulse.      Left knee: No swelling, deformity, effusion, erythema or ecchymosis. Normal range of motion. Tenderness present. Normal alignment, normal meniscus and normal patellar " mobility. Normal pulse.   Skin:     General: Skin is warm and dry.      Capillary Refill: Capillary refill takes less than 2 seconds.   Neurological:      Mental Status: She is alert and oriented to person, place, and time.   Psychiatric:         Mood and Affect: Mood normal.         Behavior: Behavior normal.         Thought Content: Thought content normal.         Judgment: Judgment normal.

## 2024-03-07 ENCOUNTER — TELEPHONE (OUTPATIENT)
Dept: SLEEP CENTER | Facility: CLINIC | Age: 34
End: 2024-03-07

## 2024-03-07 DIAGNOSIS — R06.83 SNORING: ICD-10-CM

## 2024-03-07 DIAGNOSIS — R06.81 WITNESSED EPISODE OF APNEA: ICD-10-CM

## 2024-03-07 DIAGNOSIS — G47.33 OSA (OBSTRUCTIVE SLEEP APNEA): Primary | ICD-10-CM

## 2024-03-07 NOTE — TELEPHONE ENCOUNTER
Received referral for sleep study. Patient's insurance does not cover home sleep studies. Order has been changed to Diagnostic sleep study. Ordering and co-signing providers notified.

## 2024-03-18 ENCOUNTER — OFFICE VISIT (OUTPATIENT)
Dept: URGENT CARE | Facility: CLINIC | Age: 34
End: 2024-03-18
Payer: MEDICARE

## 2024-03-18 ENCOUNTER — APPOINTMENT (OUTPATIENT)
Dept: RADIOLOGY | Facility: CLINIC | Age: 34
End: 2024-03-18
Payer: MEDICARE

## 2024-03-18 ENCOUNTER — APPOINTMENT (OUTPATIENT)
Dept: LAB | Facility: CLINIC | Age: 34
End: 2024-03-18
Payer: MEDICARE

## 2024-03-18 ENCOUNTER — TELEPHONE (OUTPATIENT)
Dept: FAMILY MEDICINE CLINIC | Facility: CLINIC | Age: 34
End: 2024-03-18

## 2024-03-18 VITALS
HEART RATE: 79 BPM | RESPIRATION RATE: 20 BRPM | WEIGHT: 210.6 LBS | OXYGEN SATURATION: 98 % | SYSTOLIC BLOOD PRESSURE: 110 MMHG | TEMPERATURE: 97.7 F | BODY MASS INDEX: 38.52 KG/M2 | DIASTOLIC BLOOD PRESSURE: 64 MMHG

## 2024-03-18 DIAGNOSIS — M25.561 ACUTE PAIN OF BOTH KNEES: Primary | ICD-10-CM

## 2024-03-18 DIAGNOSIS — M25.562 ACUTE PAIN OF BOTH KNEES: Primary | ICD-10-CM

## 2024-03-18 DIAGNOSIS — M25.562 ACUTE PAIN OF BOTH KNEES: ICD-10-CM

## 2024-03-18 DIAGNOSIS — S83.92XA SPRAIN OF LEFT KNEE, UNSPECIFIED LIGAMENT, INITIAL ENCOUNTER: Primary | ICD-10-CM

## 2024-03-18 DIAGNOSIS — E07.9 THYROID DISEASE AFFECTING PREGNANCY: ICD-10-CM

## 2024-03-18 DIAGNOSIS — M25.562 ACUTE PAIN OF LEFT KNEE: ICD-10-CM

## 2024-03-18 DIAGNOSIS — M25.561 ACUTE PAIN OF BOTH KNEES: ICD-10-CM

## 2024-03-18 DIAGNOSIS — O99.280 THYROID DISEASE AFFECTING PREGNANCY: ICD-10-CM

## 2024-03-18 PROCEDURE — 99213 OFFICE O/P EST LOW 20 MIN: CPT

## 2024-03-18 PROCEDURE — 36415 COLL VENOUS BLD VENIPUNCTURE: CPT

## 2024-03-18 PROCEDURE — 73562 X-RAY EXAM OF KNEE 3: CPT

## 2024-03-18 NOTE — TELEPHONE ENCOUNTER
Patient called looking to see where her knee support sleeve was sent to. I wasn't able to find anything in Epic, but I may have missed it if you can let me know and I can call her back thanks!

## 2024-03-18 NOTE — PROGRESS NOTES
"  [facility]        NAME: Rafaela Peres is a 33 y.o. female  : 1990    MRN: [id number]  DATE: 2024  TIME: 1:49 PM    Assessment and Plan   Sprain of left knee, unspecified ligament, initial encounter [S83.92XA]  1. Sprain of left knee, unspecified ligament, initial encounter  XR knee 3 vw left non injury        X-ray left knee negative for acute osseous abnormality.  Attempted to fit hinged knee brace on patient unable to do body habitus.  Ace wrap applied.    Patient Instructions   Rest, ice, compression.  If pain continues follow with PCP     Follow up with PCP in 3-5 days.  Proceed to  ER if symptoms worsen.    Chief Complaint     Chief Complaint   Patient presents with    Knee Pain     Left side         History of Present Illness       Patient is a 33 year old female who presents to the office today for left knee pain. States it has been ongoing for  \"a little while but worse yesterday\". Pain with stretching and bending. Has not taken anything for pain. Was unable to afford the voltaren gel but notes in the past it hadn't worked. Was unable to  a sleeve as well.     Knee Pain         Review of Systems   Review of Systems   Constitutional:  Negative for chills and fever.   Musculoskeletal:  Positive for arthralgias. Negative for joint swelling and myalgias.   All other systems reviewed and are negative.        Current Medications       Current Outpatient Medications:     albuterol (PROVENTIL HFA,VENTOLIN HFA) 90 mcg/act inhaler, Inhale 2 puffs every 6 (six) hours as needed for wheezing or shortness of breath, Disp: 18 g, Rfl: 1    Cholecalciferol (Vitamin D3) 50 MCG ( UT) capsule, , Disp: , Rfl:     Elastic Bandages & Supports (Knee Compression Sleeve/L/XL) MISC, Use daily, Disp: 1 each, Rfl: 0    famotidine (PEPCID) 10 mg tablet, Take 10 mg by mouth 2 (two) times a day, Disp: , Rfl:     lamoTRIgine (LaMICtal) 100 mg tablet, 1/2 tab po daily for 2 weeks, then 1 tab " daily for 1 week, then 1.5 tablets daily, Disp: 45 tablet, Rfl: 1    lamoTRIgine (LaMICtal) 25 mg tablet, 50 mg, Disp: , Rfl:     EPINEPHrine (EPIPEN) 0.3 mg/0.3 mL SOAJ, Inject 0.3 mL (0.3 mg total) into a muscle once for 1 dose, Disp: 0.6 mL, Rfl: 1    Current Allergies     Allergies as of 03/18/2024 - Reviewed 03/18/2024   Allergen Reaction Noted    Bee venom  07/27/2019    Bupropion Other (See Comments) 08/19/2018            The following portions of the patient's history were reviewed and updated as appropriate: allergies, current medications, past family history, past medical history, past social history, past surgical history and problem list.     Past Medical History:   Diagnosis Date    Asthma     Bipolar 1 disorder (HCC)     Closed right radial fracture     High-risk pregnancy     PTSD (post-traumatic stress disorder)        Past Surgical History:   Procedure Laterality Date    COLONOSCOPY      MOUTH SURGERY      WISDOM TOOTH EXTRACTION         Family History   Problem Relation Age of Onset    Diabetes Mother     Heart attack Mother     Heart disease Mother     Hypertension Mother     Mental illness Father     Diabetes Maternal Grandmother     Hypertension Maternal Grandmother     Cancer Paternal Grandmother     Diabetes Maternal Uncle     Heart disease Maternal Uncle          Medications have been verified.        Objective   /64   Pulse 79   Temp 97.7 °F (36.5 °C)   Resp 20   Wt 95.5 kg (210 lb 9.6 oz)   SpO2 98%   BMI 38.52 kg/m²        Physical Exam     Physical Exam  Vitals and nursing note reviewed.   Constitutional:       Appearance: Normal appearance. She is normal weight.   Cardiovascular:      Rate and Rhythm: Normal rate and regular rhythm.      Pulses: Normal pulses.      Heart sounds: Normal heart sounds.   Pulmonary:      Effort: Pulmonary effort is normal.      Breath sounds: Normal breath sounds.   Musculoskeletal:         General: No swelling, tenderness, deformity or signs of  injury.      Left knee: No bony tenderness or crepitus. No tenderness. No LCL laxity, MCL laxity, ACL laxity or PCL laxity.Normal alignment and normal meniscus.   Skin:     General: Skin is warm.      Capillary Refill: Capillary refill takes less than 2 seconds.   Neurological:      Mental Status: She is alert.

## 2024-03-19 ENCOUNTER — APPOINTMENT (OUTPATIENT)
Dept: LAB | Facility: CLINIC | Age: 34
End: 2024-03-19
Payer: MEDICARE

## 2024-03-19 DIAGNOSIS — M25.561 ACUTE PAIN OF BOTH KNEES: ICD-10-CM

## 2024-03-19 DIAGNOSIS — M25.562 ACUTE PAIN OF BOTH KNEES: ICD-10-CM

## 2024-03-19 DIAGNOSIS — E07.9 THYROID DISEASE AFFECTING PREGNANCY: ICD-10-CM

## 2024-03-19 DIAGNOSIS — O99.280 THYROID DISEASE AFFECTING PREGNANCY: ICD-10-CM

## 2024-03-19 LAB — TSH SERPL DL<=0.05 MIU/L-ACNC: 0.72 UIU/ML (ref 0.45–4.5)

## 2024-03-19 PROCEDURE — 36415 COLL VENOUS BLD VENIPUNCTURE: CPT

## 2024-03-19 PROCEDURE — 84443 ASSAY THYROID STIM HORMONE: CPT

## 2024-03-28 ENCOUNTER — TELEPHONE (OUTPATIENT)
Age: 34
End: 2024-03-28

## 2024-03-28 DIAGNOSIS — L73.2 HIDRADENITIS SUPPURATIVA: Primary | ICD-10-CM

## 2024-03-28 RX ORDER — OXYCODONE HYDROCHLORIDE 5 MG/1
5 TABLET ORAL EVERY 6 HOURS PRN
Qty: 15 TABLET | Refills: 0 | Status: SHIPPED | OUTPATIENT
Start: 2024-03-28

## 2024-03-28 RX ORDER — DOXYCYCLINE 100 MG/1
100 CAPSULE ORAL 2 TIMES DAILY
Qty: 28 CAPSULE | Refills: 0 | Status: SHIPPED | OUTPATIENT
Start: 2024-03-28 | End: 2024-04-11

## 2024-03-28 NOTE — TELEPHONE ENCOUNTER
Relayed message in detail to patient.  She verbalized understanding.  She is attempting to send picture through her mychart.

## 2024-03-28 NOTE — TELEPHONE ENCOUNTER
We do not have available office hours today or tomorrow. We can send in a course of antibiotics and pain medication with an outpatient appointment for next week or she can come to the ED to be evaluated today. Would be helpful if she sent in photos of all of the active sites so that we can review them as well.

## 2024-03-28 NOTE — TELEPHONE ENCOUNTER
Please recommend scheduled tylenol 975-1000mg every 8 hours with food, ibuprofen 600mg every 8 hours with food. PRN oxycodone (sent to pharmacy). Doxycycline twice a day. Warm compresses to any sites that are draining.

## 2024-03-28 NOTE — TELEPHONE ENCOUNTER
"Patient of Dr. Diaz. Last seen in the office on 10/16/23 for her hidradenitis suppurative. Has had several I&D's in the past. Calling today. Currently in a lot of pain, 10 plus out of 10. She states the open wounds at her axillas never closed. Now draining \"green\" and have a \"foul odor\". Crying on the phone. \"I need something for the pain\" Please advise.  "

## 2024-04-10 ENCOUNTER — OFFICE VISIT (OUTPATIENT)
Dept: SURGERY | Facility: CLINIC | Age: 34
End: 2024-04-10

## 2024-04-10 VITALS
DIASTOLIC BLOOD PRESSURE: 80 MMHG | BODY MASS INDEX: 38.64 KG/M2 | OXYGEN SATURATION: 97 % | TEMPERATURE: 97.9 F | WEIGHT: 210 LBS | HEIGHT: 62 IN | HEART RATE: 84 BPM | SYSTOLIC BLOOD PRESSURE: 128 MMHG

## 2024-04-10 DIAGNOSIS — L73.2 HIDRADENITIS SUPPURATIVA: Primary | ICD-10-CM

## 2024-04-13 NOTE — PROGRESS NOTES
Assessment/Plan:    Hidradenitis suppurativa  Recurrent flare of her hidradenitis suppurativa.  Once again this is within her bilateral axilla.  On exam there is expressive purulence but no need for incision and drainage.  She also has lesions within her groin near her pubic region in addition to underneath her breast.  Patient did see dermatology who just prescribed doxycycline.  At this point I do feel that she warrants further intervention with regards to medical therapy likely in the form of a biologic.  For now we will investigate other dermatologist to see if potential biologic therapy is an option.  Furthermore recommend the patient follow-up with her own dermatologist to see if this would be consideration.  Continue warm soaks bilateral axilla as needed.  Will follow-up as needed.     Diagnoses and all orders for this visit:    Hidradenitis suppurativa          Notes:    Recent telephone communications x 2 with covering surgeon Dr. Philip, dated March 28, 2024 both at 10:28 AM and 10:52 AM were personally reviewed by me.  Recent PCP note dated 3/27, 2024 was personally viewing.    Labs, blood work:    Recent blood work to include CMP ated March 5, 2024 was personally reviewed by me.      Subjective:      Patient ID: Rafaela Peres is a 33 y.o. female.    33-year-old female with known asthma, PTSD, hidradenitis suppurativa, presents today in follow-up regarding recurrent flare of this hidradenitis.  Patient has known hidradenitis with multiple abscesses in bilateral [place].  She now states she has evidence of hidradenitis beneath her breast in addition to the groin area.  She did recently see dermatology who did place her on doxycycline.  She did have some mild improvement but overall that this keeps coming back.  She does states she has pain in bilateral axilla.  No nausea vomiting fever chills.  No other associate symptoms.        The following portions of the patient's history were reviewed and  updated as appropriate: She  has a past medical history of Asthma, Bipolar 1 disorder (HCC), Closed right radial fracture, High-risk pregnancy, and PTSD (post-traumatic stress disorder).  She   Patient Active Problem List    Diagnosis Date Noted    BV (bacterial vaginosis) 10/12/2023    Candidiasis 10/12/2023    ASCUS of cervix with negative high risk HPV 06/01/2023    Dichorionic diamniotic twin pregnancy 06/01/2023    Hidradenitis suppurativa 12/21/2021    Asthma 08/17/2021    Enlarged liver 10/27/2020    Abscess 12/18/2019    Bipolar disorder, current episode mixed, mild (HCC) 11/11/2019    Bee sting allergy 11/11/2019     She  has a past surgical history that includes Mouth surgery; Colonoscopy; and [place] tooth extraction.  Her family history includes Cancer in her paternal grandmother; Diabetes in her maternal grandmother, maternal uncle, and mother; Heart attack in her mother; Heart disease in her maternal uncle and mother; Hypertension in her maternal grandmother and mother; Mental illness in her father.  She  reports that she has quit smoking. She has never used smokeless tobacco. She reports that she does not currently use alcohol. She reports that she does not use drugs.  Current Outpatient Medications   Medication Sig Dispense Refill    albuterol (PROVENTIL HFA,VENTOLIN HFA) 90 mcg/act inhaler Inhale 2 puffs every 6 (six) hours as needed for wheezing or shortness of breath 18 g 1    Cholecalciferol (Vitamin D3) 50 MCG (2000 UT) capsule       Elastic Bandages & Supports (Knee Compression Sleeve/L/XL) MISC Use daily 1 each 0    lamoTRIgine (LaMICtal) 100 mg tablet 1/2 tab po daily for 2 weeks, then 1 tab daily for 1 week, then 1.5 tablets daily 45 tablet 1    lamoTRIgine (LaMICtal) 25 mg tablet 50 mg      oxyCODONE (Roxicodone) 5 immediate release tablet Take 1 tablet (5 mg total) by mouth every 6 (six) hours as needed for severe pain Max Daily Amount: 20 mg 15 tablet 0    EPINEPHrine (EPIPEN) 0.3 mg/0.3  "mL SOAJ Inject 0.3 mL (0.3 mg total) into a muscle once for 1 dose 0.6 mL 1    famotidine (PEPCID) 10 mg tablet Take 10 mg by mouth 2 (two) times a day (Patient not taking: Reported on 4/10/2024)       No current facility-administered medications for this visit.     She is allergic to bee venom and bupropion..    Review of Systems      Review of systems completed, all negative except as noted HPI.    Objective:      /80 (BP Location: Left arm, Patient Position: Sitting, Cuff Size: Standard)   Pulse 84   Temp 97.9 °F (36.6 °C) (Temporal)   Ht 5' 2\" (1.575 m)   Wt 95.3 kg (210 lb)   SpO2 97%   Breastfeeding No   BMI 38.41 kg/m²          Physical Exam  Vitals reviewed.   Constitutional:       Appearance: Normal appearance.   HENT:      Head: Normocephalic and atraumatic.      Right Ear: External ear normal.      Left Ear: External ear normal.   Eyes:      General: No scleral icterus.        Right eye: No discharge.         Left eye: No discharge.   Cardiovascular:      Rate and Rhythm: Normal rate.   Pulmonary:      Effort: Pulmonary effort is normal. No respiratory distress.   Musculoskeletal:         General: No signs of injury. Normal range of motion.      Cervical back: Normal range of motion.      Right lower leg: No edema.      Left lower leg: No edema.   Skin:     General: Skin is warm.      Coloration: Skin is not jaundiced.      Findings: No bruising or erythema.      Comments: Bilateral axilla with evidence of active hidradenitis suppurativa.  Small amount of purulent drainage expressed.  Evidence of scarring from previous drainage.  Tender to palpation bilaterally.   Neurological:      General: No focal deficit present.      Mental Status: She is alert and oriented to person, place, and time.      Cranial Nerves: No cranial nerve deficit.   Psychiatric:         Mood and Affect: Mood normal.         Behavior: Behavior normal.         Thought Content: Thought content normal.         Judgment: " Judgment normal.

## 2024-04-13 NOTE — ASSESSMENT & PLAN NOTE
Recurrent flare of her hidradenitis suppurativa.  Once again this is within her bilateral axilla.  On exam there is expressive purulence but no need for incision and drainage.  She also has lesions within her groin near her pubic region in addition to underneath her breast.  Patient did see dermatology who just prescribed doxycycline.  At this point I do feel that she warrants further intervention with regards to medical therapy likely in the form of a biologic.  For now we will investigate other dermatologist to see if potential biologic therapy is an option.  Furthermore recommend the patient follow-up with her own dermatologist to see if this would be consideration.  Continue warm soaks bilateral axilla as needed.  Will follow-up as needed.

## 2024-05-28 ENCOUNTER — TELEPHONE (OUTPATIENT)
Dept: ADMINISTRATIVE | Facility: OTHER | Age: 34
End: 2024-05-28

## 2024-05-28 NOTE — TELEPHONE ENCOUNTER
Upon review of the In Basket request we were able to locate, review, and update the patient chart as requested for Hepatitis C , HIV, and Pap Smear (HPV) aka Cervical Cancer Screening.    Any additional questions or concerns should be emailed to the Practice Liaisons via the appropriate education email address, please do not reply via In Basket.    Thank you  LEVI FANG

## 2024-05-28 NOTE — TELEPHONE ENCOUNTER
----- Message from Satish CHAPIN sent at 5/24/2024  3:22 PM EDT -----  Regarding: hep c, hiv, pap  05/24/24 3:22 PM    Heldomonique, our patient Rafaela Peres has had Hepatitis C and HIV completed/performed. Please assist in updating the patient chart by pulling the Care Everywhere (CE) document. The date of service is 11/11/21.       05/24/24 3:23 PM    Carlton, our patient Rafaela Peres has had Pap Smear (HPV) aka Cervical Cancer Screening completed/performed. Please assist in updating the patient chart by pulling the Care Everywhere (CE) document. The date of service is 3/28/23.     Thank you,  Satish Quintana MA  [facility]

## 2024-06-22 ENCOUNTER — APPOINTMENT (EMERGENCY)
Dept: RADIOLOGY | Facility: HOSPITAL | Age: 34
End: 2024-06-22
Payer: COMMERCIAL

## 2024-06-22 ENCOUNTER — HOSPITAL ENCOUNTER (EMERGENCY)
Facility: HOSPITAL | Age: 34
Discharge: HOME | End: 2024-06-22
Attending: EMERGENCY MEDICINE
Payer: COMMERCIAL

## 2024-06-22 VITALS
BODY MASS INDEX: 38.64 KG/M2 | RESPIRATION RATE: 16 BRPM | HEART RATE: 83 BPM | WEIGHT: 210 LBS | TEMPERATURE: 97.4 F | DIASTOLIC BLOOD PRESSURE: 72 MMHG | HEIGHT: 62 IN | OXYGEN SATURATION: 98 % | SYSTOLIC BLOOD PRESSURE: 128 MMHG

## 2024-06-22 DIAGNOSIS — E83.42 HYPOMAGNESEMIA: ICD-10-CM

## 2024-06-22 DIAGNOSIS — R60.0 PERIPHERAL EDEMA: Primary | ICD-10-CM

## 2024-06-22 LAB
ALBUMIN SERPL BCG-MCNC: 4.1 G/DL (ref 3.5–5)
ALP SERPL-CCNC: 61 U/L (ref 34–104)
ALT SERPL W P-5'-P-CCNC: 16 U/L (ref 7–52)
ANION GAP SERPL CALCULATED.3IONS-SCNC: 8 MMOL/L (ref 4–13)
AST SERPL W P-5'-P-CCNC: 13 U/L (ref 13–39)
BACTERIA UR QL AUTO: ABNORMAL /HPF
BASOPHILS # BLD AUTO: 0 THOUSANDS/ÂΜL (ref 0–0.1)
BASOPHILS NFR BLD AUTO: 0 % (ref 0–1)
BILIRUB SERPL-MCNC: 0.41 MG/DL (ref 0.2–1)
BILIRUB UR QL STRIP: NEGATIVE
BNP SERPL-MCNC: 37 PG/ML (ref 0–100)
BUN SERPL-MCNC: 11 MG/DL (ref 5–25)
CALCIUM SERPL-MCNC: 9.3 MG/DL (ref 8.4–10.2)
CARDIAC TROPONIN I PNL SERPL HS: <2 NG/L
CHLORIDE SERPL-SCNC: 106 MMOL/L (ref 96–108)
CLARITY UR: CLEAR
CO2 SERPL-SCNC: 23 MMOL/L (ref 21–32)
COLOR UR: YELLOW
CREAT SERPL-MCNC: 0.68 MG/DL (ref 0.6–1.3)
EOSINOPHIL # BLD AUTO: 0 THOUSAND/ÂΜL (ref 0–0.61)
EOSINOPHIL NFR BLD AUTO: 0 % (ref 0–6)
ERYTHROCYTE [DISTWIDTH] IN BLOOD BY AUTOMATED COUNT: 14.7 % (ref 11.6–15.1)
EXT PREGNANCY TEST URINE: NEGATIVE
EXT. CONTROL: NORMAL
GFR SERPL CREATININE-BSD FRML MDRD: 116 ML/MIN/1.73SQ M
GLUCOSE SERPL-MCNC: 90 MG/DL (ref 65–140)
GLUCOSE UR STRIP-MCNC: NEGATIVE MG/DL
HCT VFR BLD AUTO: 37.8 % (ref 34.8–46.1)
HGB BLD-MCNC: 12.3 G/DL (ref 11.5–15.4)
HGB UR QL STRIP.AUTO: NEGATIVE
IMM GRANULOCYTES # BLD AUTO: 0.01 THOUSAND/UL (ref 0–0.2)
IMM GRANULOCYTES NFR BLD AUTO: 0 % (ref 0–2)
KETONES UR STRIP-MCNC: NEGATIVE MG/DL
LEUKOCYTE ESTERASE UR QL STRIP: NEGATIVE
LYMPHOCYTES # BLD AUTO: 2.54 THOUSANDS/ÂΜL (ref 0.6–4.47)
LYMPHOCYTES NFR BLD AUTO: 36 % (ref 14–44)
MAGNESIUM SERPL-MCNC: 1.8 MG/DL (ref 1.9–2.7)
MCH RBC QN AUTO: 28 PG (ref 26.8–34.3)
MCHC RBC AUTO-ENTMCNC: 32.5 G/DL (ref 31.4–37.4)
MCV RBC AUTO: 86 FL (ref 82–98)
MONOCYTES # BLD AUTO: 0.45 THOUSAND/ÂΜL (ref 0.17–1.22)
MONOCYTES NFR BLD AUTO: 6 % (ref 4–12)
NEUTROPHILS # BLD AUTO: 4.06 THOUSANDS/ÂΜL (ref 1.85–7.62)
NEUTS SEG NFR BLD AUTO: 58 % (ref 43–75)
NITRITE UR QL STRIP: NEGATIVE
NON-SQ EPI CELLS URNS QL MICRO: ABNORMAL /HPF
NRBC BLD AUTO-RTO: 0 /100 WBCS
PH UR STRIP.AUTO: 7 [PH]
PLATELET # BLD AUTO: 188 THOUSANDS/UL (ref 149–390)
PMV BLD AUTO: 10.1 FL (ref 8.9–12.7)
POTASSIUM SERPL-SCNC: 3.7 MMOL/L (ref 3.5–5.3)
PROT SERPL-MCNC: 7 G/DL (ref 6.4–8.4)
PROT UR STRIP-MCNC: ABNORMAL MG/DL
RBC # BLD AUTO: 4.39 MILLION/UL (ref 3.81–5.12)
RBC #/AREA URNS AUTO: ABNORMAL /HPF
SODIUM SERPL-SCNC: 137 MMOL/L (ref 135–147)
SP GR UR STRIP.AUTO: 1.02 (ref 1–1.03)
TSH SERPL DL<=0.05 MIU/L-ACNC: 0.66 UIU/ML (ref 0.45–4.5)
UROBILINOGEN UR STRIP-ACNC: <2 MG/DL
WBC # BLD AUTO: 7.06 THOUSAND/UL (ref 4.31–10.16)
WBC #/AREA URNS AUTO: ABNORMAL /HPF

## 2024-06-22 PROCEDURE — 83735 ASSAY OF MAGNESIUM: CPT | Performed by: PHYSICIAN ASSISTANT

## 2024-06-22 PROCEDURE — 80053 COMPREHEN METABOLIC PANEL: CPT | Performed by: PHYSICIAN ASSISTANT

## 2024-06-22 PROCEDURE — 36415 COLL VENOUS BLD VENIPUNCTURE: CPT | Performed by: PHYSICIAN ASSISTANT

## 2024-06-22 PROCEDURE — 83880 ASSAY OF NATRIURETIC PEPTIDE: CPT | Performed by: PHYSICIAN ASSISTANT

## 2024-06-22 PROCEDURE — 85025 COMPLETE CBC W/AUTO DIFF WBC: CPT | Performed by: PHYSICIAN ASSISTANT

## 2024-06-22 PROCEDURE — 84484 ASSAY OF TROPONIN QUANT: CPT | Performed by: PHYSICIAN ASSISTANT

## 2024-06-22 PROCEDURE — 81025 URINE PREGNANCY TEST: CPT | Performed by: PHYSICIAN ASSISTANT

## 2024-06-22 PROCEDURE — 71045 X-RAY EXAM CHEST 1 VIEW: CPT

## 2024-06-22 PROCEDURE — 99283 EMERGENCY DEPT VISIT LOW MDM: CPT

## 2024-06-22 PROCEDURE — 84443 ASSAY THYROID STIM HORMONE: CPT | Performed by: PHYSICIAN ASSISTANT

## 2024-06-22 PROCEDURE — 81001 URINALYSIS AUTO W/SCOPE: CPT | Performed by: PHYSICIAN ASSISTANT

## 2024-06-22 PROCEDURE — 99285 EMERGENCY DEPT VISIT HI MDM: CPT | Performed by: PHYSICIAN ASSISTANT

## 2024-06-22 PROCEDURE — 93005 ELECTROCARDIOGRAM TRACING: CPT

## 2024-06-22 RX ORDER — DEXTROSE MONOHYDRATE 25 G/50ML
25 INJECTION, SOLUTION INTRAVENOUS ONCE
Status: DISCONTINUED | OUTPATIENT
Start: 2024-06-22 | End: 2024-06-22

## 2024-06-22 RX ORDER — LANOLIN ALCOHOL/MO/W.PET/CERES
800 CREAM (GRAM) TOPICAL ONCE
Status: COMPLETED | OUTPATIENT
Start: 2024-06-22 | End: 2024-06-22

## 2024-06-22 RX ADMIN — Medication 800 MG: at 11:27

## 2024-06-22 NOTE — ED PROVIDER NOTES
History  Chief Complaint   Patient presents with    Ankle Swelling     Patient reports swelling in b/l hands, feet and face for the past two days.      This is a 33-year-old female presenting to the emergency department today for evaluation of bilateral hand, bilateral leg, and facial edema.  She states this has been ongoing over the last few days.  She states she sometimes has occasional chest pressure however that is not a novel finding.  She denies any shortness of breath.  Denies any other systemic symptoms.  Urinating normally producing stool without difficulty.  No fever.  She states she has somewhat of a headache however this is a chronic condition for her as well.  Vital signs reviewed within normal limits.  Past surgical history positive for  section of twins earlier this year approximately the month of February.  She states the twins did come 5 weeks early.  She does not believe she vital hypertension during the pregnancy.  She has seen cardiology in the past for irregular heartbeat however has not followed up with them since pregnancy.  She is well-appearing here at bedside no acute distress.        Prior to Admission Medications   Prescriptions Last Dose Informant Patient Reported? Taking?   Cholecalciferol (Vitamin D3) 50 MCG (2000) capsule   Yes No   EPINEPHrine (EPIPEN) 0.3 mg/0.3 mL SOAJ   No No   Sig: Inject 0.3 mL (0.3 mg total) into a muscle once for 1 dose   Elastic Bandages & Supports (Knee Compression Sleeve/L/XL) MISC   No No   Sig: Use daily   albuterol (PROVENTIL HFA,VENTOLIN HFA) 90 mcg/act inhaler  Self No No   Sig: Inhale 2 puffs every 6 (six) hours as needed for wheezing or shortness of breath   famotidine (PEPCID) 10 mg tablet   Yes No   Sig: Take 10 mg by mouth 2 (two) times a day   Patient not taking: Reported on 4/10/2024   lamoTRIgine (LaMICtal) 100 mg tablet   No No   Si/2 tab po daily for 2 weeks, then 1 tab daily for 1 week, then 1.5 tablets daily   lamoTRIgine  (LaMICtal) 25 mg tablet   Yes No   Si mg   oxyCODONE (Roxicodone) 5 immediate release tablet   No No   Sig: Take 1 tablet (5 mg total) by mouth every 6 (six) hours as needed for severe pain Max Daily Amount: 20 mg      Facility-Administered Medications: None       Past Medical History:   Diagnosis Date    Asthma     Bipolar 1 disorder (HCC)     Closed right radial fracture     High-risk pregnancy     PTSD (post-traumatic stress disorder)        Past Surgical History:   Procedure Laterality Date    COLONOSCOPY      MOUTH SURGERY      WISDOM TOOTH EXTRACTION         Family History   Problem Relation Age of Onset    Diabetes Mother     Heart attack Mother     Heart disease Mother     Hypertension Mother     Mental illness Father     Diabetes Maternal Grandmother     Hypertension Maternal Grandmother     Cancer Paternal Grandmother     Diabetes Maternal Uncle     Heart disease Maternal Uncle      I have reviewed and agree with the history as documented.    E-Cigarette/Vaping    E-Cigarette Use Never User      E-Cigarette/Vaping Substances    Nicotine No     THC No     CBD No     Flavoring No     Other No     Unknown No      Social History     Tobacco Use    Smoking status: Former    Smokeless tobacco: Never   Vaping Use    Vaping status: Never Used   Substance Use Topics    Alcohol use: Not Currently     Comment: social    Drug use: Never       Review of Systems   Constitutional:  Negative for chills and fever.   HENT:  Negative for ear pain and sore throat.    Eyes:  Negative for pain and visual disturbance.   Respiratory:  Negative for cough and shortness of breath.    Cardiovascular:  Positive for chest pain and leg swelling. Negative for palpitations.   Gastrointestinal:  Negative for abdominal pain and vomiting.   Genitourinary:  Negative for dysuria and hematuria.   Musculoskeletal:  Negative for arthralgias and back pain.   Skin:  Negative for color change and rash.   Neurological:  Positive for headaches.  Negative for seizures and syncope.   All other systems reviewed and are negative.      Physical Exam  Physical Exam  Constitutional:       General: She is not in acute distress.     Appearance: She is well-developed. She is not ill-appearing, toxic-appearing or diaphoretic.   HENT:      Right Ear: External ear normal. No swelling. Tympanic membrane is not bulging.      Left Ear: External ear normal. No swelling. Tympanic membrane is not bulging.      Nose: Nose normal.      Mouth/Throat:      Pharynx: No oropharyngeal exudate.   Eyes:      General: Lids are normal.      Conjunctiva/sclera: Conjunctivae normal.      Pupils: Pupils are equal, round, and reactive to light.   Neck:      Thyroid: No thyromegaly.      Vascular: No JVD.      Trachea: No tracheal deviation.   Cardiovascular:      Rate and Rhythm: Normal rate and regular rhythm.      Pulses: Normal pulses.      Heart sounds: Normal heart sounds. No murmur heard.     No friction rub. No gallop.   Pulmonary:      Effort: Pulmonary effort is normal. No respiratory distress.      Breath sounds: Normal breath sounds. No stridor. No wheezing, rhonchi or rales.   Chest:      Chest wall: No tenderness.   Abdominal:      General: Bowel sounds are normal. There is no distension.      Palpations: Abdomen is soft. There is no mass.      Tenderness: There is no abdominal tenderness. There is no guarding or rebound.      Hernia: No hernia is present.   Musculoskeletal:         General: No deformity or signs of injury. Normal range of motion.      Cervical back: Normal range of motion and neck supple. No edema. Normal range of motion.      Comments: Extremely trace amount of edema that is nonpitting bilateral upper and lower extremities.   Lymphadenopathy:      Cervical: No cervical adenopathy.   Skin:     General: Skin is warm and dry.      Coloration: Skin is not pale.      Findings: No erythema or rash.   Neurological:      Mental Status: She is alert and oriented to  person, place, and time.      GCS: GCS eye subscore is 4. GCS verbal subscore is 5. GCS motor subscore is 6.      Cranial Nerves: No cranial nerve deficit.      Sensory: No sensory deficit.      Deep Tendon Reflexes: Reflexes are normal and symmetric.   Psychiatric:         Speech: Speech normal.         Behavior: Behavior normal.         Vital Signs  ED Triage Vitals [06/22/24 1007]   Temperature Pulse Respirations Blood Pressure SpO2   (!) 97.4 °F (36.3 °C) 83 16 128/72 98 %      Temp Source Heart Rate Source Patient Position - Orthostatic VS BP Location FiO2 (%)   Temporal Monitor Sitting Left arm --      Pain Score       6           Vitals:    06/22/24 1007   BP: 128/72   Pulse: 83   Patient Position - Orthostatic VS: Sitting         Visual Acuity      ED Medications  Medications   magnesium Oxide (MAG-OX) tablet 800 mg (800 mg Oral Given 6/22/24 1127)       Diagnostic Studies  Results Reviewed       Procedure Component Value Units Date/Time    Urine Microscopic [[id number]]  (Abnormal) Collected: 06/22/24 1109    Lab Status: Final result Specimen: Urine, Clean Catch Updated: 06/22/24 1123     RBC, UA None Seen /hpf      WBC, UA None Seen /hpf      Epithelial Cells Moderate /hpf      Bacteria, UA Occasional /hpf     UA (URINE) with reflex to Scope [[id number]]  (Abnormal) Collected: 06/22/24 1109    Lab Status: Final result Specimen: Urine, Clean Catch Updated: 06/22/24 1116     Color, UA Yellow     Clarity, UA Clear     Specific Gravity, UA 1.025     pH, UA 7.0     Leukocytes, UA Negative     Nitrite, UA Negative     Protein, UA Trace mg/dl      Glucose, UA Negative mg/dl      Ketones, UA Negative mg/dl      Urobilinogen, UA <2.0 mg/dl      Bilirubin, UA Negative     Occult Blood, UA Negative    TSH [[id number]]  (Normal) Collected: 06/22/24 1039    Lab Status: Final result Specimen: Blood from Arm, Right Updated: 06/22/24 1116     TSH 3RD GENERATON 0.660 uIU/mL     POCT pregnancy, urine [[id number]]  (Normal)  Resulted: 06/22/24 1110    Lab Status: Final result Updated: 06/22/24 1110     EXT Preg Test, Ur Negative     Control Valid    HS Troponin 0hr (reflex protocol) [[id number]]  (Normal) Collected: 06/22/24 1039    Lab Status: Final result Specimen: Blood from Arm, Right Updated: 06/22/24 1107     hs TnI 0hr <2 ng/L     B-Type Natriuretic Peptide(BNP) [[id number]]  (Normal) Collected: 06/22/24 1039    Lab Status: Final result Specimen: Blood from Arm, Right Updated: 06/22/24 1107     BNP 37 pg/mL     Comprehensive metabolic panel [[id number]] Collected: 06/22/24 1039    Lab Status: Final result Specimen: Blood from Arm, Right Updated: 06/22/24 1102     Sodium 137 mmol/L      Potassium 3.7 mmol/L      Chloride 106 mmol/L      CO2 23 mmol/L      ANION GAP 8 mmol/L      BUN 11 mg/dL      Creatinine 0.68 mg/dL      Glucose 90 mg/dL      Calcium 9.3 mg/dL      AST 13 U/L      ALT 16 U/L      Alkaline Phosphatase 61 U/L      Total Protein 7.0 g/dL      Albumin 4.1 g/dL      Total Bilirubin 0.41 mg/dL      eGFR 116 ml/min/1.73sq m     Narrative:      National Kidney Disease Foundation guidelines for Chronic Kidney Disease (CKD):     Stage 1 with normal or high GFR (GFR > 90 mL/min/1.73 square meters)    Stage 2 Mild CKD (GFR = 60-89 mL/min/1.73 square meters)    Stage 3A Moderate CKD (GFR = 45-59 mL/min/1.73 square meters)    Stage 3B Moderate CKD (GFR = 30-44 mL/min/1.73 square meters)    Stage 4 Severe CKD (GFR = 15-29 mL/min/1.73 square meters)    Stage 5 End Stage CKD (GFR <15 mL/min/1.73 square meters)  Note: GFR calculation is accurate only with a steady state creatinine    Magnesium [[id number]]  (Abnormal) Collected: 06/22/24 1039    Lab Status: Final result Specimen: Blood from Arm, Right Updated: 06/22/24 1102     Magnesium 1.8 mg/dL     CBC and differential [[id number]] Collected: 06/22/24 1039    Lab Status: Final result Specimen: Blood from Arm, Right Updated: 06/22/24 1045     WBC 7.06 Thousand/uL      RBC 4.39  Million/uL      Hemoglobin 12.3 g/dL      Hematocrit 37.8 %      MCV 86 fL      MCH 28.0 pg      MCHC 32.5 g/dL      RDW 14.7 %      MPV 10.1 fL      Platelets 188 Thousands/uL      nRBC 0 /100 WBCs      Segmented % 58 %      Immature Grans % 0 %      Lymphocytes % 36 %      Monocytes % 6 %      Eosinophils Relative 0 %      Basophils Relative 0 %      Absolute Neutrophils 4.06 Thousands/µL      Absolute Immature Grans 0.01 Thousand/uL      Absolute Lymphocytes 2.54 Thousands/µL      Absolute Monocytes 0.45 Thousand/µL      Eosinophils Absolute 0.00 Thousand/µL      Basophils Absolute 0.00 Thousands/µL                    XR chest 1 view portable    (Results Pending)              Procedures  ECG 12 Lead Documentation Only    Date/Time: 6/22/2024 11:49 AM    Performed by: Livan Ribeiro PA-C  Authorized by: Livan Ribeiro PA-C    Indications / Diagnosis:  Chest pain  ECG reviewed by me, the ED Provider: yes    Patient location:  ED  Previous ECG:     Comparison to cardiac monitor: Yes    Interpretation:     Interpretation: normal    Rate:     ECG rate:  67    ECG rate assessment: normal    Rhythm:     Rhythm: sinus rhythm    Ectopy:     Ectopy: none    QRS:     QRS axis:  Normal    QRS intervals:  Normal  Conduction:     Conduction: normal    ST segments:     ST segments:  Normal  T waves:     T waves: normal             ED Course  ED Course as of 06/22/24 1211   Sat Jun 22, 2024   1059 WBC: 7.06   1059 Hemoglobin: 12.3   1100 Platelet Count: 188  CBC reviewed no leukocytosis anemia or thrombocytopenia.   1100 SpO2: 98 %   1100 Respirations: 16   1100 Pulse: 83   1100 Temperature(!): 97.4 °F (36.3 °C)   1100 Blood Pressure: 128/72  VS reviewed within normal limits.   1121 PREGNANCY TEST URINE: Negative   1121 Bilirubin Urine: Negative   1121 SL AMB SPECIFIC GRAVITY_URINE: 1.025   1121 Color, UA: Yellow   1122 TSH 3RD GENERATON: 0.660   1122 hs TnI 0hr: <2   1123 Urine micro in process then likely  discharge.   1206 Bacteria, UA: Occasional   1206 WBC, UA: None Seen   1206 RBC Urine: None Seen             HEART Risk Score      Flowsheet Row Most Recent Value   Heart Score Risk Calculator    History 0 Filed at: 06/22/2024 1149   ECG 0 Filed at: 06/22/2024 1149   Age 0 Filed at: 06/22/2024 1149   Risk Factors 1 Filed at: 06/22/2024 1149   Troponin 0 Filed at: 06/22/2024 1149   HEART Score 1 Filed at: 06/22/2024 1149                          SBIRT 20yo+      Flowsheet Row Most Recent Value   Initial Alcohol Screen: US AUDIT-C     1. How often do you have a drink containing alcohol? 0 Filed at: 06/22/2024 1008   2. How many drinks containing alcohol do you have on a typical day you are drinking?  0 Filed at: 06/22/2024 1008   3a. Male UNDER 65: How often do you have five or more drinks on one occasion? 0 Filed at: 06/22/2024 1008   3b. FEMALE Any Age, or MALE 65+: How often do you have 4 or more drinks on one occassion? 0 Filed at: 06/22/2024 1008   Audit-C Score 0 Filed at: 06/22/2024 1008   NILES: How many times in the past year have you...    Used an illegal drug or used a prescription medication for non-medical reasons? Never Filed at: 06/22/2024 1008                      Medical Decision Making  33-year-old female here for evaluation of facial, leg, arm edema ongoing for the last few days.  Has a headache today however states that is chronic she discusses chest pain during review of systems questioning it is somewhat intermittent however none today.  Vital signs reviewed within reasonable limits.  Differential diagnosis in this patient includes congestive heart failure, acute kidney injury, electrolyte disturbance, thyroid abnormality, acute coronary syndrome, blood pressure abnormality.  Workup is generally benign,  Magnesium level is minimally low which was replaced, no evidence of volume overload in general chest x-ray does not show effusions.  This potentially could be secondary to heat and salt intake.   Recommend keeping her legs up at home compression socks as needed avoiding salt follow-up with primary care.    Amount and/or Complexity of Data Reviewed  Labs: ordered. Decision-making details documented in ED Course.  Radiology: ordered.    Risk  OTC drugs.             Disposition  Final diagnoses:   Peripheral edema   Hypomagnesemia     Time reflects when diagnosis was documented in both MDM as applicable and the Disposition within this note       Time User Action Codes Description Comment    6/22/2024 12:07 PM Livan Ribeiro Add [R60.0] Peripheral edema     6/22/2024 12:07 PM Livan Ribeiro Add [E83.42] Hypomagnesemia           ED Disposition       ED Disposition   Discharge    Condition   Stable    Date/Time   Sat Jun 22, 2024 1207    Comment   Rafaela Peres discharge to home/self care.                   Follow-up Information       Follow up With Specialties Details Why Contact Info    Marisol Solis PA-C Family Medicine, Physician Assistant   [address] [place] [id number] [phone]              Patient's Medications   Discharge Prescriptions    No medications on file       No discharge procedures on file.    PDMP Review         Value Time User    PDMP Reviewed  Yes 3/28/2024 10:51 AM Zak Philip MD            ED Provider  Electronically Signed by             Livan Ribeiro PA-C  06/22/24 1211

## 2024-06-22 NOTE — Clinical Note
Rafaela Peres was seen and treated in our emergency department on 6/22/2024.    No restrictions            Diagnosis:     Rafaela  may return to work on return date.    She may return on this date: 06/24/2024      Patient is excused from work on the 22nd and 23 June.     If you have any questions or concerns, please don't hesitate to call.      Livan Ribeiro PA-C    ______________________________           _______________          _______________  Hospital Representative                              Date                                Time

## 2024-06-22 NOTE — ED NOTES
Pt aware of need for urine specimen unable to void at this time     Susie Scales RN  06/22/24 [id number]

## 2024-06-25 LAB
ATRIAL RATE: 72 BPM
ATRIAL RATE: 73 BPM
P AXIS: 47 DEGREES
P AXIS: 54 DEGREES
PR INTERVAL: 128 MS
PR INTERVAL: 132 MS
QRS AXIS: 63 DEGREES
QRS AXIS: 64 DEGREES
QRSD INTERVAL: 82 MS
QRSD INTERVAL: 84 MS
QT INTERVAL: 392 MS
QT INTERVAL: 398 MS
QTC INTERVAL: 429 MS
QTC INTERVAL: 438 MS
T WAVE AXIS: 26 DEGREES
T WAVE AXIS: 27 DEGREES
VENTRICULAR RATE: 72 BPM
VENTRICULAR RATE: 73 BPM

## 2024-06-25 PROCEDURE — 93010 ELECTROCARDIOGRAM REPORT: CPT | Performed by: INTERNAL MEDICINE

## 2024-07-02 ENCOUNTER — OFFICE VISIT (OUTPATIENT)
Dept: FAMILY MEDICINE CLINIC | Facility: CLINIC | Age: 34
End: 2024-07-02
Payer: COMMERCIAL

## 2024-07-02 VITALS
HEART RATE: 89 BPM | BODY MASS INDEX: 37.91 KG/M2 | DIASTOLIC BLOOD PRESSURE: 82 MMHG | WEIGHT: 206 LBS | OXYGEN SATURATION: 98 % | SYSTOLIC BLOOD PRESSURE: 136 MMHG | HEIGHT: 62 IN

## 2024-07-02 DIAGNOSIS — R07.9 CHEST PAIN, UNSPECIFIED TYPE: ICD-10-CM

## 2024-07-02 DIAGNOSIS — R55 SYNCOPE, UNSPECIFIED SYNCOPE TYPE: Primary | ICD-10-CM

## 2024-07-02 PROCEDURE — 99496 TRANSJ CARE MGMT HIGH F2F 7D: CPT

## 2024-07-05 PROBLEM — B37.9 CANDIDIASIS: Status: RESOLVED | Noted: 2023-10-12 | Resolved: 2024-07-05

## 2024-07-05 PROBLEM — R55 SYNCOPE: Status: ACTIVE | Noted: 2024-07-05

## 2024-07-05 PROBLEM — N76.0 BV (BACTERIAL VAGINOSIS): Status: RESOLVED | Noted: 2023-10-12 | Resolved: 2024-07-05

## 2024-07-05 PROBLEM — B96.89 BV (BACTERIAL VAGINOSIS): Status: RESOLVED | Noted: 2023-10-12 | Resolved: 2024-07-05

## 2024-07-05 NOTE — PROGRESS NOTES
Transition of Care Visit  Name: Rafaela Peres      : 1990      MRN: [id number]  Encounter Provider: Saray King MD  Encounter Date: 2024   Encounter department: [facility]    Assessment & Plan   1. Syncope, unspecified syncope type  Assessment & Plan:  Recent ED visit after syncopal episode  Prior episodes during last pregnancy  Neurologic vs Cardiogenic vs Stress etiology  Patient wants to follow w cardio, will consider neuro f/u  Continue to monitor  Orders:  -     Ambulatory Referral to Cardiology; Future  2. Chest pain, unspecified type  -     Ambulatory Referral to Cardiology; Future         History of Present Illness     Transitional Care Management Review:   Rafaela Peres is a 33 y.o. female here for TCM follow up.     During the TCM phone call patient stated:  TCM Call       None          TCM Call       None          Ms Peres is a 33F w a PMH of bipolar disorder previously managed with lamotrigine, following up after recent ED visit. [facility]  after a syncopal episode. Patient was driving when she developed aching chest pain, extending into arms bilaterally, described as feeling heavy. She pulled over, alerted the oldest child in the car with her of the situation, and then passed out for an undetermined amount of time. Patient had blood work, EKG, and CT head obtained, all of which were unremarkable for acute pathology. They believed the symptoms to be cardiac in nature, and encouraged her to follow up with cardio in the outpatient setting. Patient reports similar episodes during pregnancy, and had been following with cardiology at that time, but hasn't seen them since the end of . An Echo and holter obtained last year were unremarkable. Spoke w patient about other etiologies of symptoms, such as neurologic, or stress. Patient endorses significant life stress. Patient is interested in continuing with  "cardiology, given her family history of cardiac disease. We will continue to monitor these symptoms. She is otherwise in her usual state of health.     Review of Systems   Constitutional:  Positive for fatigue. Negative for activity change, appetite change, chills and fever.   HENT:  Negative for congestion, ear pain, postnasal drip, rhinorrhea and sore throat.    Eyes:  Negative for pain and visual disturbance.   Respiratory:  Negative for cough, chest tightness, shortness of breath and wheezing.    Cardiovascular:  Negative for chest pain and palpitations.   Gastrointestinal:  Negative for abdominal pain, constipation, diarrhea, nausea and vomiting.   Genitourinary:  Negative for dysuria and hematuria.   Musculoskeletal:  Negative for arthralgias and back pain.   Skin:  Negative for color change and rash.   Neurological:  Negative for dizziness, seizures, syncope, weakness, light-headedness, numbness and headaches.   Psychiatric/Behavioral:  Positive for agitation and sleep disturbance. Negative for confusion and dysphoric mood. The patient is not nervous/anxious.    All other systems reviewed and are negative.    Objective     /82   Pulse 89   Ht 5' 2\" (1.575 m)   Wt 93.4 kg (206 lb)   LMP 06/01/2024   SpO2 98%   BMI 37.68 kg/m²     Physical Exam  Vitals and nursing note reviewed.   Constitutional:       General: She is not in acute distress.     Appearance: Normal appearance. She is obese. She is not ill-appearing.   HENT:      Head: Normocephalic and atraumatic.      Right Ear: External ear normal.      Left Ear: External ear normal.      Nose: Nose normal.      Mouth/Throat:      Mouth: Mucous membranes are moist.      Pharynx: Oropharynx is clear.   Eyes:      Extraocular Movements: Extraocular movements intact.      Conjunctiva/sclera: Conjunctivae normal.   Cardiovascular:      Rate and Rhythm: Normal rate and regular rhythm.      Pulses: Normal pulses.      Heart sounds: Normal heart sounds. No " murmur heard.     No gallop.   Pulmonary:      Effort: Pulmonary effort is normal. No respiratory distress.      Breath sounds: Normal breath sounds. No wheezing or rales.   Abdominal:      General: Abdomen is flat. Bowel sounds are normal. There is no distension.      Palpations: Abdomen is soft.      Tenderness: There is no abdominal tenderness.   Musculoskeletal:         General: No swelling or signs of injury. Normal range of motion.      Cervical back: Normal range of motion. No rigidity.   Skin:     General: Skin is warm and dry.      Findings: No erythema or rash.   Neurological:      General: No focal deficit present.      Mental Status: She is alert and oriented to person, place, and time.   Psychiatric:         Mood and Affect: Mood normal.         Behavior: Behavior normal.     Medications have been reviewed by provider in current encounter    Administrative Statements

## 2024-07-05 NOTE — ASSESSMENT & PLAN NOTE
Recent ED visit after syncopal episode  Prior episodes during last pregnancy  Neurologic vs Cardiogenic vs Stress etiology  Patient wants to follow w cardio, will consider neuro f/u  Continue to monitor

## 2024-07-15 ENCOUNTER — TELEMEDICINE (OUTPATIENT)
Dept: FAMILY MEDICINE CLINIC | Facility: CLINIC | Age: 34
End: 2024-07-15
Payer: COMMERCIAL

## 2024-07-15 DIAGNOSIS — Z32.01 PREGNANCY TEST POSITIVE: Primary | ICD-10-CM

## 2024-07-15 DIAGNOSIS — R11.0 NAUSEA: ICD-10-CM

## 2024-07-15 DIAGNOSIS — R10.2 PELVIC AND PERINEAL PAIN: ICD-10-CM

## 2024-07-15 PROCEDURE — 99213 OFFICE O/P EST LOW 20 MIN: CPT | Performed by: PHYSICIAN ASSISTANT

## 2024-07-15 PROCEDURE — 99214 OFFICE O/P EST MOD 30 MIN: CPT | Performed by: FAMILY MEDICINE

## 2024-07-15 NOTE — PROGRESS NOTES
Assessment/Plan:      Diagnoses and all orders for this visit:    Pregnancy test positive  -     hCG, quantitative; Future    Pelvic and perineal pain  -     hCG, quantitative; Future    Nausea        Will call with results & further recommendations following lab draw. She states she will go AM of 7/16      Subjective:     Patient ID: Rafaela Peres is a 33 y.o. female presents with concern of pregnancy. For the last 2 weeks she has bene having headaches, fatigue, nausea. She had a faint positive UPT however she is s/p tubal ligations. She states she had bleedingabout 1-2 weeks ago that was lighter than her usual period. She is unsure of the exact day. She is specifically requesting HCG blood work.     HPI    Review of Systems   Constitutional:  Positive for activity change and appetite change.   Gastrointestinal:  Positive for nausea. Negative for abdominal distention, abdominal pain and vomiting.        Abdominal cramping    Genitourinary:  Negative for menstrual problem and pelvic pain.   Neurological:  Positive for headaches.         Objective:     Physical Exam  Constitutional:       Appearance: Normal appearance.   Neurological:      Mental Status: She is alert.

## 2024-07-16 ENCOUNTER — APPOINTMENT (OUTPATIENT)
Dept: LAB | Facility: CLINIC | Age: 34
End: 2024-07-16
Payer: COMMERCIAL

## 2024-07-16 DIAGNOSIS — Z32.01 PREGNANCY TEST POSITIVE: ICD-10-CM

## 2024-07-16 DIAGNOSIS — R10.2 PELVIC AND PERINEAL PAIN: ICD-10-CM

## 2024-07-16 LAB — B-HCG SERPL-ACNC: <0.6 MIU/ML (ref 0–5)

## 2024-07-16 PROCEDURE — 84702 CHORIONIC GONADOTROPIN TEST: CPT

## 2024-07-16 PROCEDURE — 36415 COLL VENOUS BLD VENIPUNCTURE: CPT

## 2024-08-06 ENCOUNTER — TELEMEDICINE (OUTPATIENT)
Dept: FAMILY MEDICINE CLINIC | Facility: CLINIC | Age: 34
End: 2024-08-06
Payer: COMMERCIAL

## 2024-08-06 DIAGNOSIS — L50.9 HIVES: Primary | ICD-10-CM

## 2024-08-06 DIAGNOSIS — T78.40XA ALLERGY, INITIAL ENCOUNTER: ICD-10-CM

## 2024-08-06 DIAGNOSIS — F31.61 BIPOLAR DISORDER, CURRENT EPISODE MIXED, MILD (HCC): ICD-10-CM

## 2024-08-06 PROCEDURE — 99213 OFFICE O/P EST LOW 20 MIN: CPT

## 2024-08-06 RX ORDER — DULOXETIN HYDROCHLORIDE 20 MG/1
20 CAPSULE, DELAYED RELEASE ORAL DAILY
Qty: 30 CAPSULE | Refills: 0 | Status: SHIPPED | OUTPATIENT
Start: 2024-08-06 | End: 2024-09-03

## 2024-08-06 RX ORDER — CETIRIZINE HYDROCHLORIDE 10 MG/1
10 TABLET ORAL DAILY
Qty: 30 TABLET | Refills: 2 | Status: SHIPPED | OUTPATIENT
Start: 2024-08-06 | End: 2024-11-04

## 2024-08-06 NOTE — PROGRESS NOTES
Virtual Regular Visit  Name: Rafaeal Peres      : 1990      MRN: [id number]  Encounter Provider: Saray King MD  Encounter Date: 2024   Encounter department: [facility]    Verification of patient location:    Patient is located at Home in the following state in which I hold an active license [place]    Assessment & Plan   1. Hives  -     Food Specific IgG Allergy (Adult) Panel; Future  -     [facility] Allergy Panel, Adult; Future  2. Allergy, initial encounter  Assessment & Plan:  More frequent episodes of hives, swelling of lips  Very itchy  Unclear trigger  Would like testing to help find source  Agreeable to lab work  Consider allergy specialist referral   Continue to monitor  Orders:  -     [facility] Allergy Panel, Adult; Future  -     C1 esterase inhibitor; Future  -     C4 complement; Future  -     cetirizine (ZyrTEC) 10 mg tablet; Take 1 tablet (10 mg total) by mouth daily  3. Bipolar disorder, current episode mixed, mild (HCC)  Assessment & Plan:  Previously managed w Lamictal  Patient reports minimal improvement of symptoms when on this med  Has not taken Lamictal in several months  Agreeable to trying Duloxetine   Will continue to monitor  F/u 1-2mo  Orders:  -     DULoxetine (CYMBALTA) 20 mg capsule; Take 1 capsule (20 mg total) by mouth daily         Encounter provider Saray King MD    The patient was identified by name and date of birth. Rafaela Peres was informed that this is a telemedicine visit and that the visit is being conducted through the Epic Embedded platform. She agrees to proceed..  My office door was closed. No one else was in the room.  She acknowledged consent and understanding of privacy and security of the video platform. The patient has agreed to participate and understands they can discontinue the visit at any time.    Patient is aware this is a billable service.     History of Present Illness     Ms  Shahbaz Peres is a 33F w a PMH significant for bipolar disorder, being seen via telemed because of frequent allergic reactions. Patient reporting increasing frequency of episodes of breaking out in hives across arms, legs, very itchy, occassionally w lip swelling. Relieved minimally by benadryl, but very sedating. Denies tongue or throat swelling, denies SOB, chest tightness, syncope. Unable to identify trigger. Will obtain labs to help further identify potential cause, advising cetirizine 10mg QD. Patient has epipen at home, all members of family know where they are located and how to use them.     Patient also has h/o bipolar disorder, previously managed w lamotrigine. Patient has not taken this med in many months, and states that when taking it, it minimally helps w symptoms of easy anger, emotional, depressed thoughts. Willing to try Duloxetine, will follow up in 1-2mo to assess improvement.      Review of Systems   Constitutional:  Negative for activity change, appetite change, chills, fatigue and fever.   HENT:  Positive for facial swelling. Negative for congestion, ear pain and sore throat.    Eyes:  Negative for pain and visual disturbance.   Respiratory:  Negative for apnea, cough, choking, chest tightness, shortness of breath, wheezing and stridor.    Cardiovascular:  Negative for chest pain and palpitations.   Gastrointestinal:  Negative for abdominal pain, constipation, diarrhea, nausea and vomiting.   Genitourinary:  Negative for dysuria and hematuria.   Musculoskeletal:  Negative for arthralgias and back pain.   Skin:  Positive for rash. Negative for color change.   Neurological:  Negative for dizziness, seizures, syncope, facial asymmetry, light-headedness, numbness and headaches.   Psychiatric/Behavioral:  Positive for agitation and dysphoric mood. Negative for confusion and sleep disturbance. The patient is not nervous/anxious.    All other systems reviewed and are negative.    Objective     LMP  07/01/2024 (Within Months)   Breastfeeding No   Physical Exam  Constitutional:       Appearance: Normal appearance.   HENT:      Head: Atraumatic.      Mouth/Throat:      Pharynx: Oropharynx is clear.      Comments: Bottom lip mildly swollen   Pulmonary:      Effort: Pulmonary effort is normal.   Skin:     Findings: Rash present.      Comments: Mildly erythematous raise patches appreciated on arms bilaterally in various sizes   Neurological:      Mental Status: She is alert.   Psychiatric:         Mood and Affect: Mood normal.         Behavior: Behavior normal.         Visit Time  Total Visit Duration: 24min

## 2024-08-21 ENCOUNTER — TELEPHONE (OUTPATIENT)
Dept: FAMILY MEDICINE CLINIC | Facility: CLINIC | Age: 34
End: 2024-08-21

## 2024-08-21 NOTE — TELEPHONE ENCOUNTER
Pt called states she was to inform you when she has allergic flare up.  Stated she would like to speak with you as she is having flare up.  Please call pt at [phone]  ty

## 2024-08-28 ENCOUNTER — APPOINTMENT (OUTPATIENT)
Dept: LAB | Facility: CLINIC | Age: 34
End: 2024-08-28
Payer: COMMERCIAL

## 2024-08-28 DIAGNOSIS — L50.9 HIVES: Primary | ICD-10-CM

## 2024-08-28 DIAGNOSIS — T78.40XA ALLERGY, INITIAL ENCOUNTER: ICD-10-CM

## 2024-08-28 LAB — C4 SERPL-MCNC: 30 MG/DL (ref 19–52)

## 2024-08-28 PROCEDURE — 86003 ALLG SPEC IGE CRUDE XTRC EA: CPT

## 2024-08-28 PROCEDURE — 86160 COMPLEMENT ANTIGEN: CPT

## 2024-08-28 PROCEDURE — 82785 ASSAY OF IGE: CPT

## 2024-08-28 PROCEDURE — 36415 COLL VENOUS BLD VENIPUNCTURE: CPT

## 2024-08-29 ENCOUNTER — TELEPHONE (OUTPATIENT)
Dept: FAMILY MEDICINE CLINIC | Facility: CLINIC | Age: 34
End: 2024-08-29

## 2024-08-29 LAB
A ALTERNATA IGE QN: <0.1 KUA/I
A FUMIGATUS IGE QN: <0.1 KUA/I
BERMUDA GRASS IGE QN: 0.51 KUA/I
BOXELDER IGE QN: 0.46 KUA/I
C HERBARUM IGE QN: <0.1 KUA/I
CAT DANDER IGE QN: <0.1 KUA/I
CMN PIGWEED IGE QN: 0.33 KUA/I
COMMON RAGWEED IGE QN: 0.43 KUA/I
COTTONWOOD IGE QN: 0.34 KUA/I
D FARINAE IGE QN: <0.1 KUA/I
D PTERONYSS IGE QN: <0.1 KUA/I
DOG DANDER IGE QN: <0.1 KUA/I
LONDON PLANE IGE QN: 0.43 KUA/I
MOUSE URINE PROT IGE QN: <0.1 KUA/I
MT JUNIPER IGE QN: 0.35 KUA/I
MUGWORT IGE QN: 0.29 KUA/I
P NOTATUM IGE QN: <0.1 KUA/I
ROACH IGE QN: 0.31 KUA/I
SHEEP SORREL IGE QN: 0.4 KUA/I
SILVER BIRCH IGE QN: 0.28 KUA/I
TIMOTHY IGE QN: 0.51 KUA/I
TOTAL IGE SMQN RAST: 36 KU/L (ref 0–113)
WALNUT IGE QN: 0.46 KUA/I
WHITE ASH IGE QN: 0.5 KUA/I
WHITE ELM IGE QN: 0.44 KUA/I
WHITE MULBERRY IGE QN: 0.24 KUA/I
WHITE OAK IGE QN: 0.43 KUA/I

## 2024-08-29 NOTE — TELEPHONE ENCOUNTER
Pt called, regarding recent bloodwork.  Requested call back to discuss results.   Please call pt to discuss  Thank you.

## 2024-08-30 LAB — C1INH SERPL-MCNC: 27 MG/DL (ref 21–39)

## 2024-09-02 PROBLEM — T78.40XA ALLERGIES: Status: ACTIVE | Noted: 2024-09-02

## 2024-09-02 NOTE — ASSESSMENT & PLAN NOTE
More frequent episodes of hives, swelling of lips  Very itchy  Unclear trigger  Would like testing to help find source  Agreeable to lab work  Consider allergy specialist referral   Continue to monitor

## 2024-09-02 NOTE — ASSESSMENT & PLAN NOTE
Previously managed w Lamictal  Patient reports minimal improvement of symptoms when on this med  Has not taken Lamictal in several months  Agreeable to trying Duloxetine   Will continue to monitor  F/u 1-2mo

## 2024-09-03 DIAGNOSIS — F31.61 BIPOLAR DISORDER, CURRENT EPISODE MIXED, MILD (HCC): ICD-10-CM

## 2024-09-03 RX ORDER — DULOXETIN HYDROCHLORIDE 20 MG/1
20 CAPSULE, DELAYED RELEASE ORAL DAILY
Qty: 30 CAPSULE | Refills: 0 | Status: SHIPPED | OUTPATIENT
Start: 2024-09-03

## 2024-09-06 ENCOUNTER — TELEMEDICINE (OUTPATIENT)
Dept: FAMILY MEDICINE CLINIC | Facility: CLINIC | Age: 34
End: 2024-09-06

## 2024-09-06 DIAGNOSIS — Z91.199 NO-SHOW FOR APPOINTMENT: Primary | ICD-10-CM

## 2024-09-06 NOTE — PROGRESS NOTES
Virtual Regular Visit  Name: Rafaela Peres      : 1990      MRN: [id number]  Encounter Provider: Marisol Solis PA-C  Encounter Date: 2024   Encounter department: [facility]    Verification of patient location:    Patient is located at Home in the following state in which I hold an active license [place]    Assessment & Plan   {There are no diagnoses linked to this encounter. (Refresh or delete this SmartLink)}       Encounter provider Marisol Solis PA-C    The patient was identified by name and date of birth. Rafaela Peres was informed that this is a telemedicine visit and that the visit is being conducted through the [employer] platform. She agrees to proceed..  My office door was closed. No one else was in the room.  She acknowledged consent and understanding of privacy and security of the video platform. The patient has agreed to participate and understands they can discontinue the visit at any time.    Patient is aware this is a billable service.     History of Present Illness   {Disappearing Hyperlinks I Encounters * My Last Note * Since Last Visit * History :03747}  HPI    Review of Systems  Medical History Reviewed by provider this encounter:       Objective   {Disappearing Hyperlinks   Review Vitals * Enter New Vitals * Results Review * Labs * Imaging * Cardiology * Procedures * Lung Cancer Screening :68938}  There were no vitals taken for this visit.  Physical Exam    Visit Time  Total Visit Duration: 15

## 2024-09-10 ENCOUNTER — TELEMEDICINE (OUTPATIENT)
Dept: FAMILY MEDICINE CLINIC | Facility: CLINIC | Age: 34
End: 2024-09-10
Payer: COMMERCIAL

## 2024-09-10 ENCOUNTER — TELEPHONE (OUTPATIENT)
Age: 34
End: 2024-09-10

## 2024-09-10 DIAGNOSIS — F31.61 BIPOLAR DISORDER, CURRENT EPISODE MIXED, MILD (HCC): Primary | ICD-10-CM

## 2024-09-10 PROBLEM — O30.049 DICHORIONIC DIAMNIOTIC TWIN PREGNANCY: Status: RESOLVED | Noted: 2023-06-01 | Resolved: 2024-09-10

## 2024-09-10 PROCEDURE — 99213 OFFICE O/P EST LOW 20 MIN: CPT

## 2024-09-10 NOTE — ASSESSMENT & PLAN NOTE
Previously managed w Lamictal, minimal improvement  Agreeable to trying Duloxetine   Will continue to monitor  Consider switching to anti-psychotic for first line management  Will refer to behavioral therapy  Consider inpatient if symptoms worsen  F/u2-3days

## 2024-09-10 NOTE — PROGRESS NOTES
Virtual Regular Visit  Name: Rafaela Peres      : 1990      MRN: [id number]  Encounter Provider: Saray King MD  Encounter Date: 9/10/2024   Encounter department: [facility]    Verification of patient location:    Patient is located at Home in the following state in which I hold an active license [place]    Assessment & Plan  Bipolar disorder, current episode mixed, mild (HCC)  Previously managed w Lamictal, minimal improvement  Agreeable to trying Duloxetine   Will continue to monitor  Consider switching to anti-psychotic for first line management  Will refer to behavioral therapy  Consider inpatient if symptoms worsen  F/u2-3days              Encounter provider Saray King MD    The patient was identified by name and date of birth. Rafaela Peres was informed that this is a telemedicine visit and that the visit is being conducted through the Epic Embedded platform. She agrees to proceed..  My office door was closed. No one else was in the room.  She acknowledged consent and understanding of privacy and security of the video platform. The patient has agreed to participate and understands they can discontinue the visit at any time.    Patient is aware this is a billable service.     History of Present Illness     Ms Shahbaz Peres is being seen by telemed because of worsening depressive symptoms. Patient has a PMH of Bipolar disorder, was previously managed w Lamictal, but the patient did not like taking this med, and stated in only minimally helped with symptoms. We had switched to duloxetine w caution regarding possible instigation of tyson. Patient has not been consistently taking this med. Has had conflict w family members that have been taking a toll on her mental health. Noted to put her own needs aside to care for her children. Tearful on exam. Does not necessarily want inpatient psych, but is open to the idea if symptoms worsen. No  SI/HI. Agreeable to emergency referral to behavioral therapy. Also discussed possible family therapy in the future. Encouraged to continue taking meds, will reassess the need to switch to an antipsychotic in the future. Will follow up in 2-3 days, but advised to call if symptoms worsen          Review of Systems   Constitutional:  Positive for activity change and fatigue. Negative for chills and fever.   HENT:  Negative for congestion, ear pain, postnasal drip, rhinorrhea and sore throat.    Eyes:  Negative for pain and visual disturbance.   Respiratory:  Negative for cough, shortness of breath and wheezing.    Cardiovascular:  Negative for chest pain and palpitations.   Gastrointestinal:  Negative for abdominal pain, constipation, diarrhea, nausea and vomiting.   Genitourinary:  Negative for dysuria and hematuria.   Musculoskeletal:  Negative for arthralgias and back pain.   Skin:  Negative for color change and rash.   Neurological:  Negative for seizures, syncope and headaches.   Psychiatric/Behavioral:  Positive for agitation, dysphoric mood and sleep disturbance. Negative for confusion, hallucinations, self-injury and suicidal ideas. The patient is nervous/anxious. The patient is not hyperactive.    All other systems reviewed and are negative.        Objective     LMP  (LMP Unknown)   Breastfeeding No   Physical Exam  Constitutional:       General: She is not in acute distress.     Appearance: Normal appearance.   HENT:      Head: Atraumatic.      Mouth/Throat:      Pharynx: Oropharynx is clear.   Eyes:      Extraocular Movements: Extraocular movements intact.   Pulmonary:      Effort: Pulmonary effort is normal.   Musculoskeletal:      Cervical back: Normal range of motion.   Neurological:      Mental Status: She is alert and oriented to person, place, and time.   Psychiatric:         Behavior: Behavior normal.      Comments: Tearful at times         Visit Time  Total Visit Duration: 42min

## 2024-09-10 NOTE — TELEPHONE ENCOUNTER
Contacted patient in regards to [facility]AP[facility] in attempts to verify patient's needs of services and schedule soonest available appointment. Pt was having a hard time hearing me and vice versa, phone call was disconnected. Pt seemed to be looking for a provider in [place].     TT;MIKE

## 2024-09-10 NOTE — TELEPHONE ENCOUNTER
"Behavioral Health Outpatient Intake Questions    Referred By   :     Please advise interviewee that they need to answer all questions truthfully to allow for best care, and any misrepresentations of information may affect their ability to be seen at this clinic   => Was this discussed? Yes     If Minor Child (under age 18)    Who is/are the legal guardian(s) of the child?     Is there a custody agreement? No     If \"YES\"- Custody orders must be obtained prior to scheduling the first appointment  In addition, Consent to Treatment must be signed by all legal guardians prior to scheduling the first appointment    If \"NO\"- Consent to Treatment must be signed by all legal guardians prior to scheduling the first appointment    Behavioral Health Outpatient Intake History -     Presenting Problem (in patient's own words): Hasn't had therapy in a long time and feels like she needs it.     Are there any communication barriers for this patient?     Yes                                               If yes, please describe barriers: ADHD and learning disability,PTSD,ODD, Borderline personality disorder, Depression  If there is a unique situation, please refer to Dinh Preston/Pushpa Escalera for final determination.    Are you taking any psychiatric medications? Yes     If \"YES\" -What are they Listed on file, pt does not know off the top of her head     If \"YES\" -Who prescribes? PCP    Has the Patient previously received outpatient Talk Therapy or Medication Management from [facility]  Yes        If \"YES\"- When, Where and with Whom? 5/6 Years ago - [facility]         If \"NO\" -Has Patient received these services elsewhere?       If \"YES\" -When, Where, and with Whom?    Has the Patient abused alcohol or other substances in the last 6 months ? No  No concerns of substance abuse are reported.     If \"YES\" -What substance, How much, How often?     If illegal substance: Refer to [facility] (for LAN) or [facility].   If " "Alcohol in excess of 10 drinks per week:  Refer to [facility] (for LAN) or [facility] Off[facility]    Legal History-     Is this treatment court ordered? No   If \"yes \"send to :  Talk Therapy : Send to Dinh Preston/Pushpa Escalera for final determination   Med Management: Send to Dr Damon for final determination     Has the Patient been convicted of a felony?  No   If \"Yes\" send to -When, What?  Talk Therapy: Send to Dinh Escalera for final determination   Med Management: Send to Dr Damon for final determination     ACCEPTED as a patient Yes  If \"Yes\" Appointment Date:     Referred Elsewhere? No  If “Yes” - (Where? Ex: [facility], [facility], [facility], [facility], etc.)       Name of Insurance Co:[employer] [facility]   Insurance ID#[id number]   Insurance Phone #  If ins is primary or secondary?  If patient is a minor, parents information such as Name, D.O.B of guarantor.  "

## 2024-09-10 NOTE — TELEPHONE ENCOUNTER
After review of referral, pt is looking to be placed with Josefina Avery at the [facility]. Referral was forwarded to her team to handle; closing referral.

## 2024-09-17 LAB — MISCELLANEOUS LAB TEST RESULT: NORMAL

## 2024-09-23 ENCOUNTER — CONSULT (OUTPATIENT)
Dept: CARDIOLOGY CLINIC | Facility: CLINIC | Age: 34
End: 2024-09-23
Payer: COMMERCIAL

## 2024-09-23 ENCOUNTER — CLINICAL SUPPORT (OUTPATIENT)
Dept: CARDIOLOGY CLINIC | Facility: CLINIC | Age: 34
End: 2024-09-23
Payer: COMMERCIAL

## 2024-09-23 VITALS
HEIGHT: 62 IN | HEART RATE: 84 BPM | RESPIRATION RATE: 18 BRPM | DIASTOLIC BLOOD PRESSURE: 68 MMHG | SYSTOLIC BLOOD PRESSURE: 114 MMHG | OXYGEN SATURATION: 96 % | WEIGHT: 207 LBS | BODY MASS INDEX: 38.09 KG/M2

## 2024-09-23 DIAGNOSIS — R55 SYNCOPE, UNSPECIFIED SYNCOPE TYPE: ICD-10-CM

## 2024-09-23 DIAGNOSIS — R07.9 CHEST PAIN, UNSPECIFIED TYPE: ICD-10-CM

## 2024-09-23 DIAGNOSIS — R55 SYNCOPE, UNSPECIFIED SYNCOPE TYPE: Primary | ICD-10-CM

## 2024-09-23 DIAGNOSIS — R00.2 PALPITATIONS: Primary | ICD-10-CM

## 2024-09-23 PROCEDURE — 93000 ELECTROCARDIOGRAM COMPLETE: CPT | Performed by: INTERNAL MEDICINE

## 2024-09-23 PROCEDURE — 99204 OFFICE O/P NEW MOD 45 MIN: CPT | Performed by: INTERNAL MEDICINE

## 2024-09-23 PROCEDURE — 93246 EXT ECG>7D<15D RECORDING: CPT | Performed by: INTERNAL MEDICINE

## 2024-09-23 RX ORDER — ERGOCALCIFEROL 1.25 MG/1
50000 CAPSULE ORAL WEEKLY
COMMUNITY
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
## 2024-09-23 NOTE — PROGRESS NOTES
Left pectoral area prepped and Zio placed without incident.  Patient counseled on the Do's and Don'ts of the device.  Patient understanding and agreeable with the removal and return of device.    Serial#  [id number]

## 2024-09-27 ENCOUNTER — TELEPHONE (OUTPATIENT)
Age: 34
End: 2024-09-27

## 2024-09-27 NOTE — TELEPHONE ENCOUNTER
Patient's sister called for ASAP appointment for ongoing issue patient has with boils. States patient has them in multiple locations. Told patient, per Elaine, to come 10/02 at 11 a.m. to see Divya

## 2024-09-30 DIAGNOSIS — Z83.1 FAMILY HISTORY OF PINWORM INFECTION: Primary | ICD-10-CM

## 2024-09-30 RX ORDER — ALBENDAZOLE 200 MG/1
TABLET, FILM COATED ORAL
Qty: 4 TABLET | Refills: 0 | Status: SHIPPED | OUTPATIENT
Start: 2024-09-30 | End: 2024-10-14

## 2024-10-08 ENCOUNTER — HOSPITAL ENCOUNTER (OUTPATIENT)
Dept: NON INVASIVE DIAGNOSTICS | Facility: HOSPITAL | Age: 34
Discharge: HOME | End: 2024-10-08
Attending: INTERNAL MEDICINE
Payer: COMMERCIAL

## 2024-10-08 DIAGNOSIS — R07.9 CHEST PAIN, UNSPECIFIED TYPE: ICD-10-CM

## 2024-10-08 DIAGNOSIS — R55 SYNCOPE, UNSPECIFIED SYNCOPE TYPE: ICD-10-CM

## 2024-10-08 LAB
MAX HR PERCENT: 86 %
MAX HR: 164 BPM
RATE PRESSURE PRODUCT: NORMAL
SL CV STRESS RECOVERY BP: NORMAL MMHG
SL CV STRESS RECOVERY HR: 109 BPM
SL CV STRESS RECOVERY O2 SAT: 97 %
SL CV STRESS STAGE REACHED: 2
STRESS BASELINE BP: NORMAL MMHG
STRESS BASELINE HR: 106 BPM
STRESS O2 SAT REST: 99 %
STRESS PEAK HR: 164 BPM
STRESS POST ESTIMATED WORKLOAD: 7 METS
STRESS POST EXERCISE DUR MIN: 6 MIN
STRESS POST EXERCISE DUR SEC: 0 SEC
STRESS POST O2 SAT PEAK: 98 %
STRESS POST PEAK BP: 178 MMHG

## 2024-10-08 PROCEDURE — 93018 CV STRESS TEST I&R ONLY: CPT | Performed by: INTERNAL MEDICINE

## 2024-10-08 PROCEDURE — 93017 CV STRESS TEST TRACING ONLY: CPT

## 2024-10-08 PROCEDURE — 93016 CV STRESS TEST SUPVJ ONLY: CPT | Performed by: INTERNAL MEDICINE

## 2024-10-15 ENCOUNTER — TELEMEDICINE (OUTPATIENT)
Dept: FAMILY MEDICINE CLINIC | Facility: CLINIC | Age: 34
End: 2024-10-15
Payer: COMMERCIAL

## 2024-10-15 DIAGNOSIS — G43.009 MIGRAINE WITHOUT AURA AND WITHOUT STATUS MIGRAINOSUS, NOT INTRACTABLE: ICD-10-CM

## 2024-10-15 DIAGNOSIS — J45.20 MILD INTERMITTENT ASTHMA WITHOUT COMPLICATION: ICD-10-CM

## 2024-10-15 DIAGNOSIS — B97.89 ACUTE VIRAL SINUSITIS: Primary | ICD-10-CM

## 2024-10-15 DIAGNOSIS — J01.90 ACUTE VIRAL SINUSITIS: Primary | ICD-10-CM

## 2024-10-15 PROCEDURE — 99214 OFFICE O/P EST MOD 30 MIN: CPT

## 2024-10-15 RX ORDER — METHYLPREDNISOLONE 4 MG/1
TABLET ORAL
Qty: 21 EACH | Refills: 0 | Status: SHIPPED | OUTPATIENT
Start: 2024-10-15

## 2024-10-15 RX ORDER — ALBUTEROL SULFATE 90 UG/1
2 INHALANT RESPIRATORY (INHALATION) EVERY 6 HOURS PRN
Qty: 18 G | Refills: 1 | Status: SHIPPED | OUTPATIENT
Start: 2024-10-15

## 2024-10-15 NOTE — PROGRESS NOTES
Ambulatory Visit  Name: Rafaela Peres      : 1990      MRN: [id number]  Encounter Provider: Luis Eduardo Euceda DO  Encounter Date: 10/15/2024   Encounter department: [facility]    Assessment & Plan  Acute viral sinusitis  Exposure to 4-year-old child with hand-foot-and-mouth disease.  Continue symptomatic management, including over the counter cough and cold medicine, throat lozenges, and plenty of fluids.  Come to office in a week if symptoms do not resolve or sooner if acutely worsening.       Migraine without aura and without status migrainosus, not intractable  Infrequent (2-3x/year)  Will prescribe steroids as abortive therapy, as this has worked for her before  Orders:    methylPREDNISolone 4 MG tablet therapy pack; Use as directed on package    Mild intermittent asthma without complication  Assess severity on follow-up    Orders:    albuterol (PROVENTIL HFA,VENTOLIN HFA) 90 mcg/act inhaler; Inhale 2 puffs every 6 (six) hours as needed for wheezing or shortness of breath       History of Present Illness     HPI  Patient presents via telemedecine for c/c of sore throat, migraine, stuffy nose, and subjective fever.   Onset: Saturday  Description: Whole head hurts, can't describe it  Exacerbated by light and noise  Not Relieved by Tylenol PM/IR/ER, mucinex, ibuprofen, dayquil, or lozenges. Flonase gives her nausea  She reports a history of migraines: She gets 2-3 a year. Never was prescribed abortive meds      Review of Systems   Constitutional:  Positive for chills, fatigue and fever.   HENT:  Positive for congestion, rhinorrhea, sore throat and voice change.    Respiratory:  Positive for cough.    Gastrointestinal:  Positive for abdominal pain, diarrhea, nausea and vomiting. Negative for constipation.   Musculoskeletal:  Positive for myalgias.       VIRTUAL CARE DOCUMENTATION:     1. This service was provided via Telemedicine using [employer]     2. Parties in  the room with patient during teleconsult Family member: children     3. Confidentiality My office door was closed     4. Participants No one else was in the room    5. Patient acknowledged consent and understanding of privacy and security of the  Telemedicine consult. I informed the patient that I have reviewed their record in Epic and presented the opportunity for them to ask any questions regarding the visit today.  The patient agreed to participate.    6. Time spent: 30 minutes

## 2024-10-15 NOTE — ASSESSMENT & PLAN NOTE
Infrequent (2-3x/year)  Will prescribe steroids as abortive therapy, as this has worked for her before  Orders:    methylPREDNISolone 4 MG tablet therapy pack; Use as directed on package

## 2024-10-15 NOTE — ASSESSMENT & PLAN NOTE
Exposure to 4-year-old child with hand-foot-and-mouth disease.  Continue symptomatic management, including over the counter cough and cold medicine, throat lozenges, and plenty of fluids.  Come to office in a week if symptoms do not resolve or sooner if acutely worsening.

## 2024-10-15 NOTE — ASSESSMENT & PLAN NOTE
Assess severity on follow-up    Orders:    albuterol (PROVENTIL HFA,VENTOLIN HFA) 90 mcg/act inhaler; Inhale 2 puffs every 6 (six) hours as needed for wheezing or shortness of breath

## 2024-10-22 ENCOUNTER — TELEMEDICINE (OUTPATIENT)
Dept: FAMILY MEDICINE CLINIC | Facility: CLINIC | Age: 34
End: 2024-10-22
Payer: COMMERCIAL

## 2024-10-22 DIAGNOSIS — J06.9 UPPER RESPIRATORY TRACT INFECTION, UNSPECIFIED TYPE: Primary | ICD-10-CM

## 2024-10-22 PROCEDURE — 99213 OFFICE O/P EST LOW 20 MIN: CPT

## 2024-10-22 RX ORDER — AMOXICILLIN 500 MG/1
500 TABLET, FILM COATED ORAL 2 TIMES DAILY
Qty: 14 TABLET | Refills: 0 | Status: SHIPPED | OUTPATIENT
Start: 2024-10-22 | End: 2024-10-29

## 2024-10-22 NOTE — PROGRESS NOTES
Virtual Regular Visit  Name: Rafaela Peres      : 1990      MRN: [id number]  Encounter Provider: Saray King MD  Encounter Date: 10/22/2024   Encounter department: [facility]    Verification of patient location:    Patient is located at Home in the following state in which I hold an active license [place]    Assessment & Plan  Upper respiratory tract infection, unspecified type                Encounter provider Saray King MD    The patient was identified by name and date of birth. Rafaela Peres was informed that this is a telemedicine visit and that the visit is being conducted through the Epic Embedded platform. She agrees to proceed..  My office door was closed. No one else was in the room.  She acknowledged consent and understanding of privacy and security of the video platform. The patient has agreed to participate and understands they can discontinue the visit at any time.    Patient is aware this is a billable service.     History of Present Illness     Ms Shahbaz Peres is being seen via telemed because of ongoing URI symptoms. Patients children have been sick w similar symptoms. Given duration, likely bacterial infection. Subjective fevers, chills, aches, fatigue, with cough, congestion, sore throat. Will prescribe course of antibiotics for management.       History obtained from : patient  Review of Systems   Constitutional:  Positive for activity change, chills and fatigue. Negative for fever.   HENT:  Positive for congestion, postnasal drip, rhinorrhea, sinus pressure and sore throat. Negative for ear pain.    Eyes:  Negative for pain and visual disturbance.   Respiratory:  Positive for cough. Negative for chest tightness, shortness of breath and wheezing.    Cardiovascular:  Negative for chest pain and palpitations.   Gastrointestinal:  Negative for abdominal pain, constipation, diarrhea, nausea and vomiting.   Genitourinary:   Negative for dysuria and hematuria.   Musculoskeletal:  Negative for arthralgias and back pain.   Skin:  Negative for color change and rash.   Neurological:  Positive for headaches. Negative for dizziness, seizures, syncope and weakness.   Psychiatric/Behavioral:  Positive for sleep disturbance. Negative for agitation and confusion. The patient is not nervous/anxious.    All other systems reviewed and are negative.    Current Outpatient Medications on File Prior to Visit   Medication Sig Dispense Refill    albuterol (PROVENTIL HFA,VENTOLIN HFA) 90 mcg/act inhaler Inhale 2 puffs every 6 (six) hours as needed for wheezing or shortness of breath 18 g 1    cetirizine (ZyrTEC) 10 mg tablet Take 1 tablet (10 mg total) by mouth daily 30 tablet 2    EPINEPHrine (EPIPEN) 0.3 mg/0.3 mL SOAJ Inject 0.3 mL (0.3 mg total) into a muscle once for 1 dose 0.6 mL 1     No current facility-administered medications on file prior to visit.      Social History     Tobacco Use    Smoking status: Some Days     Current packs/day: 0.25     Average packs/day: 0.3 packs/day for 16.9 years (4.2 ttl pk-yrs)     Types: Cigarettes     Start date: 2008     Passive exposure: Current    Smokeless tobacco: Never   Vaping Use    Vaping status: Never Used   Substance and Sexual Activity    Alcohol use: Not Currently     Comment: social    Drug use: Yes     Types: Marijuana     Comment: Medical card    Sexual activity: Yes     Partners: Male     Birth control/protection: None         Objective     There were no vitals taken for this visit.  Physical Exam  Constitutional:       General: She is not in acute distress.     Appearance: She is ill-appearing.   HENT:      Head: Atraumatic.      Mouth/Throat:      Pharynx: Oropharynx is clear.   Eyes:      Extraocular Movements: Extraocular movements intact.   Pulmonary:      Effort: Pulmonary effort is normal.      Comments: Nasal sounding voice, occasional cough  Musculoskeletal:         General: Normal range  of motion.      Cervical back: Normal range of motion.   Neurological:      Mental Status: She is alert and oriented to person, place, and time.   Psychiatric:         Mood and Affect: Mood normal.         Behavior: Behavior normal.         Visit Time  Total Visit Duration: 27min

## 2024-10-24 DIAGNOSIS — F31.61 BIPOLAR DISORDER, CURRENT EPISODE MIXED, MILD (HCC): ICD-10-CM

## 2024-10-25 RX ORDER — DULOXETIN HYDROCHLORIDE 20 MG/1
20 CAPSULE, DELAYED RELEASE ORAL DAILY
Qty: 30 CAPSULE | Refills: 0 | Status: SHIPPED | OUTPATIENT
Start: 2024-10-25

## 2024-10-29 ENCOUNTER — OFFICE VISIT (OUTPATIENT)
Dept: FAMILY MEDICINE CLINIC | Facility: CLINIC | Age: 34
End: 2024-10-29
Payer: COMMERCIAL

## 2024-10-29 VITALS
BODY MASS INDEX: 37.36 KG/M2 | SYSTOLIC BLOOD PRESSURE: 122 MMHG | HEIGHT: 62 IN | DIASTOLIC BLOOD PRESSURE: 84 MMHG | HEART RATE: 81 BPM | RESPIRATION RATE: 18 BRPM | OXYGEN SATURATION: 98 % | WEIGHT: 203 LBS

## 2024-10-29 DIAGNOSIS — R47.01 APHASIA: ICD-10-CM

## 2024-10-29 DIAGNOSIS — T78.40XS ALLERGY, SEQUELA: ICD-10-CM

## 2024-10-29 DIAGNOSIS — F31.61 BIPOLAR DISORDER, CURRENT EPISODE MIXED, MILD (HCC): Primary | ICD-10-CM

## 2024-10-29 DIAGNOSIS — Z91.018 MULTIPLE FOOD ALLERGIES: ICD-10-CM

## 2024-10-29 PROCEDURE — 99213 OFFICE O/P EST LOW 20 MIN: CPT

## 2024-10-29 RX ORDER — DULOXETIN HYDROCHLORIDE 60 MG/1
60 CAPSULE, DELAYED RELEASE ORAL DAILY
Qty: 30 CAPSULE | Refills: 2 | Status: SHIPPED | OUTPATIENT
Start: 2024-10-29 | End: 2024-12-04

## 2024-10-29 NOTE — ASSESSMENT & PLAN NOTE
Mixing her words up  Trouble finding words  Likely stress reaction vs other neuro etiology  Patient requesting neuro consult for further eval

## 2024-10-29 NOTE — PROGRESS NOTES
Assessment/Plan:    Aphasia  Mixing her words up  Trouble finding words  Likely stress reaction vs other neuro etiology  Patient requesting neuro consult for further eval    Bipolar disorder, current episode mixed, mild (HCC)  Increasing Duloxetine to 60mg QD  Continue to try and establish w therapy    Allergies  Continue Zyrtec daily  Patient would like further discussion w allergist  Will place referral          Diagnoses and all orders for this visit:    Bipolar disorder, current episode mixed, mild (HCC)  -     DULoxetine (CYMBALTA) 60 mg delayed release capsule; Take 1 capsule (60 mg total) by mouth daily    Allergy, sequela  -     Ambulatory Referral to Allergy; Future    Multiple food allergies  -     Ambulatory Referral to Allergy; Future    Aphasia  -     Ambulatory Referral to Neurology; Future    ?      Subjective:      Patient ID: Rafaela Peres is a 33 y.o. female.    Ms Peres is being seen in office regarding ongoing mood symptoms. We are agreeable to an increase in Duloxetine to address these symptoms, and are continue to try and establish with a therapist. Patient would also like referrals to allergist and neurologist, which we are agreeable to. Continue to monitor         The following portions of the patient's history were reviewed and updated as appropriate: allergies, current medications, past family history, past medical history, past social history, past surgical history, and problem list.    Review of Systems   Constitutional:  Positive for fatigue. Negative for activity change, chills and fever.   HENT:  Negative for congestion, ear pain and sore throat.    Eyes:  Negative for pain and visual disturbance.   Respiratory:  Negative for cough and shortness of breath.    Cardiovascular:  Negative for chest pain and palpitations.   Gastrointestinal:  Negative for abdominal pain, constipation, diarrhea, nausea and vomiting.   Genitourinary:  Negative for dysuria and hematuria.  "  Musculoskeletal:  Negative for arthralgias and back pain.   Skin:  Negative for color change and rash.   Neurological:  Negative for dizziness, seizures, syncope, weakness and headaches.   Psychiatric/Behavioral:  Positive for decreased concentration, dysphoric mood and sleep disturbance. Negative for agitation, behavioral problems, confusion and self-injury. The patient is nervous/anxious.    All other systems reviewed and are negative.        Objective:      /84 (BP Location: Left arm, Patient Position: Sitting, Cuff Size: Large)   Pulse 81   Resp 18   Ht 5' 2\" (1.575 m)   Wt 92.1 kg (203 lb)   LMP  (LMP Unknown)   SpO2 98%   BMI 37.13 kg/m²          Physical Exam  Vitals and nursing note reviewed.   Constitutional:       General: She is not in acute distress.     Appearance: Normal appearance. She is obese. She is not ill-appearing.   HENT:      Head: Normocephalic and atraumatic.      Right Ear: External ear normal.      Left Ear: External ear normal.      Nose: Nose normal.      Mouth/Throat:      Mouth: Mucous membranes are moist.      Pharynx: Oropharynx is clear.   Eyes:      Extraocular Movements: Extraocular movements intact.      Conjunctiva/sclera: Conjunctivae normal.   Cardiovascular:      Rate and Rhythm: Normal rate and regular rhythm.      Pulses: Normal pulses.      Heart sounds: Normal heart sounds. No murmur heard.  Pulmonary:      Effort: Pulmonary effort is normal.      Breath sounds: Normal breath sounds. No wheezing.   Abdominal:      General: Abdomen is flat. Bowel sounds are normal.      Palpations: Abdomen is soft.      Tenderness: There is no abdominal tenderness. There is no guarding.   Musculoskeletal:         General: No swelling or deformity. Normal range of motion.      Cervical back: Normal range of motion and neck supple. No rigidity.   Skin:     General: Skin is warm and dry.      Findings: No erythema or rash.   Neurological:      General: No focal deficit present. "      Mental Status: She is alert and oriented to person, place, and time.   Psychiatric:         Mood and Affect: Mood normal.         Behavior: Behavior normal.

## 2024-10-30 ENCOUNTER — TELEMEDICINE (OUTPATIENT)
Dept: FAMILY MEDICINE CLINIC | Facility: CLINIC | Age: 34
End: 2024-10-30
Payer: COMMERCIAL

## 2024-10-30 DIAGNOSIS — A05.9 FOOD POISONING: Primary | ICD-10-CM

## 2024-10-30 PROCEDURE — 99213 OFFICE O/P EST LOW 20 MIN: CPT

## 2024-10-30 RX ORDER — ONDANSETRON 4 MG/1
4 TABLET, FILM COATED ORAL EVERY 6 HOURS
Qty: 30 TABLET | Refills: 0 | Status: SHIPPED | OUTPATIENT
Start: 2024-10-30

## 2024-10-30 NOTE — PROGRESS NOTES
Virtual Regular Visit  Name: Rafaela Peres      : 1990      MRN: [id number]  Encounter Provider: Saray King MD  Encounter Date: 10/30/2024   Encounter department: [facility]    Verification of patient location:    Patient is located at Home in the following state in which I hold an active license [place]    Assessment & Plan  Food poisoning    Orders:    ondansetron (ZOFRAN) 4 mg tablet; Take 1 tablet (4 mg total) by mouth every 6 (six) hours         Encounter provider Saray King MD    The patient was identified by name and date of birth. Rafaela Peres was informed that this is a telemedicine visit and that the visit is being conducted through the Epic Embedded platform. She agrees to proceed..  My office door was closed. No one else was in the room.  She acknowledged consent and understanding of privacy and security of the video platform. The patient has agreed to participate and understands they can discontinue the visit at any time.    Patient is aware this is a billable service.     History of Present Illness     Ms Shahbaz Peres was seen via telemed because of acute nausea, vomiting, and diarrhea. Patient was in usual state of health yesterday, and then had dinner of soup w meat in it w the rest of the family. Patients family members were then up throughout the night w watery diarrhea and nausea. Patients own symptoms started around 2am w vomiting and diarrhea. Today, bowel movements have slowed down, not yet formed, nausea still present, no longer vomiting, seems dehydrated, also w chills and objective fever. Advising rest, hydration, solid food as tolerated, tylenol for fever/chills. Symptoms expected to resolve w/n 24hr, RTO if not.           History obtained from : patient  Review of Systems   Constitutional:  Positive for activity change, appetite change, chills, fatigue and fever.   HENT:  Negative for congestion, postnasal drip,  rhinorrhea and sore throat.    Respiratory:  Negative for cough, shortness of breath and wheezing.    Cardiovascular:  Negative for chest pain.   Gastrointestinal:  Positive for diarrhea, nausea and vomiting. Negative for abdominal pain and constipation.   Genitourinary:  Negative for dysuria and flank pain.   Musculoskeletal:  Negative for arthralgias and myalgias.   Skin:  Negative for color change and rash.   Neurological:  Negative for dizziness, syncope, weakness and headaches.   Psychiatric/Behavioral:  Positive for sleep disturbance. Negative for agitation and confusion. The patient is not nervous/anxious.      Current Outpatient Medications on File Prior to Visit   Medication Sig Dispense Refill    albuterol (PROVENTIL HFA,VENTOLIN HFA) 90 mcg/act inhaler Inhale 2 puffs every 6 (six) hours as needed for wheezing or shortness of breath 18 g 1    cetirizine (ZyrTEC) 10 mg tablet Take 1 tablet (10 mg total) by mouth daily 30 tablet 2    DULoxetine (CYMBALTA) 60 mg delayed release capsule Take 1 capsule (60 mg total) by mouth daily 30 capsule 2    EPINEPHrine (EPIPEN) 0.3 mg/0.3 mL SOAJ Inject 0.3 mL (0.3 mg total) into a muscle once for 1 dose 0.6 mL 1     No current facility-administered medications on file prior to visit.      Social History     Tobacco Use    Smoking status: Some Days     Current packs/day: 0.25     Average packs/day: 0.3 packs/day for 16.8 years (4.2 ttl pk-yrs)     Types: Cigarettes     Start date: 2008     Passive exposure: Current    Smokeless tobacco: Never   Vaping Use    Vaping status: Never Used   Substance and Sexual Activity    Alcohol use: Not Currently     Comment: social    Drug use: Yes     Types: Marijuana     Comment: Medical card    Sexual activity: Yes     Partners: Male     Birth control/protection: None         Objective     LMP  (LMP Unknown)   Physical Exam  Constitutional:       Appearance: She is ill-appearing.   HENT:      Head: Atraumatic.      Mouth/Throat:       Mouth: Mucous membranes are dry.      Pharynx: Oropharynx is clear.   Eyes:      Extraocular Movements: Extraocular movements intact.   Pulmonary:      Effort: Pulmonary effort is normal.   Musculoskeletal:      Cervical back: Normal range of motion.   Neurological:      Mental Status: She is alert and oriented to person, place, and time.   Psychiatric:         Mood and Affect: Mood normal.         Behavior: Behavior normal.         Visit Time  Total Visit Duration: 31min

## 2024-11-11 ENCOUNTER — TELEPHONE (OUTPATIENT)
Dept: CARDIOLOGY CLINIC | Facility: CLINIC | Age: 34
End: 2024-11-11

## 2024-11-11 NOTE — TELEPHONE ENCOUNTER
Following Zio devices & tracking-     Patient had Zio placed on 9/24/24. Device is not finalized nor returned.     Patient states that she has the device but can't find the return box; she will call the company and see what options are available I provided her with the phone number

## 2024-11-12 DIAGNOSIS — H66.91 OTITIS OF RIGHT EAR: Primary | ICD-10-CM

## 2024-11-12 RX ORDER — AMOXICILLIN 500 MG/1
500 TABLET, FILM COATED ORAL 2 TIMES DAILY
Qty: 10 TABLET | Refills: 0 | Status: SHIPPED | OUTPATIENT
Start: 2024-11-12 | End: 2024-11-17

## 2024-11-14 PROBLEM — B97.89 ACUTE VIRAL SINUSITIS: Status: RESOLVED | Noted: 2024-10-15 | Resolved: 2024-11-14

## 2024-11-14 PROBLEM — J01.90 ACUTE VIRAL SINUSITIS: Status: RESOLVED | Noted: 2024-10-15 | Resolved: 2024-11-14

## 2024-11-19 ENCOUNTER — OFFICE VISIT (OUTPATIENT)
Dept: FAMILY MEDICINE CLINIC | Facility: CLINIC | Age: 34
End: 2024-11-19
Payer: COMMERCIAL

## 2024-11-19 VITALS
SYSTOLIC BLOOD PRESSURE: 129 MMHG | DIASTOLIC BLOOD PRESSURE: 64 MMHG | BODY MASS INDEX: 36.91 KG/M2 | TEMPERATURE: 98.4 F | HEIGHT: 62 IN | WEIGHT: 200.6 LBS | HEART RATE: 103 BPM | OXYGEN SATURATION: 97 % | RESPIRATION RATE: 18 BRPM

## 2024-11-19 DIAGNOSIS — F31.61 BIPOLAR DISORDER, CURRENT EPISODE MIXED, MILD (HCC): Primary | ICD-10-CM

## 2024-11-19 DIAGNOSIS — T78.40XA ALLERGY, INITIAL ENCOUNTER: ICD-10-CM

## 2024-11-19 DIAGNOSIS — F41.9 ANXIETY: ICD-10-CM

## 2024-11-19 PROCEDURE — 99213 OFFICE O/P EST LOW 20 MIN: CPT

## 2024-11-19 RX ORDER — HYDROXYZINE HYDROCHLORIDE 25 MG/1
25 TABLET, FILM COATED ORAL EVERY 6 HOURS PRN
Qty: 30 TABLET | Refills: 0 | Status: SHIPPED | OUTPATIENT
Start: 2024-11-19 | End: 2024-12-12 | Stop reason: SDUPTHER

## 2024-11-19 RX ORDER — CETIRIZINE HYDROCHLORIDE 10 MG/1
10 TABLET ORAL DAILY
Qty: 30 TABLET | Refills: 2 | Status: SHIPPED | OUTPATIENT
Start: 2024-11-19 | End: 2024-12-10 | Stop reason: SDUPTHER

## 2024-11-26 ENCOUNTER — TELEMEDICINE (OUTPATIENT)
Dept: FAMILY MEDICINE CLINIC | Facility: CLINIC | Age: 34
End: 2024-11-26
Payer: COMMERCIAL

## 2024-11-26 DIAGNOSIS — B00.1 COLD SORE: Primary | ICD-10-CM

## 2024-11-26 PROCEDURE — 99213 OFFICE O/P EST LOW 20 MIN: CPT

## 2024-11-26 RX ORDER — VALACYCLOVIR HYDROCHLORIDE 1 G/1
2000 TABLET, FILM COATED ORAL 2 TIMES DAILY
Qty: 120 TABLET | Refills: 0 | Status: SHIPPED | OUTPATIENT
Start: 2024-11-26 | End: 2024-12-26

## 2024-11-26 RX ORDER — DOCOSANOL 100 MG/G
CREAM TOPICAL
Qty: 2 G | Refills: 3 | Status: SHIPPED | OUTPATIENT
Start: 2024-11-26

## 2024-11-26 NOTE — PROGRESS NOTES
Virtual Regular Visit  Name: Rafaela Chiang      : 1990      MRN: [id number]  Encounter Provider: Saray King MD  Encounter Date: 2024   Encounter department: [facility]      Verification of patient location:  Patient is located at Home in the following state in which I hold an active license [place] :  Assessment & Plan  Cold sore  Known h/o cold sores  Appear when sick, stressed  Will prescribe both abreva for topical, and valtrex for management of outbreak  Orders:    valACYclovir (VALTREX) 1,000 mg tablet; Take 2 tablets (2,000 mg total) by mouth 2 (two) times a day Take 2 tablets, then 12 hours later, take another 2 tablets. That's the entire treatment course (1 day). Other 26 tablets may be saved in the home for future cold sore outbreaks.    docosanol (ABREVA) 10 %; Apply topically 5 (five) times a day    Cold sore               Encounter provider Saray King MD    The patient was identified by name and date of birth. Rafaela Chiang was informed that this is a telemedicine visit and that the visit is being conducted through the Epic Embedded platform. She agrees to proceed..  My office door was closed. No one else was in the room.  She acknowledged consent and understanding of privacy and security of the video platform. The patient has agreed to participate and understands they can discontinue the visit at any time.    Patient is aware this is a billable service.     History of Present Illness     Ms Peres is being seen via telemed regarding a recurrence of a cold sore on her lip. Patient has longstanding h/o cold sores that appear during times of illness or other stress. Otherwise in her usual state of health w no other acute concerns. Will prescribe both abreva for topical, and valtrex for management of outbreak      Review of Systems   Constitutional:  Negative for chills and fever.   HENT:  Negative for ear pain and sore throat.     Eyes:  Negative for pain and visual disturbance.   Respiratory:  Negative for cough and shortness of breath.    Cardiovascular:  Negative for chest pain and palpitations.   Gastrointestinal:  Negative for abdominal pain and vomiting.   Genitourinary:  Negative for dysuria and hematuria.   Musculoskeletal:  Negative for arthralgias and back pain.   Skin:  Negative for color change and rash.        Lip lesion, 1d history   Neurological:  Negative for seizures and syncope.   All other systems reviewed and are negative.      Objective   LMP  (LMP Unknown)     Physical Exam  Constitutional:       General: She is not in acute distress.     Appearance: Normal appearance. She is not ill-appearing.   HENT:      Head: Atraumatic.      Nose: Nose normal.      Mouth/Throat:      Pharynx: Oropharynx is clear.   Eyes:      Extraocular Movements: Extraocular movements intact.   Pulmonary:      Effort: Pulmonary effort is normal.   Musculoskeletal:         General: Normal range of motion.      Cervical back: Normal range of motion.   Neurological:      Mental Status: She is alert and oriented to person, place, and time.   Psychiatric:         Mood and Affect: Mood normal.         Behavior: Behavior normal.         Visit Time  Total Visit Duration: 12min

## 2024-12-04 ENCOUNTER — DOCUMENTATION (OUTPATIENT)
Dept: FAMILY MEDICINE CLINIC | Facility: CLINIC | Age: 34
End: 2024-12-04

## 2024-12-04 DIAGNOSIS — F31.61 BIPOLAR DISORDER, CURRENT EPISODE MIXED, MILD (HCC): Primary | ICD-10-CM

## 2024-12-04 PROBLEM — F41.9 ANXIETY: Status: ACTIVE | Noted: 2024-12-04

## 2024-12-04 RX ORDER — ARIPIPRAZOLE 10 MG/1
10 TABLET ORAL DAILY
Qty: 30 TABLET | Refills: 2 | Status: SHIPPED | OUTPATIENT
Start: 2024-12-04

## 2024-12-05 NOTE — PROGRESS NOTES
Assessment/Plan:     Diagnoses and all orders for this visit:    Bipolar disorder, current episode mixed, mild (HCC)    Allergy, initial encounter  -     cetirizine (ZyrTEC) 10 mg tablet; Take 1 tablet (10 mg total) by mouth daily    Anxiety  -     hydrOXYzine HCL (ATARAX) 25 mg tablet; Take 1 tablet (25 mg total) by mouth every 6 (six) hours as needed for itching    ?      Subjective:      Patient ID: Rafaela Peres is a 34 y.o. female.    Ms Peres is being seen regarding ongoing bipolar 2 depressive type symptoms. We will increase current med of duloxetine to 60mg QD and monitor closely for improvement. Will also prescribe atarax for use during episodes of heightened emotional response and feelings of anxiety.         The following portions of the patient's history were reviewed and updated as appropriate: allergies, current medications, past family history, past medical history, past social history, past surgical history, and problem list.    Review of Systems   Constitutional:  Negative for activity change, chills and fever.   HENT:  Positive for congestion, postnasal drip and sinus pressure. Negative for ear pain, rhinorrhea and sore throat.    Eyes:  Negative for pain and visual disturbance.   Respiratory:  Negative for cough and shortness of breath.    Cardiovascular:  Negative for chest pain and palpitations.   Gastrointestinal:  Negative for abdominal pain, constipation, diarrhea, nausea and vomiting.   Genitourinary:  Negative for dysuria and hematuria.   Musculoskeletal:  Negative for arthralgias and back pain.   Skin:  Negative for color change and rash.   Neurological:  Negative for seizures, syncope and headaches.   Psychiatric/Behavioral:  Positive for agitation, decreased concentration and dysphoric mood. Negative for self-injury, sleep disturbance and suicidal ideas. The patient is nervous/anxious.    All other systems reviewed and are negative.        Objective:      /64 (BP Location:  "Left arm, Patient Position: Sitting, Cuff Size: Large)   Pulse 103   Temp 98.4 °F (36.9 °C) (Tympanic)   Resp 18   Ht 5' 2\" (1.575 m)   Wt 91 kg (200 lb 9.6 oz)   LMP  (LMP Unknown)   SpO2 97%   BMI 36.69 kg/m²          Physical Exam  Vitals and nursing note reviewed.   Constitutional:       General: She is not in acute distress.     Appearance: Normal appearance. She is obese. She is not ill-appearing.   HENT:      Head: Normocephalic and atraumatic.      Right Ear: External ear normal.      Left Ear: External ear normal.      Nose: Nose normal.      Mouth/Throat:      Mouth: Mucous membranes are moist.      Pharynx: Oropharynx is clear.   Eyes:      Extraocular Movements: Extraocular movements intact.      Conjunctiva/sclera: Conjunctivae normal.   Cardiovascular:      Rate and Rhythm: Normal rate and regular rhythm.      Pulses: Normal pulses.      Heart sounds: Normal heart sounds. No murmur heard.  Pulmonary:      Effort: Pulmonary effort is normal.      Breath sounds: Normal breath sounds. No wheezing.   Abdominal:      General: Abdomen is flat. Bowel sounds are normal.      Palpations: Abdomen is soft.      Tenderness: There is no abdominal tenderness. There is no guarding.   Musculoskeletal:         General: No swelling or deformity. Normal range of motion.      Cervical back: Normal range of motion and neck supple. No rigidity.   Skin:     General: Skin is warm and dry.      Findings: No erythema or rash.   Neurological:      General: No focal deficit present.      Mental Status: She is alert and oriented to person, place, and time.   Psychiatric:         Mood and Affect: Mood normal.         Behavior: Behavior normal.               "

## 2024-12-08 PROBLEM — B00.1 COLD SORE: Status: ACTIVE | Noted: 2024-12-08

## 2024-12-09 NOTE — ASSESSMENT & PLAN NOTE
Known h/o cold sores  Appear when sick, stressed  Will prescribe both abreva for topical, and valtrex for management of outbreak  Orders:    valACYclovir (VALTREX) 1,000 mg tablet; Take 2 tablets (2,000 mg total) by mouth 2 (two) times a day Take 2 tablets, then 12 hours later, take another 2 tablets. That's the entire treatment course (1 day). Other 26 tablets may be saved in the home for future cold sore outbreaks.    docosanol (ABREVA) 10 %; Apply topically 5 (five) times a day

## 2024-12-10 ENCOUNTER — HOSPITAL ENCOUNTER (EMERGENCY)
Facility: HOSPITAL | Age: 34
Discharge: HOME | End: 2024-12-11
Payer: COMMERCIAL

## 2024-12-10 ENCOUNTER — APPOINTMENT (EMERGENCY)
Dept: RADIOLOGY | Facility: HOSPITAL | Age: 34
End: 2024-12-10
Payer: COMMERCIAL

## 2024-12-10 DIAGNOSIS — J06.9 VIRAL URI WITH COUGH: ICD-10-CM

## 2024-12-10 DIAGNOSIS — J45.909 ASTHMA: ICD-10-CM

## 2024-12-10 DIAGNOSIS — G43.909 MIGRAINE: Primary | ICD-10-CM

## 2024-12-10 DIAGNOSIS — J18.9 PNEUMONIA: ICD-10-CM

## 2024-12-10 LAB
ANION GAP SERPL CALCULATED.3IONS-SCNC: 11 MMOL/L (ref 4–13)
BASOPHILS # BLD AUTO: 0 THOUSANDS/ÂΜL (ref 0–0.1)
BASOPHILS NFR BLD AUTO: 0 % (ref 0–1)
BUN SERPL-MCNC: 9 MG/DL (ref 5–25)
CALCIUM SERPL-MCNC: 8.9 MG/DL (ref 8.4–10.2)
CHLORIDE SERPL-SCNC: 104 MMOL/L (ref 96–108)
CO2 SERPL-SCNC: 23 MMOL/L (ref 21–32)
CREAT SERPL-MCNC: 0.67 MG/DL (ref 0.6–1.3)
EOSINOPHIL # BLD AUTO: 0 THOUSAND/ÂΜL (ref 0–0.61)
EOSINOPHIL NFR BLD AUTO: 0 % (ref 0–6)
ERYTHROCYTE [DISTWIDTH] IN BLOOD BY AUTOMATED COUNT: 13.6 % (ref 11.6–15.1)
FLUAV AG UPPER RESP QL IA.RAPID: NEGATIVE
FLUBV AG UPPER RESP QL IA.RAPID: NEGATIVE
GFR SERPL CREATININE-BSD FRML MDRD: 116 ML/MIN/1.73SQ M
GLUCOSE SERPL-MCNC: 130 MG/DL (ref 65–140)
HCT VFR BLD AUTO: 36.9 % (ref 34.8–46.1)
HGB BLD-MCNC: 12.3 G/DL (ref 11.5–15.4)
IMM GRANULOCYTES # BLD AUTO: 0.01 THOUSAND/UL (ref 0–0.2)
IMM GRANULOCYTES NFR BLD AUTO: 0 % (ref 0–2)
LYMPHOCYTES # BLD AUTO: 1.95 THOUSANDS/ÂΜL (ref 0.6–4.47)
LYMPHOCYTES NFR BLD AUTO: 28 % (ref 14–44)
MCH RBC QN AUTO: 28.1 PG (ref 26.8–34.3)
MCHC RBC AUTO-ENTMCNC: 33.3 G/DL (ref 31.4–37.4)
MCV RBC AUTO: 84 FL (ref 82–98)
MONOCYTES # BLD AUTO: 0.83 THOUSAND/ÂΜL (ref 0.17–1.22)
MONOCYTES NFR BLD AUTO: 12 % (ref 4–12)
NEUTROPHILS # BLD AUTO: 4.21 THOUSANDS/ÂΜL (ref 1.85–7.62)
NEUTS SEG NFR BLD AUTO: 60 % (ref 43–75)
NRBC BLD AUTO-RTO: 0 /100 WBCS
PLATELET # BLD AUTO: 169 THOUSANDS/UL (ref 149–390)
PMV BLD AUTO: 10.7 FL (ref 8.9–12.7)
POTASSIUM SERPL-SCNC: 3.2 MMOL/L (ref 3.5–5.3)
RBC # BLD AUTO: 4.37 MILLION/UL (ref 3.81–5.12)
SARS-COV+SARS-COV-2 AG RESP QL IA.RAPID: NEGATIVE
SODIUM SERPL-SCNC: 138 MMOL/L (ref 135–147)
WBC # BLD AUTO: 7 THOUSAND/UL (ref 4.31–10.16)

## 2024-12-10 PROCEDURE — 71045 X-RAY EXAM CHEST 1 VIEW: CPT

## 2024-12-10 PROCEDURE — 96365 THER/PROPH/DIAG IV INF INIT: CPT

## 2024-12-10 PROCEDURE — 99284 EMERGENCY DEPT VISIT MOD MDM: CPT

## 2024-12-10 PROCEDURE — 36415 COLL VENOUS BLD VENIPUNCTURE: CPT

## 2024-12-10 PROCEDURE — 96375 TX/PRO/DX INJ NEW DRUG ADDON: CPT

## 2024-12-10 PROCEDURE — 85025 COMPLETE CBC W/AUTO DIFF WBC: CPT

## 2024-12-10 PROCEDURE — 87811 SARS-COV-2 COVID19 W/OPTIC: CPT

## 2024-12-10 PROCEDURE — 80048 BASIC METABOLIC PNL TOTAL CA: CPT

## 2024-12-10 PROCEDURE — 87804 INFLUENZA ASSAY W/OPTIC: CPT

## 2024-12-10 RX ORDER — KETOROLAC TROMETHAMINE 30 MG/ML
15 INJECTION, SOLUTION INTRAMUSCULAR; INTRAVENOUS ONCE
Status: COMPLETED | OUTPATIENT
Start: 2024-12-10 | End: 2024-12-10

## 2024-12-10 RX ORDER — METOCLOPRAMIDE HYDROCHLORIDE 5 MG/ML
10 INJECTION INTRAMUSCULAR; INTRAVENOUS ONCE
Status: COMPLETED | OUTPATIENT
Start: 2024-12-10 | End: 2024-12-10

## 2024-12-10 RX ORDER — MAGNESIUM SULFATE HEPTAHYDRATE 40 MG/ML
2 INJECTION, SOLUTION INTRAVENOUS ONCE
Status: COMPLETED | OUTPATIENT
Start: 2024-12-10 | End: 2024-12-11

## 2024-12-10 RX ORDER — DIPHENHYDRAMINE HYDROCHLORIDE 50 MG/ML
25 INJECTION INTRAMUSCULAR; INTRAVENOUS ONCE
Status: COMPLETED | OUTPATIENT
Start: 2024-12-10 | End: 2024-12-10

## 2024-12-10 RX ADMIN — METOCLOPRAMIDE 10 MG: 5 INJECTION, SOLUTION INTRAMUSCULAR; INTRAVENOUS at 23:02

## 2024-12-10 RX ADMIN — SODIUM CHLORIDE 1000 ML: 0.9 INJECTION, SOLUTION INTRAVENOUS at 23:00

## 2024-12-10 RX ADMIN — KETOROLAC TROMETHAMINE 15 MG: 30 INJECTION, SOLUTION INTRAMUSCULAR at 23:02

## 2024-12-10 RX ADMIN — MAGNESIUM SULFATE HEPTAHYDRATE 2 G: 40 INJECTION, SOLUTION INTRAVENOUS at 23:07

## 2024-12-10 RX ADMIN — DIPHENHYDRAMINE HYDROCHLORIDE 25 MG: 50 INJECTION, SOLUTION INTRAMUSCULAR; INTRAVENOUS at 23:02

## 2024-12-10 NOTE — Clinical Note
Rafaela Chiang was seen and treated in our emergency department on 12/10/2024.                Diagnosis:     Rafaela  .    She may return on this date: 12/16/2024         If you have any questions or concerns, please don't hesitate to call.      Luis Eduardo Cardenas MD    ______________________________           _______________          _______________  Hospital Representative                              Date                                Time

## 2024-12-11 ENCOUNTER — TELEMEDICINE (EMERGENCY)
Dept: FAMILY MEDICINE CLINIC | Facility: CLINIC | Age: 34
End: 2024-12-11
Payer: COMMERCIAL

## 2024-12-11 ENCOUNTER — RESULTS FOLLOW-UP (OUTPATIENT)
Dept: EMERGENCY DEPT | Facility: HOSPITAL | Age: 34
End: 2024-12-11

## 2024-12-11 VITALS
BODY MASS INDEX: 36.58 KG/M2 | HEART RATE: 92 BPM | TEMPERATURE: 97.7 F | SYSTOLIC BLOOD PRESSURE: 108 MMHG | WEIGHT: 200 LBS | OXYGEN SATURATION: 95 % | DIASTOLIC BLOOD PRESSURE: 59 MMHG | RESPIRATION RATE: 18 BRPM

## 2024-12-11 DIAGNOSIS — J18.9 PNEUMONIA OF RIGHT UPPER LOBE DUE TO INFECTIOUS ORGANISM: Primary | ICD-10-CM

## 2024-12-11 PROCEDURE — 96366 THER/PROPH/DIAG IV INF ADDON: CPT

## 2024-12-11 PROCEDURE — 99213 OFFICE O/P EST LOW 20 MIN: CPT

## 2024-12-11 RX ORDER — AMOXICILLIN 500 MG/1
1000 CAPSULE ORAL 3 TIMES DAILY
Qty: 30 CAPSULE | Refills: 0 | Status: SHIPPED | OUTPATIENT
Start: 2024-12-11 | End: 2024-12-16

## 2024-12-11 NOTE — PROGRESS NOTES
Virtual Regular Visit  Name: Rafaela Chiang      : 1990      MRN: [id number]  Encounter Provider: Saray King MD  Encounter Date: 2024   Encounter department: [facility]      Verification of patient location:  Patient is located at Home in the following state in which I hold an active license [place] :  Assessment & Plan        Encounter provider Saray King MD    The patient was identified by name and date of birth. Rafaela Chiang was informed that this is a telemedicine visit and that the visit is being conducted through the Epic Embedded platform. She agrees to proceed..  My office door was closed. No one else was in the room.  She acknowledged consent and understanding of privacy and security of the video platform. The patient has agreed to participate and understands they can discontinue the visit at any time.    Patient is aware this is a billable service.     History of Present Illness {?Quick Links Encounters * My Last Note * Last Note in Specialty * Snapshot * Since Last Visit * History :95209}          Seen in ED 12/10  CXR showing RUL PNA  Prescribed Amox 5 days  Take alongside our script for Doxy 10 days  Symptom management w supportive care  Continue to monitor           Review of Systems    Objective {?Quick Links Trend Vitals * Enter New Vitals * Results Review * Timeline (Adult) * Labs * Imaging * Cardiology * Procedures * Lung Cancer Screening * Surgical eConsent :94822}  LMP 2024 (Exact Date)     Physical Exam    Visit Time  Total Visit Duration: ***   chest pain and palpitations.   Gastrointestinal:  Positive for nausea. Negative for constipation, diarrhea and vomiting.   Genitourinary:  Negative for dysuria, flank pain, frequency and urgency.   Musculoskeletal:  Negative for arthralgias and back pain.   Skin:  Negative for color change and rash.   Neurological:  Positive for headaches. Negative for seizures and syncope.   Psychiatric/Behavioral:  Positive for dysphoric mood and sleep disturbance. Negative for agitation and confusion. The patient is nervous/anxious.        Objective   LMP 12/08/2024 (Exact Date)     Physical Exam  Constitutional:       General: She is not in acute distress.     Appearance: She is ill-appearing.   HENT:      Head: Atraumatic.      Nose: Congestion present.      Mouth/Throat:      Pharynx: Oropharynx is clear.   Eyes:      Extraocular Movements: Extraocular movements intact.   Pulmonary:      Comments: Cough, at times out of breath after ambulating around house  Musculoskeletal:         General: Normal range of motion.      Cervical back: Normal range of motion.   Neurological:      Mental Status: She is alert and oriented to person, place, and time.   Psychiatric:         Mood and Affect: Mood normal.         Behavior: Behavior normal.         Visit Time  Total Visit Duration: 45min

## 2024-12-11 NOTE — DISCHARGE INSTRUCTIONS
Please call Dr. Lopez to schedule follow-up appointment.  If you are feeling significantly more short of breath or having chest pain that is worse or different come back to the ED to get checked out again.  Continue take all medications as prescribed.

## 2024-12-11 NOTE — ASSESSMENT & PLAN NOTE
Seen in ED 12/10  CXR showing RUL PNA  Prescribed Amox 5 days  Take alongside our script for Doxy 10 days  Symptom management w supportive care  Continue to monitor

## 2024-12-12 ENCOUNTER — TELEMEDICINE (OUTPATIENT)
Dept: FAMILY MEDICINE CLINIC | Facility: CLINIC | Age: 34
End: 2024-12-12
Payer: COMMERCIAL

## 2024-12-12 DIAGNOSIS — F41.9 ANXIETY: ICD-10-CM

## 2024-12-12 DIAGNOSIS — J18.9 PNEUMONIA OF RIGHT UPPER LOBE DUE TO INFECTIOUS ORGANISM: Primary | ICD-10-CM

## 2024-12-12 PROCEDURE — 99213 OFFICE O/P EST LOW 20 MIN: CPT

## 2024-12-12 RX ORDER — HYDROXYZINE HYDROCHLORIDE 25 MG/1
25 TABLET, FILM COATED ORAL EVERY 6 HOURS PRN
Qty: 30 TABLET | Refills: 2 | Status: SHIPPED | OUTPATIENT
Start: 2024-12-12

## 2024-12-12 NOTE — PROGRESS NOTES
Virtual Regular Visit  Name: Rafaela Chiang      : 1990      MRN: [id number]  Encounter Provider: Saray King MD  Encounter Date: 2024   Encounter department: [facility]      Verification of patient location:  Patient is located at Home in the following state in which I hold an active license [place] :  Assessment & Plan  Anxiety  Will try Atarax PRN Q6H for feelings of anxiety/overwhelmed  Continue to monitor    Orders:    hydrOXYzine HCL (ATARAX) 25 mg tablet; Take 1 tablet (25 mg total) by mouth every 6 (six) hours as needed for anxiety (Feelings of anxiety and being overwhelmed.)    Pneumonia of right upper lobe due to infectious organism  Advised to continue Doxycycline as prescribed  Some improvement, day2 of med, likely 2/2 good night sleep  Advised on supportive care             Encounter provider Saray King MD    The patient was identified by name and date of birth. Rafaela Chiang was informed that this is a telemedicine visit and that the visit is being conducted through the Epic Embedded platform. She agrees to proceed..  My office door was closed. No one else was in the room.  She acknowledged consent and understanding of privacy and security of the video platform. The patient has agreed to participate and understands they can discontinue the visit at any time.    Patient is aware this is a billable service.     History of Present Illness     Ms Peres is being seen via telemed regarding ongoing URI symptoms as well as symptoms of anxiety and feeling overwhelmed. Patient states that URI symptoms have improved minimally since starting antibiotics, this would be day2.   Regarding symptoms of anxiety, feeling emotionally overwhelmed, since having some of the kids home sick while she is also unwell has been a source of increased stress. Yesterday had an episode of heightened anxiety, but unable to tell which med was Atarax for acute  anxiety, and so was unable to take this med to see if it helped. Reviewed her current stock of meds, and it was not present, will prescribe again. F/u closely       Review of Systems   Constitutional:  Positive for fatigue. Negative for activity change, chills and fever.   HENT:  Positive for congestion, rhinorrhea and sinus pressure. Negative for ear pain and sore throat.    Eyes:  Negative for pain and visual disturbance.   Respiratory:  Positive for cough. Negative for shortness of breath and wheezing.    Cardiovascular:  Negative for chest pain and palpitations.   Gastrointestinal:  Positive for nausea. Negative for abdominal pain, constipation, diarrhea and vomiting.   Genitourinary:  Negative for dysuria and hematuria.   Musculoskeletal:  Negative for arthralgias and back pain.   Skin:  Negative for color change and rash.   Neurological:  Positive for headaches. Negative for seizures and syncope.   Psychiatric/Behavioral:  Positive for dysphoric mood and sleep disturbance. Negative for agitation and confusion. The patient is nervous/anxious.    All other systems reviewed and are negative.      Objective   LMP 12/08/2024 (Exact Date)     Physical Exam  Constitutional:       General: She is not in acute distress.     Appearance: Normal appearance. She is obese. She is ill-appearing.   HENT:      Head: Atraumatic.      Right Ear: External ear normal.      Left Ear: External ear normal.      Nose: Nose normal.      Mouth/Throat:      Pharynx: Oropharynx is clear.   Eyes:      Extraocular Movements: Extraocular movements intact.   Pulmonary:      Effort: Pulmonary effort is normal.   Musculoskeletal:         General: Normal range of motion.      Cervical back: Normal range of motion.   Neurological:      Mental Status: She is alert and oriented to person, place, and time.   Psychiatric:         Mood and Affect: Mood normal.         Behavior: Behavior normal.         Visit Time  Total Visit Duration: 30min

## 2024-12-13 NOTE — ED PROVIDER NOTES
Time reflects when diagnosis was documented in both MDM as applicable and the Disposition within this note       Time User Action Codes Description Comment    12/11/2024 12:38 AM Luis Eduardo Cardenas Add [G43.909] Migraine     12/11/2024 12:38 AM Luis Eduardo Cardenas Add [J06.9] Viral URI with cough     12/11/2024 12:39 AM Luis Eduardo Cardenas Add [J45.909] Asthma     12/11/2024  8:12 AM Margie Carvalho Add [J18.9] Pneumonia           ED Disposition       ED Disposition   Discharge    Condition   Stable    Date/Time   Wed Dec 11, 2024 12:38 AM    Comment   Rafaela Mariia discharge to home/self care.                   Assessment & Plan       Medical Decision Making  Medical complexity:  This 34 year old female presenting with cough, congestion, fevers, body aches and headaches suspicious for Upper Respiratory infection.  Given that she is now having LAINEZ then I would also consider the possibility of anemia, acute renal insufficiency, acute metabolic disturbances or severe occult bacterial infection as a possible cause of her symptoms as well.  Will evaluate with blood counts and viral panel.  Given her ongoing/worsening cough I wonder if her URI has gone lower into the parenchyma of the lung and therefore will get a chest xray to look for pneumonia (patient is already on a preferred antibiotic (doxycycline)).  Thus far, the patient has been doing everything that I would have recommended at home using supportive care and coming in when she felt a little more short of breath.  Thankfully here in the emergency department, the patient has maintained oxygen saturations well above goal even with exertional movements.  She is not tachycardic, and not acutely distressed however she does appear like she does not feel well.  She is tolerating p.o. here in the ED.  Will continue to monitor closely.    Reassessment/disposition: Patient does have sign of infiltrate in her right lung.  I did not see this when I initially interpreted the  xray myself in the ED.  She was contacted the next morning to let her know of the finding.  She is currently on doxycycline, I would advise that she continues her antibiotic in addition to supportive care measures.  Given the pneumonia, she was also called in a new dose of amoxicillin to take in addition to the doxycycline as it was unclear whether the doxycycline had been effective against the pneumonia.  Patient was discharged from the ED because she required no supplemental O2, and maintained normal VS without sign of distress while tolerating PO here in the ED.  She was instructed to come back to the ED if her SOB were to worsen, if she were to have sudden severe chest pain, or if she were to be unable to tolerate PO in spite of medication at home.  Patient expressed understanding of there return precautions and was discharged (she was not aware at time of discharge that she had pneumonia on her Xray).      Amount and/or Complexity of Data Reviewed  Labs: ordered.  Radiology: ordered and independent interpretation performed.    Risk  Prescription drug management.             Medications   sodium chloride 0.9 % bolus 1,000 mL (0 mL Intravenous Stopped 12/11/24 0101)   magnesium sulfate 2 g/50 mL IVPB (premix) 2 g (0 g Intravenous Stopped 12/11/24 0101)   ketorolac (TORADOL) injection 15 mg (15 mg Intravenous Given 12/10/24 2302)   metoclopramide (REGLAN) injection 10 mg (10 mg Intravenous Given 12/10/24 2302)   diphenhydrAMINE (BENADRYL) injection 25 mg (25 mg Intravenous Given 12/10/24 2302)       ED Risk Strat Scores                          SBIRT 20yo+      Flowsheet Row Most Recent Value   Initial Alcohol Screen: US AUDIT-C     1. How often do you have a drink containing alcohol? 0 Filed at: 12/10/2024 2217   2. How many drinks containing alcohol do you have on a typical day you are drinking?  0 Filed at: 12/10/2024 2217   3b. FEMALE Any Age, or MALE 65+: How often do you have 4 or more drinks on one  occassion? 0 Filed at: 12/10/2024 2217   Audit-C Score 0 Filed at: 12/10/2024 2217   NILES: How many times in the past year have you...    Used an illegal drug or used a prescription medication for non-medical reasons? Never Filed at: 12/10/2024 2217                            History of Present Illness       Chief Complaint   Patient presents with    Migraine     Pt presents with with ongoing migraine, cough, body aches x 4 days, last dose of Tylenol and Musinex at 1730       Past Medical History:   Diagnosis Date    Asthma     Bipolar 1 disorder (HCC)     BV (bacterial vaginosis) 10/12/2023    Candidiasis 10/12/2023    Closed right radial fracture     Dichorionic diamniotic twin pregnancy 06/01/2023    Last Assessment & Plan: Formatting of this note might be different from the original. US reviewed, normal findings. FETUS A BPD              7.8 cm        31 weeks 2 days* (41%) HC              29.0 cm        31 weeks 4 days* (29%) AC              28.5 cm        32 weeks 4 days* (81%) Femur            6.0 cm        31 weeks 2 days* (38%) HC/AC           1.02 FL/AC             21 FL/BPD                High-risk pregnancy     PTSD (post-traumatic stress disorder)       Past Surgical History:   Procedure Laterality Date    COLONOSCOPY      MOUTH SURGERY      WISDOM TOOTH EXTRACTION        Family History   Problem Relation Age of Onset    Diabetes Mother     Heart attack Mother     Heart disease Mother     Hypertension Mother     Coronary artery disease Mother     Heart failure Mother     Hypothyroidism Mother     Mental illness Father     Diabetes Maternal Uncle     Heart disease Maternal Uncle     Coronary artery disease Maternal Uncle     Diabetes Maternal Grandmother     Hypertension Maternal Grandmother     Cancer Paternal Grandmother       Social History     Tobacco Use    Smoking status: Some Days     Current packs/day: 0.25     Average packs/day: 0.3 packs/day for 16.9 years (4.2 ttl pk-yrs)     Types: Cigarettes      Start date: 2008     Passive exposure: Current    Smokeless tobacco: Never   Vaping Use    Vaping status: Never Used   Substance Use Topics    Alcohol use: Yes     Comment: social    Drug use: Yes     Types: Marijuana     Comment: Medical card      E-Cigarette/Vaping    E-Cigarette Use Never User       E-Cigarette/Vaping Substances    Nicotine No     THC No     CBD No     Flavoring No     Other No     Unknown No       I have reviewed and agree with the history as documented.     This is a 34-year-old female with known history of asthma, hidradenitis suppurativa, and migraines is presenting today due to 4 days of worsening symptoms including cough, body aches, fevers, and headaches.  The patient has been seen by her family doctor where she was prescribed a course of doxycyline for the upper respiratory symptoms as well as for a breakout under her left axilla concerning for hidradenitis suppurativa that may be superinfected.  Patient just started the antibiotic.  She states that over the past 24 hours, her cough has worsened, she now is experiencing mild shortness of breath especially with exertional activities.  She notes that she has been taking fever medications, and over-the-counter cough medications without relief of her symptoms.  She denies having to use her inhaler more often than she typically would.  She denies any leg swelling or chest pain.          Review of Systems   Constitutional:  Positive for appetite change, chills, fatigue and fever.   HENT:  Positive for congestion, sneezing and sore throat. Negative for ear pain.    Eyes:  Negative for pain and visual disturbance.   Respiratory:  Positive for cough and shortness of breath.    Cardiovascular:  Positive for chest pain. Negative for palpitations.   Gastrointestinal:  Positive for nausea and vomiting. Negative for abdominal pain.   Genitourinary:  Negative for dysuria and hematuria.   Musculoskeletal:  Negative for arthralgias and back pain.    Skin:  Negative for color change and rash.   Neurological:  Positive for weakness. Negative for seizures and syncope.   All other systems reviewed and are negative.          Objective       ED Triage Vitals [12/10/24 2215]   Temperature Pulse Blood Pressure Respirations SpO2 Patient Position - Orthostatic VS   97.7 °F (36.5 °C) 99 131/79 18 97 % Sitting      Temp Source Heart Rate Source BP Location FiO2 (%) Pain Score    Temporal Monitor Left arm -- 5      Vitals      Date and Time Temp Pulse SpO2 Resp BP Pain Score FACES Pain Rating User   12/11/24 0030 -- 92 95 % 18 108/59 -- -- BB   12/10/24 2300 -- 97 96 % -- 117/67 -- -- CK   12/10/24 2300 -- -- -- -- -- 4 -- BB   12/10/24 2215 97.7 °F (36.5 °C) 99 97 % 18 131/79 5 -- BB            Physical Exam  Vitals and nursing note reviewed.   Constitutional:       General: She is not in acute distress.     Appearance: She is well-developed. She is obese.   HENT:      Head: Normocephalic and atraumatic.      Right Ear: External ear normal.      Left Ear: External ear normal.      Nose: Nose normal. No congestion or rhinorrhea.      Mouth/Throat:      Mouth: Mucous membranes are moist.      Pharynx: Oropharynx is clear. No oropharyngeal exudate or posterior oropharyngeal erythema.   Eyes:      General: No scleral icterus.     Extraocular Movements: Extraocular movements intact.      Conjunctiva/sclera: Conjunctivae normal.      Pupils: Pupils are equal, round, and reactive to light.   Cardiovascular:      Rate and Rhythm: Regular rhythm. Tachycardia present.      Pulses: Normal pulses.      Heart sounds: Normal heart sounds. No murmur heard.  Pulmonary:      Effort: Pulmonary effort is normal. No respiratory distress.      Breath sounds: Rhonchi (scatterred) present. No wheezing.   Abdominal:      General: Abdomen is flat. There is no distension.      Palpations: Abdomen is soft.      Tenderness: There is no abdominal tenderness. There is no guarding.   Musculoskeletal:          General: No swelling.      Cervical back: Neck supple. No rigidity.      Right lower leg: No edema.      Left lower leg: No edema.   Lymphadenopathy:      Cervical: No cervical adenopathy.   Skin:     General: Skin is warm and dry.      Capillary Refill: Capillary refill takes less than 2 seconds.      Coloration: Skin is not jaundiced.      Findings: No rash.   Neurological:      General: No focal deficit present.      Mental Status: She is alert and oriented to person, place, and time. Mental status is at baseline.   Psychiatric:         Mood and Affect: Mood normal.         Behavior: Behavior normal.         Results Reviewed       Procedure Component Value Units Date/Time    FLU/COVID Rapid Antigen (30 min. TAT) - Preferred screening test in ED [[id number]]  (Normal) Collected: 12/10/24 8813    Lab Status: Final result Specimen: Nares from Nose Updated: 12/10/24 2320     SARS COV Rapid Antigen Negative     Influenza A Rapid Antigen Negative     Influenza B Rapid Antigen Negative    Narrative:      This test has been performed using the [facility] 2 FLU+SARS Antigen test under the Emergency Use Authorization (EUA). This test has been validated by the  and verified by the performing laboratory. The Chantel uses lateral flow immunofluorescent sandwich assay to detect SARS-COV, Influenza A and Influenza B Antigen.     The [facility] Chantel 2 SARS Antigen test does not differentiate between SARS-CoV and SARS-CoV-2.     Negative results are presumptive and may be confirmed with a molecular assay, if necessary, for patient management. Negative results do not rule out SARS-CoV-2 or influenza infection and should not be used as the sole basis for treatment or patient management decisions. A negative test result may occur if the level of antigen in a sample is below the limit of detection of this test.     Positive results are indicative of the presence of viral antigens, but do not rule out bacterial  infection or co-infection with other viruses.     All test results should be used as an adjunct to clinical observations and other information available to the provider.    FOR PEDIATRIC PATIENTS - copy/paste COVID Guidelines URL to browser: [email or web address]    Basic metabolic panel [[id number]]  (Abnormal) Collected: 12/10/24 2258    Lab Status: Final result Specimen: Blood from Arm, Right Updated: 12/10/24 2318     Sodium 138 mmol/L      Potassium 3.2 mmol/L      Chloride 104 mmol/L      CO2 23 mmol/L      ANION GAP 11 mmol/L      BUN 9 mg/dL      Creatinine 0.67 mg/dL      Glucose 130 mg/dL      Calcium 8.9 mg/dL      eGFR 116 ml/min/1.73sq m     Narrative:      National Kid[employer] Disease Foundation guidelines for Chronic Kidney Disease (CKD):     Stage 1 with normal or high GFR (GFR > 90 mL/min/1.73 square meters)    Stage 2 Mild CKD (GFR = 60-89 mL/min/1.73 square meters)    Stage 3A Moderate CKD (GFR = 45-59 mL/min/1.73 square meters)    Stage 3B Moderate CKD (GFR = 30-44 mL/min/1.73 square meters)    Stage 4 Severe CKD (GFR = 15-29 mL/min/1.73 square meters)    Stage 5 End Stage CKD (GFR <15 mL/min/1.73 square meters)  Note: GFR calculation is accurate only with a steady state creatinine    CBC and differential [[id number]] Collected: 12/10/24 2258    Lab Status: Final result Specimen: Blood from Arm, Right Updated: 12/10/24 2304     WBC 7.00 Thousand/uL      RBC 4.37 Million/uL      Hemoglobin 12.3 g/dL      Hematocrit 36.9 %      MCV 84 fL      MCH 28.1 pg      MCHC 33.3 g/dL      RDW 13.6 %      MPV 10.7 fL      Platelets 169 Thousands/uL      nRBC 0 /100 WBCs      Segmented % 60 %      Immature Grans % 0 %      Lymphocytes % 28 %      Monocytes % 12 %      Eosinophils Relative 0 %      Basophils Relative 0 %      Absolute Neutrophils 4.21 Thousands/µL      Absolute Immature Grans 0.01 Thousand/uL      Absolute Lymphocytes 1.95 Thousands/µL      Absolute  Monocytes 0.83 Thousand/µL      Eosinophils Absolute 0.00 Thousand/µL      Basophils Absolute 0.00 Thousands/µL             XR chest portable   ED Interpretation by Luis Eduardo Cardenas MD (12/11 0007)   No acute cardiopulmonary pathology identified      Final Interpretation by Cuba Donis MD (12/11 0742)      Right upper lobe pneumonia.      The study was marked in EPIC for immediate notification.            Workstation performed: [id number]             Procedures    ED Medication and Procedure Management   Prior to Admission Medications   Prescriptions Last Dose Informant Patient Reported? Taking?   ARIPiprazole (ABILIFY) 10 mg tablet   No No   Sig: Take 1 tablet (10 mg total) by mouth daily   EPINEPHrine (EPIPEN) 0.3 mg/0.3 mL SOAJ   No No   Sig: Inject 0.3 mL (0.3 mg total) into a muscle once for 1 dose   albuterol (PROVENTIL HFA,VENTOLIN HFA) 90 mcg/act inhaler   No No   Sig: Inhale 2 puffs every 6 (six) hours as needed for wheezing or shortness of breath   benzonatate (TESSALON PERLES) 100 mg capsule   No No   Sig: Take 1 capsule (100 mg total) by mouth 3 (three) times a day as needed for cough   cetirizine (ZyrTEC) 10 mg tablet   No No   Sig: Take 1 tablet (10 mg total) by mouth daily   docosanol (ABREVA) 10 %   No No   Sig: Apply topically 5 (five) times a day   doxycycline hyclate (VIBRAMYCIN) 100 mg capsule   No No   Sig: Take 1 capsule (100 mg total) by mouth every 12 (twelve) hours for 10 days   ondansetron (ZOFRAN) 4 mg tablet   No No   Sig: Take 1 tablet (4 mg total) by mouth every 6 (six) hours as needed for nausea or vomiting   valACYclovir (VALTREX) 1,000 mg tablet   No No   Sig: Take 2 tablets (2,000 mg total) by mouth 2 (two) times a day Take 2 tablets, then 12 hours later, take another 2 tablets. That's the entire treatment course (1 day). Other 26 tablets may be saved in the home for future cold sore outbreaks.      Facility-Administered Medications: None     Discharge Medication List as of  12/11/2024 12:40 AM        CONTINUE these medications which have NOT CHANGED    Details   albuterol (PROVENTIL HFA,VENTOLIN HFA) 90 mcg/act inhaler Inhale 2 puffs every 6 (six) hours as needed for wheezing or shortness of breath, Starting Tue 10/15/2024, Normal      ARIPiprazole (ABILIFY) 10 mg tablet Take 1 tablet (10 mg total) by mouth daily, Starting Wed 12/4/2024, Normal      benzonatate (TESSALON PERLES) 100 mg capsule Take 1 capsule (100 mg total) by mouth 3 (three) times a day as needed for cough, Starting Tue 12/10/2024, Normal      cetirizine (ZyrTEC) 10 mg tablet Take 1 tablet (10 mg total) by mouth daily, Starting Tue 12/10/2024, Until Mon 3/10/2025, Normal      docosanol (ABREVA) 10 % Apply topically 5 (five) times a day, Starting Tue 11/26/2024, Normal      doxycycline hyclate (VIBRAMYCIN) 100 mg capsule Take 1 capsule (100 mg total) by mouth every 12 (twelve) hours for 10 days, Starting Tue 12/10/2024, Until Fri 12/20/2024, Normal      EPINEPHrine (EPIPEN) 0.3 mg/0.3 mL SOAJ Inject 0.3 mL (0.3 mg total) into a muscle once for 1 dose, Starting Mon 2/26/2024, Normal      ondansetron (ZOFRAN) 4 mg tablet Take 1 tablet (4 mg total) by mouth every 6 (six) hours as needed for nausea or vomiting, Starting Tue 12/10/2024, Normal      valACYclovir (VALTREX) 1,000 mg tablet Take 2 tablets (2,000 mg total) by mouth 2 (two) times a day Take 2 tablets, then 12 hours later, take another 2 tablets. That's the entire treatment course (1 day). Other 26 tablets may be saved in the home for future cold sore outbreaks., Starting Tue 11/26/2024, Until Thu 12/26/2024, Normal      hydrOXYzine HCL (ATARAX) 25 mg tablet Take 1 tablet (25 mg total) by mouth every 6 (six) hours as needed for itching, Starting Tue 11/19/2024, Normal           No discharge procedures on file.  ED SEPSIS DOCUMENTATION   Time reflects when diagnosis was documented in both MDM as applicable and the Disposition within this note       Time User Action  Codes Description Comment    12/11/2024 12:38 AM Luis Eduardo Cardenas [G43.909] Migraine     12/11/2024 12:38 AM Luis Eduardo Cardenas [J06.9] Viral URI with cough     12/11/2024 12:39 AM Luis Eduardo Cardenas [J45.909] Asthma     12/11/2024  8:12 AM Margie Carvalho [J18.9] Pneumonia                  Luis Eduardo Cardenas MD  12/13/24 0554

## 2024-12-19 PROBLEM — L02.91 ABSCESS: Status: RESOLVED | Noted: 2019-12-18 | Resolved: 2024-12-19

## 2024-12-19 NOTE — ASSESSMENT & PLAN NOTE
Will try Atarax PRN Q6H for feelings of anxiety/overwhelmed  Continue to monitor    Orders:    hydrOXYzine HCL (ATARAX) 25 mg tablet; Take 1 tablet (25 mg total) by mouth every 6 (six) hours as needed for anxiety (Feelings of anxiety and being overwhelmed.)

## 2024-12-19 NOTE — ASSESSMENT & PLAN NOTE
Advised to continue Doxycycline as prescribed  Some improvement, day2 of med, likely 2/2 good night sleep  Advised on supportive care

## 2025-01-08 DIAGNOSIS — R73.01 ELEVATED FASTING GLUCOSE: Primary | ICD-10-CM

## 2025-01-08 DIAGNOSIS — T78.40XA ALLERGY, INITIAL ENCOUNTER: ICD-10-CM

## 2025-01-08 DIAGNOSIS — F41.9 ANXIETY: ICD-10-CM

## 2025-01-08 DIAGNOSIS — F31.61 BIPOLAR DISORDER, CURRENT EPISODE MIXED, MILD (HCC): ICD-10-CM

## 2025-01-08 RX ORDER — ARIPIPRAZOLE 10 MG/1
10 TABLET ORAL DAILY
Qty: 30 TABLET | Refills: 2 | Status: SHIPPED | OUTPATIENT
Start: 2025-01-08

## 2025-01-08 RX ORDER — HYDROXYZINE HYDROCHLORIDE 25 MG/1
25 TABLET, FILM COATED ORAL EVERY 6 HOURS PRN
Qty: 30 TABLET | Refills: 2 | Status: SHIPPED | OUTPATIENT
Start: 2025-01-08

## 2025-01-08 RX ORDER — CETIRIZINE HYDROCHLORIDE 10 MG/1
10 TABLET ORAL DAILY
Qty: 30 TABLET | Refills: 2 | Status: SHIPPED | OUTPATIENT
Start: 2025-01-08 | End: 2025-04-08

## 2025-01-10 PROBLEM — J18.9 PNEUMONIA OF RIGHT UPPER LOBE DUE TO INFECTIOUS ORGANISM: Status: RESOLVED | Noted: 2024-12-11 | Resolved: 2025-01-10

## 2025-01-17 ENCOUNTER — TELEPHONE (OUTPATIENT)
Dept: FAMILY MEDICINE CLINIC | Facility: CLINIC | Age: 35
End: 2025-01-17

## 2025-01-29 ENCOUNTER — OFFICE VISIT (OUTPATIENT)
Dept: FAMILY MEDICINE CLINIC | Facility: CLINIC | Age: 35
End: 2025-01-29
Payer: COMMERCIAL

## 2025-01-29 VITALS
WEIGHT: 200 LBS | BODY MASS INDEX: 36.8 KG/M2 | HEIGHT: 62 IN | HEART RATE: 96 BPM | SYSTOLIC BLOOD PRESSURE: 114 MMHG | OXYGEN SATURATION: 98 % | RESPIRATION RATE: 18 BRPM | DIASTOLIC BLOOD PRESSURE: 76 MMHG

## 2025-01-29 DIAGNOSIS — R73.01 ELEVATED FASTING GLUCOSE: ICD-10-CM

## 2025-01-29 DIAGNOSIS — F31.61 BIPOLAR DISORDER, CURRENT EPISODE MIXED, MILD (HCC): ICD-10-CM

## 2025-01-29 DIAGNOSIS — Z00.00 MEDICARE ANNUAL WELLNESS VISIT, SUBSEQUENT: Primary | ICD-10-CM

## 2025-01-29 PROCEDURE — G0439 PPPS, SUBSEQ VISIT: HCPCS

## 2025-01-29 RX ORDER — ADALIMUMAB 40MG/0.4ML
40 KIT SUBCUTANEOUS
Status: CANCELLED | OUTPATIENT
Start: 2025-01-29

## 2025-01-29 NOTE — PROGRESS NOTES
Name: Rafaela Peres      : 1990      MRN: [id number]  Encounter Provider: Saray King MD  Encounter Date: 2025   Encounter department: [facility]    Assessment & Plan  Medicare annual wellness visit, subsequent         Bipolar disorder, current episode mixed, mild (HCC)  SSRIs  Abilify:   Duloxetine:    Does not wish to do meds      Orders:    Ambulatory referral to Psych Services; Future    Elevated fasting glucose         BMI 36.0-36.9,adult           Tobacco Cessation Counseling: Tobacco cessation counseling was provided. The patient is sincerely urged to quit consumption of tobacco. She is not ready to quit tobacco. Medication options and side effects of medication not discussed. Patient refused medication.       Preventive health issues were discussed with patient, and age appropriate screening tests were ordered as noted in patient's After Visit Summary. Personalized health advice and appropriate referrals for health education or preventive services given if needed, as noted in patient's After Visit Summary.    History of Present Illness     Ms Peres is being seen for her AWV. Current concerns today include weight management. Patient would like to try an injectable med, has previously been counseled on her options, how to use, and side effects. We are awaiting approval from insurance. Patient is also trying to incorporate more physical activity into her schedule w gym visits, currently uses calorie restriction for diet plan, discussed food choices. Will continue to monitor        Patient Care Team:  Saray King MD as PCP - General (Family Medicine)  Ruddy Lopez MD as PCP - PCP-[facility] ([facility])    Review of Systems   Constitutional:  Positive for activity change, appetite change and fatigue. Negative for chills and fever.   HENT:  Negative for congestion, ear pain, postnasal drip, rhinorrhea and sore throat.    Eyes:  Negative  for pain and visual disturbance.   Respiratory:  Negative for cough and shortness of breath.    Cardiovascular:  Negative for chest pain and palpitations.   Gastrointestinal:  Negative for abdominal pain, constipation, diarrhea, nausea and vomiting.   Genitourinary:  Negative for dysuria and hematuria.   Musculoskeletal:  Negative for arthralgias and back pain.   Skin:  Negative for color change and rash.   Neurological:  Negative for dizziness, seizures, syncope and headaches.   Psychiatric/Behavioral:  Positive for dysphoric mood and sleep disturbance. Negative for agitation, confusion, self-injury and suicidal ideas. The patient is nervous/anxious.    All other systems reviewed and are negative.    Medical History Reviewed by provider this encounter:       Annual Wellness Visit Questionnaire   Rafaela is here for her Subsequent Wellness visit.     Health Risk Assessment:   Patient rates overall health as fair. Patient feels that their physical health rating is same. Patient is dissatisfied with their life. Eyesight was rated as slightly worse. Hearing was rated as same. Patient feels that their emotional and mental health rating is same. Patients states they are often angry. Patient states they are often unusually tired/fatigued. Pain experienced in the last 7 days has been some. Patient's pain rating has been 3/10. Patient states that she has experienced weight loss or gain in last 6 months.     Fall Risk Screening:   In the past year, patient has experienced: no history of falling in past year      Urinary Incontinence Screening:   Patient has leaked urine accidently in the last six months.     Home Safety:  Patient does not have trouble with stairs inside or outside of their home. Patient has working smoke alarms and has working carbon monoxide detector. Home safety hazards include: none.     Nutrition:   Current diet is Regular.     Medications:   Patient is currently taking over-the-counter supplements. OTC  medications include: see medication list. Patient is not able to manage medications.     Activities of Daily Living (ADLs)/Instrumental Activities of Daily Living (IADLs):   Walk and transfer into and out of bed and chair?: Yes  Dress and groom yourself?: Yes    Bathe or shower yourself?: Yes    Feed yourself? Yes  Do your laundry/housekeeping?: Yes  Manage your money, pay your bills and track your expenses?: Yes  Make your own meals?: Yes    Do your own shopping?: Yes    Previous Hospitalizations:   Any hospitalizations or ED visits within the last 12 months?: No      Advance Care Planning:   Living will: No    Durable POA for healthcare: Yes    Advanced directive: No      PREVENTIVE SCREENINGS        Diabetes Screening:     General: Screening Current      Breast Cancer Screening:     General: Screening Not Indicated      Cervical Cancer Screening:    General: Screening Current      Lung Cancer Screening:     General: Screening Not Indicated      Hepatitis C Screening:    General: Screening Current    Screening, Brief Intervention, and Referral to Treatment (SBIRT)     Screening  Typical number of drinks in a day: 0  Typical number of drinks in a week: 0  Interpretation: Low risk drinking behavior.    AUDIT-C Screenin) How often did you have a drink containing alcohol in the past year? monthly or less  2) How many drinks did you have on a typical day when you were drinking in the past year? 1 to 2  3) How often did you have 6 or more drinks on one occasion in the past year? never    AUDIT-C Score: 1  Interpretation: Score 0-2 (female): Negative screen for alcohol misuse    Social Drivers of Health     Financial Resource Strain: Low Risk  (2023)    [facility]    Overall Financial Resource Strain (CARDIA)     Difficulty of Paying Living Expenses: Not hard at all   Food Insecurity: No Food Insecurity (2025)    Hunger Vital Sign     Worried  "About Running Out of Food in the Last Year: Never true     Ran Out of Food in the Last Year: Never true   Transportation Needs: No Transportation Needs (1/29/2025)    PRAPARE - Transportation     Lack of Transportation (Medical): No     Lack of Transportation (Non-Medical): No   Housing Stability: Low Risk  (1/29/2025)    Housing Stability Vital Sign     Unable to Pay for Housing in the Last Year: No     Number of Times Moved in the Last Year: 0     Homeless in the Last Year: No   Utilities: Not At Risk (1/29/2025)    [facility] Utilities     Threatened with loss of utilities: No     No results found.    Objective   /76 (BP Location: Right arm, Patient Position: Sitting, Cuff Size: Large)   Pulse 96   Resp 18   Ht 5' 2\" (1.575 m)   Wt 90.7 kg (200 lb)   SpO2 98%   BMI 36.58 kg/m²     Physical Exam  Vitals and nursing note reviewed.   Constitutional:       General: She is not in acute distress.     Appearance: Normal appearance. She is obese. She is not ill-appearing.   HENT:      Head: Normocephalic and atraumatic.      Right Ear: External ear normal.      Left Ear: External ear normal.      Nose: Nose normal.      Mouth/Throat:      Mouth: Mucous membranes are moist.      Pharynx: Oropharynx is clear.   Eyes:      Extraocular Movements: Extraocular movements intact.      Conjunctiva/sclera: Conjunctivae normal.   Cardiovascular:      Rate and Rhythm: Normal rate and regular rhythm.      Pulses: Normal pulses.      Heart sounds: Normal heart sounds. No murmur heard.  Pulmonary:      Effort: Pulmonary effort is normal.      Breath sounds: Normal breath sounds. No wheezing.   Abdominal:      General: Bowel sounds are normal.      Palpations: Abdomen is soft.      Tenderness: There is no abdominal tenderness. There is no guarding.   Musculoskeletal:         General: No swelling or signs of injury. Normal range of motion.      Cervical back: Normal range of motion. No rigidity.   Skin:     General: Skin is warm and " dry.      Findings: No bruising or rash.   Neurological:      General: No focal deficit present.      Mental Status: She is alert and oriented to person, place, and time.   Psychiatric:         Mood and Affect: Mood normal.         Behavior: Behavior normal.

## 2025-01-29 NOTE — ASSESSMENT & PLAN NOTE
SSRIs  Abilify:   Duloxetine:    Does not wish to do meds      Orders:    Ambulatory referral to Psych Services; Future

## 2025-01-29 NOTE — PROGRESS NOTES
Name: Rafaela Chiang      : 1990      MRN: [id number]  Encounter Provider: Saray King MD  Encounter Date: 2025   Encounter department: [facility]    Assessment & Plan       Preventive health issues were discussed with patient, and age appropriate screening tests were ordered as noted in patient's After Visit Summary. Personalized health advice and appropriate referrals for health education or preventive services given if needed, as noted in patient's After Visit Summary.    History of Present Illness     HPI   Patient Care Team:  Saray King MD as PCP - General (Family Medicine)  Ruddy Lopez MD as PCP - PCP-[facility] ([facility])    Review of Systems  Medical History Reviewed by provider this encounter:       Annual Wellness Visit Questionnaire       PREVENTIVE SCREENINGS        Diabetes Screening:     General: Screening Current      Breast Cancer Screening:     General: Screening Not Indicated      Cervical Cancer Screening:    General: Screening Current      Lung Cancer Screening:     General: Screening Not Indicated      Hepatitis C Screening:    General: Screening Current    Social Drivers of Health     Financial Resource Strain: Low Risk  (2023)    [facility]    Overall Financial Resource Strain (CARDIA)    • Difficulty of Paying Living Expenses: Not hard at all   Food Insecurity: No Food Insecurity (2023)    [facility]    Hunger Vital Sign    • Worried About Running Out of Food in the Last Year: Never true    • Ran Out of Food in the Last Year: Never true   Transportation Needs: Unmet Transportation Needs (2023)    [facility]    PRAPARE - Transportation    • Lack of Transportation (Medical): Yes    • Lack of Transportation  "(Non-Medical): Yes   Housing Stability: Low Risk  (12/4/2023)    [facility]    Housing Stability Vital Sign    • Unable to Pay for Housing in the Last Year: No    • Number of Places Lived in the Last Year: 1    • Unstable Housing in the Last Year: No     No results found.    Objective   /76 (BP Location: Right arm, Patient Position: Sitting, Cuff Size: Large)   Pulse 96   Resp 18   Ht 5' 2\" (1.575 m)   Wt 90.7 kg (200 lb)   SpO2 98%   BMI 36.58 kg/m²     Physical Exam    "

## 2025-01-29 NOTE — PATIENT INSTRUCTIONS
Medicare Preventive Visit Patient Instructions  Thank you for completing your Welcome to Medicare Visit or Medicare Annual Wellness Visit today. Your next wellness visit will be due in one year (2/16/2026).  The screening/preventive services that you may require over the next 5-10 years are detailed below. Some tests may not apply to you based off risk factors and/or age. Screening tests ordered at today's visit but not completed yet may show as past due. Also, please note that scanned in results may not display below.  Preventive Screenings:  Service Recommendations Previous Testing/Comments   Colorectal Cancer Screening  * Colonoscopy    * Fecal Occult Blood Test (FOBT)/Fecal Immunochemical Test (FIT)  * Fecal DNA/Cologuard Test  * Flexible Sigmoidoscopy Age: 45-75 years old   Colonoscopy: every 10 years (may be performed more frequently if at higher risk)  OR  FOBT/FIT: every 1 year  OR  Cologuard: every 3 years  OR  Sigmoidoscopy: every 5 years  Screening may be recommended earlier than age 45 if at higher risk for colorectal cancer. Also, an individualized decision between you and your healthcare provider will decide whether screening between the ages of 76-85 would be appropriate. Colonoscopy: Not on file  FOBT/FIT: Not on file  Cologuard: Not on file  Sigmoidoscopy: Not on file          Breast Cancer Screening Age: 40+ years old  Frequency: every 1-2 years  Not required if history of left and right mastectomy Mammogram: Not on file    Screening Not Indicated  Screening Not Indicated   Cervical Cancer Screening Between the ages of 21-29, pap smear recommended once every 3 years.   Between the ages of 30-65, can perform pap smear with HPV co-testing every 5 years.   Recommendations may differ for women with a history of total hysterectomy, cervical cancer, or abnormal pap smears in past. Pap Smear: 03/28/2023    Screening Current  Screening Current   Hepatitis C Screening Once for adults born between 1945 and  1965  More frequently in patients at high risk for Hepatitis C Hep C Antibody: 11/11/2021    Screening Current  Screening Current   Diabetes Screening 1-2 times per year if you're at risk for diabetes or have pre-diabetes Fasting glucose: No results in last 5 years (No results in last 5 years)  A1C: 5.4 % (7/31/2023)  Screening Current  Screening Current   Cholesterol Screening Once every 5 years if you don't have a lipid disorder. May order more often based on risk factors. Lipid panel: Not on file          Other Preventive Screenings Covered by Medicare:  Abdominal Aortic Aneurysm (AAA) Screening: covered once if your at risk. You're considered to be at risk if you have a family history of AAA.  Lung Cancer Screening: covers low dose CT scan once per year if you meet all of the following conditions: (1) Age 55-77; (2) No signs or symptoms of lung cancer; (3) Current smoker or have quit smoking within the last 15 years; (4) You have a tobacco smoking history of at least 20 pack years (packs per day multiplied by number of years you smoked); (5) You get a written order from a healthcare provider.  Glaucoma Screening: covered annually if you're considered high risk: (1) You have diabetes OR (2) Family history of glaucoma OR (3)  aged 50 and older OR (4)  American aged 65 and older  Osteoporosis Screening: covered every 2 years if you meet one of the following conditions: (1) You're estrogen deficient and at risk for osteoporosis based off medical history and other findings; (2) Have a vertebral abnormality; (3) On glucocorticoid therapy for more than 3 months; (4) Have primary hyperparathyroidism; (5) On osteoporosis medications and need to assess response to drug therapy.   Last bone density test (DXA Scan): Not on file.  HIV Screening: covered annually if you're between the age of 15-65. Also covered annually if you are younger than 15 and older than 65 with risk factors for HIV infection.  For pregnant patients, it is covered up to 3 times per pregnancy.    Immunizations:  Immunization Recommendations   Influenza Vaccine Annual influenza vaccination during flu season is recommended for all persons aged >= 6 months who do not have contraindications   Pneumococcal Vaccine   * Pneumococcal conjugate vaccine = PCV13 (Prevnar 13), PCV15 (Vaxneuvance), PCV20 (Prevnar 20)  * Pneumococcal polysaccharide vaccine = PPSV23 (Pneumovax) Adults 19-65 yo with certain risk factors or if 65+ yo  If never received any pneumonia vaccine: recommend Prevnar 20 (PCV20)  Give PCV20 if previously received 1 dose of PCV13 or PPSV23   Hepatitis B Vaccine 3 dose series if at intermediate or high risk (ex: diabetes, end stage renal disease, liver disease)   Respiratory syncytial virus (RSV) Vaccine - COVERED BY MEDICARE PART D  * RSVPreF3 (Arexvy) CDC recommends that adults 60 years of age and older may receive a single dose of RSV vaccine using shared clinical decision-making (SCDM)   Tetanus (Td) Vaccine - COST NOT COVERED BY MEDICARE PART B Following completion of primary series, a booster dose should be given every 10 years to maintain immunity against tetanus. Td may also be given as tetanus wound prophylaxis.   Tdap Vaccine - COST NOT COVERED BY MEDICARE PART B Recommended at least once for all adults. For pregnant patients, recommended with each pregnancy.   Shingles Vaccine (Shingrix) - COST NOT COVERED BY MEDICARE PART B  2 shot series recommended in those 19 years and older who have or will have weakened immune systems or those 50 years and older     Health Maintenance Due:      Topic Date Due   • Cervical Cancer Screening  03/28/2028   • HIV Screening  Completed   • Hepatitis C Screening  Completed     Immunizations Due:      Topic Date Due   • HPV Vaccine (3 - 3-dose series) 10/15/2010   • Hepatitis A Vaccine (1 of 2 - Risk 2-dose series) 12/02/2011   • Pneumococcal Vaccine: Pediatrics (0 to 5 Years) and At-Risk  Patients (6 to 64 Years) (2 of 2 - PCV) 02/28/2017   • Influenza Vaccine (1) 09/01/2024   • COVID-19 Vaccine (1 - 2024-25 season) Never done     Advance Directives   What are advance directives?  Advance directives are legal documents that state your wishes and plans for medical care. These plans are made ahead of time in case you lose your ability to make decisions for yourself. Advance directives can apply to any medical decision, such as the treatments you want, and if you want to donate organs.   What are the types of advance directives?  There are many types of advance directives, and each state has rules about how to use them. You may choose a combination of any of the following:  Living will:  This is a written record of the treatment you want. You can also choose which treatments you do not want, which to limit, and which to stop at a certain time. This includes surgery, medicine, IV fluid, and tube feedings.   Durable power of  for healthcare (DPAHC):  This is a written record that states who you want to make healthcare choices for you when you are unable to make them for yourself. This person, called a proxy, is usually a family member or a friend. You may choose more than 1 proxy.  Do not resuscitate (DNR) order:  A DNR order is used in case your heart stops beating or you stop breathing. It is a request not to have certain forms of treatment, such as CPR. A DNR order may be included in other types of advance directives.  Medical directive:  This covers the care that you want if you are in a coma, near death, or unable to make decisions for yourself. You can list the treatments you want for each condition. Treatment may include pain medicine, surgery, blood transfusions, dialysis, IV or tube feedings, and a ventilator (breathing machine).  Values history:  This document has questions about your views, beliefs, and how you feel and think about life. This information can help others choose the care  that you would choose.  Why are advance directives important?  An advance directive helps you control your care. Although spoken wishes may be used, it is better to have your wishes written down. Spoken wishes can be misunderstood, or not followed. Treatments may be given even if you do not want them. An advance directive may make it easier for your family to make difficult choices about your care.   Urinary Incontinence   Urinary incontinence (UI)  is when you lose control of your bladder. UI develops because your bladder cannot store or empty urine properly. The 3 most common types of UI are stress incontinence, urge incontinence, or both.  Medicines:   May be given to help strengthen your bladder control. Report any side effects of medication to your healthcare provider.  Do pelvic muscle exercises often:  Your pelvic muscles help you stop urinating. Squeeze these muscles tight for 5 seconds, then relax for 5 seconds. Gradually work up to squeezing for 10 seconds. Do 3 sets of 15 repetitions a day, or as directed. This will help strengthen your pelvic muscles and improve bladder control.  Train your bladder:  Go to the bathroom at set times, such as every 2 hours, even if you do not feel the urge to go. You can also try to hold your urine when you feel the urge to go. For example, hold your urine for 5 minutes when you feel the urge to go. As that becomes easier, hold your urine for 10 minutes.   Self-care:   Keep a UI record.  Write down how often you leak urine and how much you leak. Make a note of what you were doing when you leaked urine.  Drink liquids as directed. You may need to limit the amount of liquid you drink to help control your urine leakage. Do not drink any liquid right before you go to bed. Limit or do not have drinks that contain caffeine or alcohol.   Prevent constipation.  Eat a variety of high-fiber foods. Good examples are high-fiber cereals, beans, vegetables, and whole-grain breads.  Walking is the best way to trigger your intestines to have a bowel movement.  Exercise regularly and maintain a healthy weight.  Weight loss and exercise will decrease pressure on your bladder and help you control your leakage.   Use a catheter as directed  to help empty your bladder. A catheter is a tiny, plastic tube that is put into your bladder to drain your urine.   Go to behavior therapy as directed.  Behavior therapy may be used to help you learn to control your urge to urinate.    Cigarette Smoking and Your Health   Risks to your health if you smoke:  Nicotine and other chemicals found in tobacco damage every cell in your body. Even if you are a light smoker, you have an increased risk for cancer, heart disease, and lung disease. If you are pregnant or have diabetes, smoking increases your risk for complications.   Benefits to your health if you stop smoking:   You decrease respiratory symptoms such as coughing, wheezing, and shortness of breath.   You reduce your risk for cancers of the lung, mouth, throat, kidney, bladder, pancreas, stomach, and cervix. If you already have cancer, you increase the benefits of chemotherapy. You also reduce your risk for cancer returning or a second cancer from developing.   You reduce your risk for heart disease, blood clots, heart attack, and stroke.   You reduce your risk for lung infections, and diseases such as pneumonia, asthma, chronic bronchitis, and emphysema.  Your circulation improves. More oxygen can be delivered to your body. If you have diabetes, you lower your risk for complications, such as kidney, artery, and eye diseases. You also lower your risk for nerve damage. Nerve damage can lead to amputations, poor vision, and blindness.  You improve your body's ability to heal and to fight infections.  For more information and support to stop smoking:   [employer]  Phone: 8- 554 - 131-9851  Web Address: www.[employer].gov  Weight Management   Why it is important  to manage your weight:  Being overweight increases your risk of health conditions such as heart disease, high blood pressure, type 2 diabetes, and certain types of cancer. It can also increase your risk for osteoarthritis, sleep apnea, and other respiratory problems. Aim for a slow, steady weight loss. Even a small amount of weight loss can lower your risk of health problems.  How to lose weight safely:  A safe and healthy way to lose weight is to eat fewer calories and get regular exercise. You can lose up about 1 pound a week by decreasing the number of calories you eat by 500 calories each day.   Healthy meal plan for weight management:  A healthy meal plan includes a variety of foods, contains fewer calories, and helps you stay healthy. A healthy meal plan includes the following:  Eat whole-grain foods more often.  A healthy meal plan should contain fiber. Fiber is the part of grains, fruits, and vegetables that is not broken down by your body. Whole-grain foods are healthy and provide extra fiber in your diet. Some examples of whole-grain foods are whole-wheat breads and pastas, oatmeal, brown rice, and bulgur.  Eat a variety of vegetables every day.  Include dark, leafy greens such as spinach, kale, sean greens, and mustard greens. Eat yellow and orange vegetables such as carrots, sweet potatoes, and winter squash.   Eat a variety of fruits every day.  Choose fresh or canned fruit (canned in its own juice or light syrup) instead of juice. Fruit juice has very little or no fiber.  Eat low-fat dairy foods.  Drink fat-free (skim) milk or 1% milk. Eat fat-free yogurt and low-fat cottage cheese. Try low-fat cheeses such as mozzarella and other reduced-fat cheeses.  Choose meat and other protein foods that are low in fat.  Choose beans or other legumes such as split peas or lentils. Choose fish, skinless poultry (chicken or turkey), or lean cuts of red meat (beef or pork). Before you cook meat or poultry, cut off  any visible fat.   Use less fat and oil.  Try baking foods instead of frying them. Add less fat, such as margarine, sour cream, regular salad dressing and mayonnaise to foods. Eat fewer high-fat foods. Some examples of high-fat foods include french fries, doughnuts, ice cream, and cakes.  Eat fewer sweets.  Limit foods and drinks that are high in sugar. This includes candy, cookies, regular soda, and sweetened drinks.  Exercise:  Exercise at least 30 minutes per day on most days of the week. Some examples of exercise include walking, biking, dancing, and swimming. You can also fit in more physical activity by taking the stairs instead of the elevator or parking farther away from stores. Ask your healthcare provider about the best exercise plan for you.      © Copyright [employer] 2018 Information is for End User's use only and may not be sold, redistributed or otherwise used for commercial purposes. All illustrations and images included in CareNotes® are the copyrighted property of [employer] or [employer]      Medicare Preventive Visit Patient Instructions  Thank you for completing your Welcome to Medicare Visit or Medicare Annual Wellness Visit today. Your next wellness visit will be due in one year (2/16/2026).  The screening/preventive services that you may require over the next 5-10 years are detailed below. Some tests may not apply to you based off risk factors and/or age. Screening tests ordered at today's visit but not completed yet may show as past due. Also, please note that scanned in results may not display below.  Preventive Screenings:  Service Recommendations Previous Testing/Comments   Colorectal Cancer Screening  * Colonoscopy    * Fecal Occult Blood Test (FOBT)/Fecal Immunochemical Test (FIT)  * Fecal DNA/Cologuard Test  * Flexible Sigmoidoscopy Age: 45-75 years old   Colonoscopy: every 10 years (may be performed more frequently if at higher risk)  OR  FOBT/FIT: every 1 year   OR  Cologuard: every 3 years  OR  Sigmoidoscopy: every 5 years  Screening may be recommended earlier than age 45 if at higher risk for colorectal cancer. Also, an individualized decision between you and your healthcare provider will decide whether screening between the ages of 76-85 would be appropriate. Colonoscopy: Not on file  FOBT/FIT: Not on file  Cologuard: Not on file  Sigmoidoscopy: Not on file          Breast Cancer Screening Age: 40+ years old  Frequency: every 1-2 years  Not required if history of left and right mastectomy Mammogram: Not on file    Screening Not Indicated  Screening Not Indicated   Cervical Cancer Screening Between the ages of 21-29, pap smear recommended once every 3 years.   Between the ages of 30-65, can perform pap smear with HPV co-testing every 5 years.   Recommendations may differ for women with a history of total hysterectomy, cervical cancer, or abnormal pap smears in past. Pap Smear: 03/28/2023    Screening Current  Screening Current   Hepatitis C Screening Once for adults born between 1945 and 1965  More frequently in patients at high risk for Hepatitis C Hep C Antibody: 11/11/2021    Screening Current  Screening Current   Diabetes Screening 1-2 times per year if you're at risk for diabetes or have pre-diabetes Fasting glucose: No results in last 5 years (No results in last 5 years)  A1C: 5.4 % (7/31/2023)  Screening Current  Screening Current   Cholesterol Screening Once every 5 years if you don't have a lipid disorder. May order more often based on risk factors. Lipid panel: Not on file          Other Preventive Screenings Covered by Medicare:  Abdominal Aortic Aneurysm (AAA) Screening: covered once if your at risk. You're considered to be at risk if you have a family history of AAA.  Lung Cancer Screening: covers low dose CT scan once per year if you meet all of the following conditions: (1) Age 55-77; (2) No signs or symptoms of lung cancer; (3) Current smoker or have quit  smoking within the last 15 years; (4) You have a tobacco smoking history of at least 20 pack years (packs per day multiplied by number of years you smoked); (5) You get a written order from a healthcare provider.  Glaucoma Screening: covered annually if you're considered high risk: (1) You have diabetes OR (2) Family history of glaucoma OR (3)  aged 50 and older OR (4)  American aged 65 and older  Osteoporosis Screening: covered every 2 years if you meet one of the following conditions: (1) You're estrogen deficient and at risk for osteoporosis based off medical history and other findings; (2) Have a vertebral abnormality; (3) On glucocorticoid therapy for more than 3 months; (4) Have primary hyperparathyroidism; (5) On osteoporosis medications and need to assess response to drug therapy.   Last bone density test (DXA Scan): Not on file.  HIV Screening: covered annually if you're between the age of 15-65. Also covered annually if you are younger than 15 and older than 65 with risk factors for HIV infection. For pregnant patients, it is covered up to 3 times per pregnancy.    Immunizations:  Immunization Recommendations   Influenza Vaccine Annual influenza vaccination during flu season is recommended for all persons aged >= 6 months who do not have contraindications   Pneumococcal Vaccine   * Pneumococcal conjugate vaccine = PCV13 (Prevnar 13), PCV15 (Vaxneuvance), PCV20 (Prevnar 20)  * Pneumococcal polysaccharide vaccine = PPSV23 (Pneumovax) Adults 19-65 yo with certain risk factors or if 65+ yo  If never received any pneumonia vaccine: recommend Prevnar 20 (PCV20)  Give PCV20 if previously received 1 dose of PCV13 or PPSV23   Hepatitis B Vaccine 3 dose series if at intermediate or high risk (ex: diabetes, end stage renal disease, liver disease)   Respiratory syncytial virus (RSV) Vaccine - COVERED BY MEDICARE PART D  * RSVPreF3 (Arexvy) CDC recommends that adults 60 years of age and older  may receive a single dose of RSV vaccine using shared clinical decision-making (SCDM)   Tetanus (Td) Vaccine - COST NOT COVERED BY MEDICARE PART B Following completion of primary series, a booster dose should be given every 10 years to maintain immunity against tetanus. Td may also be given as tetanus wound prophylaxis.   Tdap Vaccine - COST NOT COVERED BY MEDICARE PART B Recommended at least once for all adults. For pregnant patients, recommended with each pregnancy.   Shingles Vaccine (Shingrix) - COST NOT COVERED BY MEDICARE PART B  2 shot series recommended in those 19 years and older who have or will have weakened immune systems or those 50 years and older     Health Maintenance Due:      Topic Date Due   • Cervical Cancer Screening  03/28/2028   • HIV Screening  Completed   • Hepatitis C Screening  Completed     Immunizations Due:      Topic Date Due   • HPV Vaccine (3 - 3-dose series) 10/15/2010   • Hepatitis A Vaccine (1 of 2 - Risk 2-dose series) 12/02/2011   • Pneumococcal Vaccine: Pediatrics (0 to 5 Years) and At-Risk Patients (6 to 64 Years) (2 of 2 - PCV) 02/28/2017   • Influenza Vaccine (1) 09/01/2024   • COVID-19 Vaccine (1 - 2024-25 season) Never done     Advance Directives   What are advance directives?  Advance directives are legal documents that state your wishes and plans for medical care. These plans are made ahead of time in case you lose your ability to make decisions for yourself. Advance directives can apply to any medical decision, such as the treatments you want, and if you want to donate organs.   What are the types of advance directives?  There are many types of advance directives, and each state has rules about how to use them. You may choose a combination of any of the following:  Living will:  This is a written record of the treatment you want. You can also choose which treatments you do not want, which to limit, and which to stop at a certain time. This includes surgery, medicine, IV  fluid, and tube feedings.   Durable power of  for healthcare (DPAHC):  This is a written record that states who you want to make healthcare choices for you when you are unable to make them for yourself. This person, called a proxy, is usually a family member or a friend. You may choose more than 1 proxy.  Do not resuscitate (DNR) order:  A DNR order is used in case your heart stops beating or you stop breathing. It is a request not to have certain forms of treatment, such as CPR. A DNR order may be included in other types of advance directives.  Medical directive:  This covers the care that you want if you are in a coma, near death, or unable to make decisions for yourself. You can list the treatments you want for each condition. Treatment may include pain medicine, surgery, blood transfusions, dialysis, IV or tube feedings, and a ventilator (breathing machine).  Values history:  This document has questions about your views, beliefs, and how you feel and think about life. This information can help others choose the care that you would choose.  Why are advance directives important?  An advance directive helps you control your care. Although spoken wishes may be used, it is better to have your wishes written down. Spoken wishes can be misunderstood, or not followed. Treatments may be given even if you do not want them. An advance directive may make it easier for your family to make difficult choices about your care.   Urinary Incontinence   Urinary incontinence (UI)  is when you lose control of your bladder. UI develops because your bladder cannot store or empty urine properly. The 3 most common types of UI are stress incontinence, urge incontinence, or both.  Medicines:   May be given to help strengthen your bladder control. Report any side effects of medication to your healthcare provider.  Do pelvic muscle exercises often:  Your pelvic muscles help you stop urinating. Squeeze these muscles tight for 5  seconds, then relax for 5 seconds. Gradually work up to squeezing for 10 seconds. Do 3 sets of 15 repetitions a day, or as directed. This will help strengthen your pelvic muscles and improve bladder control.  Train your bladder:  Go to the bathroom at set times, such as every 2 hours, even if you do not feel the urge to go. You can also try to hold your urine when you feel the urge to go. For example, hold your urine for 5 minutes when you feel the urge to go. As that becomes easier, hold your urine for 10 minutes.   Self-care:   Keep a UI record.  Write down how often you leak urine and how much you leak. Make a note of what you were doing when you leaked urine.  Drink liquids as directed. You may need to limit the amount of liquid you drink to help control your urine leakage. Do not drink any liquid right before you go to bed. Limit or do not have drinks that contain caffeine or alcohol.   Prevent constipation.  Eat a variety of high-fiber foods. Good examples are high-fiber cereals, beans, vegetables, and whole-grain breads. Walking is the best way to trigger your intestines to have a bowel movement.  Exercise regularly and maintain a healthy weight.  Weight loss and exercise will decrease pressure on your bladder and help you control your leakage.   Use a catheter as directed  to help empty your bladder. A catheter is a tiny, plastic tube that is put into your bladder to drain your urine.   Go to behavior therapy as directed.  Behavior therapy may be used to help you learn to control your urge to urinate.    Cigarette Smoking and Your Health   Risks to your health if you smoke:  Nicotine and other chemicals found in tobacco damage every cell in your body. Even if you are a light smoker, you have an increased risk for cancer, heart disease, and lung disease. If you are pregnant or have diabetes, smoking increases your risk for complications.   Benefits to your health if you stop smoking:   You decrease  respiratory symptoms such as coughing, wheezing, and shortness of breath.   You reduce your risk for cancers of the lung, mouth, throat, kidney, bladder, pancreas, stomach, and cervix. If you already have cancer, you increase the benefits of chemotherapy. You also reduce your risk for cancer returning or a second cancer from developing.   You reduce your risk for heart disease, blood clots, heart attack, and stroke.   You reduce your risk for lung infections, and diseases such as pneumonia, asthma, chronic bronchitis, and emphysema.  Your circulation improves. More oxygen can be delivered to your body. If you have diabetes, you lower your risk for complications, such as kidney, artery, and eye diseases. You also lower your risk for nerve damage. Nerve damage can lead to amputations, poor vision, and blindness.  You improve your body's ability to heal and to fight infections.  For more information and support to stop smoking:   [employer]  Phone: 9- 302 [phone] [phone]  Web Address: www.[employer]  Weight Management   Why it is important to manage your weight:  Being overweight increases your risk of health conditions such as heart disease, high blood pressure, type 2 diabetes, and certain types of cancer. It can also increase your risk for osteoarthritis, sleep apnea, and other respiratory problems. Aim for a slow, steady weight loss. Even a small amount of weight loss can lower your risk of health problems.  How to lose weight safely:  A safe and healthy way to lose weight is to eat fewer calories and get regular exercise. You can lose up about 1 pound a week by decreasing the number of calories you eat by 500 calories each day.   Healthy meal plan for weight management:  A healthy meal plan includes a variety of foods, contains fewer calories, and helps you stay healthy. A healthy meal plan includes the following:  Eat whole-grain foods more often.  A healthy meal plan should contain fiber. Fiber is the part of  grains, fruits, and vegetables that is not broken down by your body. Whole-grain foods are healthy and provide extra fiber in your diet. Some examples of whole-grain foods are whole-wheat breads and pastas, oatmeal, brown rice, and bulgur.  Eat a variety of vegetables every day.  Include dark, leafy greens such as spinach, kale, sean greens, and mustard greens. Eat yellow and orange vegetables such as carrots, sweet potatoes, and winter squash.   Eat a variety of fruits every day.  Choose fresh or canned fruit (canned in its own juice or light syrup) instead of juice. Fruit juice has very little or no fiber.  Eat low-fat dairy foods.  Drink fat-free (skim) milk or 1% milk. Eat fat-free yogurt and low-fat cottage cheese. Try low-fat cheeses such as mozzarella and other reduced-fat cheeses.  Choose meat and other protein foods that are low in fat.  Choose beans or other legumes such as split peas or lentils. Choose fish, skinless poultry (chicken or turkey), or lean cuts of red meat (beef or pork). Before you cook meat or poultry, cut off any visible fat.   Use less fat and oil.  Try baking foods instead of frying them. Add less fat, such as margarine, sour cream, regular salad dressing and mayonnaise to foods. Eat fewer high-fat foods. Some examples of high-fat foods include french fries, doughnuts, ice cream, and cakes.  Eat fewer sweets.  Limit foods and drinks that are high in sugar. This includes candy, cookies, regular soda, and sweetened drinks.  Exercise:  Exercise at least 30 minutes per day on most days of the week. Some examples of exercise include walking, biking, dancing, and swimming. You can also fit in more physical activity by taking the stairs instead of the elevator or parking farther away from stores. Ask your healthcare provider about the best exercise plan for you.      © Copyright [employer] 2018 Information is for End User's use only and may not be sold, redistributed or otherwise used  for commercial purposes. All illustrations and images included in CareNotes® are the copyrighted property of [employer] Inc. or [employer]

## 2025-02-04 ENCOUNTER — OFFICE VISIT (OUTPATIENT)
Dept: FAMILY MEDICINE CLINIC | Facility: CLINIC | Age: 35
End: 2025-02-04
Payer: COMMERCIAL

## 2025-02-04 VITALS
HEIGHT: 62 IN | OXYGEN SATURATION: 97 % | SYSTOLIC BLOOD PRESSURE: 104 MMHG | DIASTOLIC BLOOD PRESSURE: 70 MMHG | HEART RATE: 84 BPM | WEIGHT: 200 LBS | BODY MASS INDEX: 36.8 KG/M2 | RESPIRATION RATE: 18 BRPM

## 2025-02-04 DIAGNOSIS — R73.01 ELEVATED FASTING GLUCOSE: ICD-10-CM

## 2025-02-04 PROCEDURE — 99213 OFFICE O/P EST LOW 20 MIN: CPT

## 2025-02-17 ENCOUNTER — TELEMEDICINE (OUTPATIENT)
Dept: FAMILY MEDICINE CLINIC | Facility: CLINIC | Age: 35
End: 2025-02-17
Payer: COMMERCIAL

## 2025-02-17 DIAGNOSIS — U07.1 COVID-19: Primary | ICD-10-CM

## 2025-02-17 PROCEDURE — 99213 OFFICE O/P EST LOW 20 MIN: CPT | Performed by: PHYSICIAN ASSISTANT

## 2025-02-17 RX ORDER — NIRMATRELVIR AND RITONAVIR 300-100 MG
3 KIT ORAL 2 TIMES DAILY
Qty: 30 TABLET | Refills: 0 | Status: SHIPPED | OUTPATIENT
Start: 2025-02-17 | End: 2025-02-22

## 2025-02-17 NOTE — LETTER
February 17, 2025     Patient: Rafaela Peres  YOB: 1990  Date of Visit: 2/17/2025      To Whom it May Concern:    Rafaela Peres is under my professional care. Rafaela was seen in my office on 2/17/2025. Rafaela should be excused for any time missed 2/17/2025.    If you have any questions or concerns, please don't hesitate to call.         Sincerely,          Marisol Solis PA-C        CC: No Recipients

## 2025-02-17 NOTE — PROGRESS NOTES
COVID-19 Outpatient Progress Note  Name: Rafaela Peres      : 1990      MRN: [id number]  Encounter Provider: Marisol Solis PA-C  Encounter Date: 2025   [facility]  :  Assessment & Plan  COVID-19  Symptoms mild. Cont supportive care. Requesting paxlovid   Orders:    nirmatrelvir & ritonavir (Paxlovid, 300/100,) tablet therapy pack; Take 3 tablets by mouth 2 (two) times a day for 5 days    COVID-19               Disposition:     Discussed symptom directed medication options with patient. Discussed vitamin D, vitamin C, and/or zinc supplementation with patient.     Patient meets criteria for Paxlovid and they have been counseled appropriately regarding risks, benefits, side effects, and alternative treatment options. After discussion, patient agrees to treatment.    Possible side effects of Paxlovid?    Possible side effects of Paxlovid are:  - Liver Problems. Notify us right away if you start to experience loss of appetite, yellowing of your skin and the whites of eyes (jaundice), dark-colored urine, pale colored stools and itchy skin, stomach area (abdominal) pain.  - Resistance to HIV Medicines. If you have untreated HIV infection, Paxlovid may lead to some HIV medicines not working as well in the future.  - Other possible side effects include: altered sense of taste, diarrhea, high blood pressure, or muscle aches.    I have spent a total time of 15 minutes on the day of the encounter for this patient including discussing diagnostic results, discussing prognosis, risks and benefits of treatment options, instructions for management, patient and family education, importance of treatment compliance, risk factor reductions, impressions, counseling/coordination of care and documenting in the medical record.          Recent Visits  No visits were found meeting these conditions.  Showing recent visits within past 7 days and meeting all other  requirements  Today's Visits  Date Type Provider Dept   02/17/25 Telemedicine Marisol Solis PA-C [facility]   Showing today's visits and meeting all other requirements  Future Appointments  No visits were found meeting these conditions.  Showing future appointments within next 150 days and meeting all other requirements    History of Present Illness       Patient agrees to participate in a virtual check in via telephone or video visit instead of presenting to the office to address urgent/immediate medical needs. Patient is aware this is a billable service. She acknowledged consent and understanding of privacy and security of the video platform. The patient has agreed to participate and understands they can discontinue the visit at any time.    After connecting through [employer], the patient was identified by name and date of birth. Rafaela Peres was informed that this was a telemedicine visit and that the exam was being conducted confidentially over secure lines. Rafaela Peres acknowledged consent and understanding of privacy and security of the telemedicine visit. I informed the patient that I have reviewed her record in Epic and presented the opportunity for her to ask any questions regarding the visit today. The patient agreed to participate.     Verification of patient location:  Patient is located in the following state in which I hold an active license: [place]    Subjective:   Rafaela Peres is a 34 y.o. female who is concerned about COVID-19. Patient's symptoms include nasal congestion and headache.         COVID-19 vaccination status: Not vaccinated    Exposure:   Contact with a person who is under investigation (PUI) for or who is positive for COVID-19 within the last 14 days?: Yes    Symptoms mild  Relived with tylenol       Lab Results   Component Value Date    SARSCOV2 Positive (A) 01/24/2022    SARSCOV2 Positive (A) 01/27/2021    SARSCOVAG Negative 12/10/2024       Review  of Systems   HENT:  Positive for congestion.    Neurological:  Positive for headaches.     Objective   There were no vitals taken for this visit.    Physical Exam  Constitutional:       Appearance: Normal appearance. She is normal weight.   Pulmonary:      Effort: Pulmonary effort is normal.   Neurological:      Mental Status: She is oriented to person, place, and time.   Psychiatric:         Mood and Affect: Mood normal.         Behavior: Behavior normal.

## 2025-02-19 ENCOUNTER — TELEPHONE (OUTPATIENT)
Age: 35
End: 2025-02-19

## 2025-02-19 NOTE — TELEPHONE ENCOUNTER
Contacted patient in regards to Routine Referral in attempts to verify patient's needs of services and add patient to proper wait list. Spoke to patient who stated that she was too far from all locations. Writer offered additional resources to be sent via [employer], patient accepted.    Referral closed

## 2025-02-26 ENCOUNTER — TELEMEDICINE (OUTPATIENT)
Dept: FAMILY MEDICINE CLINIC | Facility: CLINIC | Age: 35
End: 2025-02-26
Payer: COMMERCIAL

## 2025-02-26 DIAGNOSIS — J02.0 PHARYNGITIS DUE TO STREPTOCOCCUS SPECIES: Primary | ICD-10-CM

## 2025-02-26 LAB
CONTROL: ABNORMAL
S PYO AG THROAT QL: POSITIVE

## 2025-02-26 PROCEDURE — 99214 OFFICE O/P EST MOD 30 MIN: CPT | Performed by: PHYSICIAN ASSISTANT

## 2025-02-26 RX ORDER — METHYLPREDNISOLONE 4 MG/1
TABLET ORAL
Qty: 21 EACH | Refills: 0 | Status: SHIPPED | OUTPATIENT
Start: 2025-02-26

## 2025-02-26 RX ORDER — AMOXICILLIN 500 MG/1
500 CAPSULE ORAL EVERY 12 HOURS SCHEDULED
Qty: 20 CAPSULE | Refills: 0 | Status: SHIPPED | OUTPATIENT
Start: 2025-02-26 | End: 2025-03-08

## 2025-02-26 NOTE — PROGRESS NOTES
Name: Rafaela Peres      : 1990      MRN: [id number]  Encounter Provider: Marisol Solis PA-C  Encounter Date: 2025   [facility]  :  Assessment & Plan  Pharyngitis due to Streptococcus species    Orders:    POCT rapid ANTIGEN strepA    amoxicillin (AMOXIL) 500 mg capsule; Take 1 capsule (500 mg total) by mouth every 12 (twelve) hours for 10 days    methylPREDNISolone 4 MG tablet therapy pack; Use as directed on package           History of Present Illness   35 y/o female initially presents via telemedicine with acute on set of headache, sore throat, neck pain, fevers, chills, fatigue. No cough, congestion, N/V/D. Advised to come to office for strep test along with daughter as she is having similar concerns.       Review of Systems   Constitutional:  Positive for activity change, appetite change, chills, fatigue and fever.   HENT:  Positive for sore throat. Negative for congestion, postnasal drip, rhinorrhea, sinus pressure, sinus pain and sneezing.    Respiratory:  Negative for cough and shortness of breath.    Cardiovascular:  Negative for chest pain.       Objective   There were no vitals taken for this visit.     Physical Exam  Administrative Statements   I have spent a total time of 25 minutes in caring for this patient on the day of the visit/encounter including Diagnostic results, Risks and benefits of tx options, Instructions for management, Impressions, Counseling / Coordination of care, Reviewing/placing orders in the medical record (including tests, medications, and/or procedures), and Obtaining or reviewing history  .

## 2025-02-26 NOTE — LETTER
February 26, 2025     Patient: Rafaela Peres  YOB: 1990  Date of Visit: 2/26/2025      To Whom it May Concern:    Rafaela Peres is under my professional care. Rafaela was seen in my office on 2/26/2025. Rafaela should be excused for any time missed 2/26/25.    If you have any questions or concerns, please don't hesitate to call.         Sincerely,          Marisol Solis PA-C        CC: No Recipients

## 2025-03-04 ENCOUNTER — OFFICE VISIT (OUTPATIENT)
Dept: FAMILY MEDICINE CLINIC | Facility: CLINIC | Age: 35
End: 2025-03-04
Payer: COMMERCIAL

## 2025-03-04 VITALS
SYSTOLIC BLOOD PRESSURE: 126 MMHG | RESPIRATION RATE: 18 BRPM | TEMPERATURE: 98.7 F | WEIGHT: 198 LBS | BODY MASS INDEX: 36.44 KG/M2 | OXYGEN SATURATION: 97 % | DIASTOLIC BLOOD PRESSURE: 70 MMHG | HEIGHT: 62 IN | HEART RATE: 85 BPM

## 2025-03-04 DIAGNOSIS — R51.9 SCALP PAIN: Primary | ICD-10-CM

## 2025-03-04 PROCEDURE — 99213 OFFICE O/P EST LOW 20 MIN: CPT

## 2025-03-05 ENCOUNTER — TELEMEDICINE (OUTPATIENT)
Dept: FAMILY MEDICINE CLINIC | Facility: CLINIC | Age: 35
End: 2025-03-05
Payer: COMMERCIAL

## 2025-03-05 DIAGNOSIS — A05.9 FOOD POISONING: ICD-10-CM

## 2025-03-05 PROCEDURE — 99213 OFFICE O/P EST LOW 20 MIN: CPT

## 2025-03-05 RX ORDER — ONDANSETRON 4 MG/1
4 TABLET, FILM COATED ORAL EVERY 6 HOURS PRN
Qty: 30 TABLET | Refills: 2 | Status: SHIPPED | OUTPATIENT
Start: 2025-03-05

## 2025-03-05 NOTE — PROGRESS NOTES
Virtual Regular VisitName: Rafaela Peres      : 1990      MRN: [id number]  Encounter Provider: Saray King MD  Encounter Date: 3/5/2025   [facility]  :  Assessment & Plan  Food poisoning    Orders:    ondansetron (ZOFRAN) 4 mg tablet; Take 1 tablet (4 mg total) by mouth every 6 (six) hours as needed for nausea or vomiting        History of Present Illness     Ms Peres is being seen via telemed because of acute gastroenteritis symptoms. Symptoms of N/V and diarrhea began yesterday. No other new symptoms. Patient trying to keep down sips of fluid. Multiple members of the household w similar symptoms. Advised on supportive care, especially maintaining adequate hydration. Will prescribe Zofran for nausea. Return precautions reviewed.       Review of Systems   Constitutional:  Positive for activity change, appetite change and fatigue. Negative for chills and fever.   HENT:  Negative for ear pain and sore throat.    Eyes:  Negative for pain and visual disturbance.   Respiratory:  Negative for cough and shortness of breath.    Cardiovascular:  Negative for chest pain and palpitations.   Gastrointestinal:  Positive for abdominal pain, diarrhea, nausea and vomiting. Negative for abdominal distention, blood in stool and constipation.   Genitourinary:  Negative for dysuria and hematuria.   Musculoskeletal:  Negative for arthralgias and back pain.   Skin:  Negative for color change and rash.   Neurological:  Negative for seizures, syncope and headaches.   Psychiatric/Behavioral:  Negative for agitation, confusion and sleep disturbance.    All other systems reviewed and are negative.      Objective   There were no vitals taken for this visit.    Physical Exam  Constitutional:       General: She is not in acute distress.     Appearance: Normal appearance. She is not ill-appearing.   HENT:      Head: Atraumatic.      Right Ear: External ear normal.       Left Ear: External ear normal.      Nose: Nose normal.      Mouth/Throat:      Pharynx: Oropharynx is clear.   Eyes:      Extraocular Movements: Extraocular movements intact.   Pulmonary:      Effort: Pulmonary effort is normal.   Musculoskeletal:      Cervical back: Normal range of motion.   Neurological:      Mental Status: She is alert and oriented to person, place, and time.   Psychiatric:         Mood and Affect: Mood normal.         Behavior: Behavior normal.         Administrative Statements   Encounter provider Saray King MD    The Patient is located at Home and in the following state in which I hold an active license [place].    The patient was identified by name and date of birth. Rafaela Peres was informed that this is a telemedicine visit and that the visit is being conducted through the Epic Embedded platform. She agrees to proceed..  My office door was closed. No one else was in the room.  She acknowledged consent and understanding of privacy and security of the video platform. The patient has agreed to participate and understands they can discontinue the visit at any time.    I have spent a total time of 13 minutes in caring for this patient on the day of the visit/encounter including Prognosis, Risks and benefits of tx options, Instructions for management, Patient and family education, Documenting in the medical record, Reviewing/placing orders in the medical record (including tests, medications, and/or procedures), and Obtaining or reviewing history  , not including the time spent for establishing the audio/video connection.

## 2025-03-07 ENCOUNTER — APPOINTMENT (OUTPATIENT)
Dept: RADIOLOGY | Facility: CLINIC | Age: 35
End: 2025-03-07
Payer: COMMERCIAL

## 2025-03-07 ENCOUNTER — RESULTS FOLLOW-UP (OUTPATIENT)
Dept: URGENT CARE | Facility: CLINIC | Age: 35
End: 2025-03-07

## 2025-03-07 ENCOUNTER — OFFICE VISIT (OUTPATIENT)
Dept: URGENT CARE | Facility: CLINIC | Age: 35
End: 2025-03-07
Payer: COMMERCIAL

## 2025-03-07 VITALS
OXYGEN SATURATION: 98 % | TEMPERATURE: 98.6 F | DIASTOLIC BLOOD PRESSURE: 62 MMHG | RESPIRATION RATE: 16 BRPM | HEART RATE: 86 BPM | SYSTOLIC BLOOD PRESSURE: 120 MMHG

## 2025-03-07 DIAGNOSIS — S99.922A FOOT INJURY, LEFT, INITIAL ENCOUNTER: ICD-10-CM

## 2025-03-07 DIAGNOSIS — S92.515A CLOSED NONDISPLACED FRACTURE OF PROXIMAL PHALANX OF LESSER TOE OF LEFT FOOT, INITIAL ENCOUNTER: Primary | ICD-10-CM

## 2025-03-07 PROCEDURE — G0463 HOSPITAL OUTPT CLINIC VISIT: HCPCS | Performed by: PHYSICIAN ASSISTANT

## 2025-03-07 PROCEDURE — 73630 X-RAY EXAM OF FOOT: CPT

## 2025-03-07 PROCEDURE — 99213 OFFICE O/P EST LOW 20 MIN: CPT | Performed by: PHYSICIAN ASSISTANT

## 2025-03-07 NOTE — PATIENT INSTRUCTIONS
Apply ice several times a day for 48 hours from time of injury  Take ibuprofen or Tylenol as needed for pain  Buddy tape toes have for comfort measures  Wear good supportive shoes  If symptoms worsen follow-up with podiatry    If tests have been performed at [facility], our office will contact you with results if changes need to be made to the care plan discussed with you at the visit.  You can review your full results on [facility]

## 2025-03-07 NOTE — PROGRESS NOTES
[facility]        NAME: Rafaela Peres is a 34 y.o. female  : 1990    MRN: [id number]  DATE: 2025  TIME: 8:37 AM    Assessment and Plan   Closed nondisplaced fracture of proximal phalanx of lesser toe of left foot, initial encounter [S92.515A]  1. Closed nondisplaced fracture of proximal phalanx of lesser toe of left foot, initial encounter        2. Foot injury, left, initial encounter  XR foot 3+ vw left            Patient Instructions     Apply ice several times a day for 48 hours from time of injury  Take ibuprofen or Tylenol as needed for pain  Buddy tape toes have for comfort measures  Wear good supportive shoes  If symptoms worsen follow-up with podiatry    Follow up with PCP in 3-5 days.  Proceed to  ER if symptoms worsen.    If tests have been performed at [facility], our office will contact you with results if changes need to be made to the care plan discussed with you at the visit.  [facility]    Chief Complaint     Chief Complaint   Patient presents with    Toe Injury     Left pinky toe injury hit into the crib         History of Present Illness       Patient presents with a pain of her left foot which started yesterday after she struck her foot on a crib.  She been taking Tylenol for pain; she did not apply ice. Medical history reviewed.        Review of Systems   Review of Systems   Constitutional:  Negative for fever.   Musculoskeletal:         Left foot pain   Skin:  Negative for wound.   Neurological:  Negative for weakness and numbness.         Current Medications       Current Outpatient Medications:     albuterol (PROVENTIL HFA,VENTOLIN HFA) 90 mcg/act inhaler, Inhale 2 puffs every 6 (six) hours as needed for wheezing or shortness of breath, Disp: 18 g, Rfl: 1    amoxicillin (AMOXIL) 500 mg capsule, Take 1 capsule (500 mg total) by mouth every 12 (twelve) hours for 10 days, Disp: 20 capsule, Rfl: 0    cetirizine (ZyrTEC)  10 mg tablet, Take 1 tablet (10 mg total) by mouth daily, Disp: 30 tablet, Rfl: 2    docosanol (ABREVA) 10 %, Apply topically 5 (five) times a day, Disp: 2 g, Rfl: 3    hydrOXYzine HCL (ATARAX) 25 mg tablet, Take 1 tablet (25 mg total) by mouth every 6 (six) hours as needed for anxiety (Feelings of anxiety and being overwhelmed.), Disp: 30 tablet, Rfl: 2    ondansetron (ZOFRAN) 4 mg tablet, Take 1 tablet (4 mg total) by mouth every 6 (six) hours as needed for nausea or vomiting, Disp: 30 tablet, Rfl: 2    EPINEPHrine (EPIPEN) 0.3 mg/0.3 mL SOAJ, Inject 0.3 mL (0.3 mg total) into a muscle once for 1 dose, Disp: 0.6 mL, Rfl: 1    methylPREDNISolone 4 MG tablet therapy pack, Use as directed on package (Patient not taking: Reported on 3/7/2025), Disp: 21 each, Rfl: 0    Current Allergies     Allergies as of 03/07/2025 - Reviewed 03/07/2025   Allergen Reaction Noted    Bee venom  07/27/2019    Bupropion Other (See Comments) 08/19/2018            The following portions of the patient's history were reviewed and updated as appropriate: allergies, current medications, past family history, past medical history, past social history, past surgical history and problem list.     Past Medical History:   Diagnosis Date    Abscess 12/18/2019    Asthma     Bipolar 1 disorder (HCC)     BV (bacterial vaginosis) 10/12/2023    Candidiasis 10/12/2023    Closed right radial fracture     Dichorionic diamniotic twin pregnancy 06/01/2023    Last Assessment & Plan: Formatting of this note might be different from the original. US reviewed, normal findings. FETUS A BPD              7.8 cm        31 weeks 2 days* (41%) HC              29.0 cm        31 weeks 4 days* (29%) AC              28.5 cm        32 weeks 4 days* (81%) Femur            6.0 cm        31 weeks 2 days* (38%) HC/AC           1.02 FL/AC             21 FL/BPD                High-risk pregnancy     PTSD (post-traumatic stress disorder)        Past Surgical History:   Procedure  Laterality Date    COLONOSCOPY      MOUTH SURGERY      WISDOM TOOTH EXTRACTION         Family History   Problem Relation Age of Onset    Diabetes Mother     Heart attack Mother     Heart disease Mother     Hypertension Mother     Coronary artery disease Mother     Heart failure Mother     Hypothyroidism Mother     Mental illness Father     Diabetes Maternal Uncle     Heart disease Maternal Uncle     Coronary artery disease Maternal Uncle     Diabetes Maternal Grandmother     Hypertension Maternal Grandmother     Cancer Paternal Grandmother          Medications have been verified.        Objective   /62   Pulse 86   Temp 98.6 °F (37 °C)   Resp 16   LMP 02/24/2025 (Approximate)   SpO2 98%   Patient's last menstrual period was 02/24/2025 (approximate).       Physical Exam     Physical Exam  Vitals and nursing note reviewed.   Constitutional:       Appearance: Normal appearance.   HENT:      Head: Normocephalic and atraumatic.   Cardiovascular:      Rate and Rhythm: Normal rate.   Pulmonary:      Effort: Pulmonary effort is normal.   Musculoskeletal:      Comments: Left foot with ecchymosis of the fifth toe medial aspect.  Foot without swelling, rashes or wounds.  Tenderness over the fifth toe.  Brisk capillary refill.   Skin:     General: Skin is warm.   Neurological:      Mental Status: She is alert.       Xray independently reviewed by me contemporaneously as lucency proximal 5th phalanx . Awaiting radiology interpretation.

## 2025-03-10 NOTE — PROGRESS NOTES
Assessment/Plan:     Diagnoses and all orders for this visit:    Scalp pain    ?      Subjective:      Patient ID: Rafaela Peres is a 34 y.o. female.    Ms Russo is being seen because of head pain. Patient has been sick with various viral illnesses over the past month or so, and states that over the last few weeks, she has increased sensitivity and pain across her scalp. Began in the posterior, but has spread, mostly R sided. Denies vision changes, N/V, light or sound sensitivity. On inspection, no lesions or skin changes to the scalp. Pain not effected by tylenol. Likely neuropathic, but etiology unclear. Advised to monitor for now.        The following portions of the patient's history were reviewed and updated as appropriate: allergies, current medications, past family history, past medical history, past social history, past surgical history, and problem list.    Review of Systems   Constitutional:  Positive for activity change and fatigue. Negative for chills and fever.   HENT:  Positive for congestion and postnasal drip. Negative for ear pain, rhinorrhea and sore throat.    Eyes:  Negative for pain and visual disturbance.   Respiratory:  Negative for cough and shortness of breath.    Cardiovascular:  Negative for chest pain and palpitations.   Gastrointestinal:  Positive for nausea. Negative for abdominal pain, constipation, diarrhea and vomiting.   Genitourinary:  Negative for dysuria and hematuria.   Musculoskeletal:  Negative for arthralgias and back pain.   Skin:  Negative for color change and rash.   Neurological:  Negative for seizures, syncope and headaches.   Psychiatric/Behavioral:  Positive for sleep disturbance. Negative for agitation and confusion. The patient is nervous/anxious.    All other systems reviewed and are negative.        Objective:      /70 (BP Location: Left arm, Patient Position: Sitting, Cuff Size: Large)   Pulse 85   Temp 98.7 °F (37.1 °C) (Temporal)   Resp 18   " Ht 5' 2\" (1.575 m)   Wt 89.8 kg (198 lb)   SpO2 97%   BMI 36.21 kg/m²          Physical Exam  Vitals and nursing note reviewed.   Constitutional:       General: She is not in acute distress.     Appearance: Normal appearance. She is not ill-appearing.   HENT:      Head: Normocephalic and atraumatic.      Right Ear: External ear normal.      Left Ear: External ear normal.      Nose: Nose normal.      Mouth/Throat:      Mouth: Mucous membranes are moist.      Pharynx: Oropharynx is clear.   Eyes:      Extraocular Movements: Extraocular movements intact.      Conjunctiva/sclera: Conjunctivae normal.   Cardiovascular:      Rate and Rhythm: Normal rate and regular rhythm.      Pulses: Normal pulses.      Heart sounds: Normal heart sounds.   Pulmonary:      Effort: Pulmonary effort is normal.      Breath sounds: Normal breath sounds. No wheezing.   Abdominal:      General: Bowel sounds are normal.      Palpations: Abdomen is soft.      Tenderness: There is no abdominal tenderness. There is no guarding.   Musculoskeletal:         General: No swelling or deformity. Normal range of motion.      Cervical back: Normal range of motion and neck supple. No rigidity.   Skin:     General: Skin is warm and dry.      Capillary Refill: Capillary refill takes less than 2 seconds.      Findings: No bruising, erythema, lesion or rash.   Neurological:      General: No focal deficit present.      Mental Status: She is alert and oriented to person, place, and time.   Psychiatric:         Mood and Affect: Mood normal.         Behavior: Behavior normal.               "

## 2025-03-12 ENCOUNTER — OFFICE VISIT (OUTPATIENT)
Dept: FAMILY MEDICINE CLINIC | Facility: CLINIC | Age: 35
End: 2025-03-12
Payer: COMMERCIAL

## 2025-03-12 VITALS
SYSTOLIC BLOOD PRESSURE: 120 MMHG | OXYGEN SATURATION: 99 % | BODY MASS INDEX: 37.17 KG/M2 | HEIGHT: 62 IN | HEART RATE: 99 BPM | WEIGHT: 202 LBS | RESPIRATION RATE: 18 BRPM | DIASTOLIC BLOOD PRESSURE: 78 MMHG | TEMPERATURE: 97.3 F

## 2025-03-12 DIAGNOSIS — S92.502D CLOSED FRACTURE OF PHALANX OF LEFT FIFTH TOE WITH ROUTINE HEALING, SUBSEQUENT ENCOUNTER: Primary | ICD-10-CM

## 2025-03-12 PROCEDURE — 99213 OFFICE O/P EST LOW 20 MIN: CPT

## 2025-04-01 DIAGNOSIS — F41.9 ANXIETY: ICD-10-CM

## 2025-04-01 DIAGNOSIS — T63.441A BEE STING REACTION: ICD-10-CM

## 2025-04-01 RX ORDER — EPINEPHRINE 0.3 MG/.3ML
0.3 INJECTION SUBCUTANEOUS ONCE
Qty: 0.6 ML | Refills: 1 | Status: SHIPPED | OUTPATIENT
Start: 2025-04-01 | End: 2025-04-01

## 2025-04-01 RX ORDER — HYDROXYZINE HYDROCHLORIDE 25 MG/1
25 TABLET, FILM COATED ORAL EVERY 6 HOURS PRN
Qty: 30 TABLET | Refills: 2 | Status: SHIPPED | OUTPATIENT
Start: 2025-04-01

## 2025-04-06 NOTE — PROGRESS NOTES
Assessment/Plan:   Diagnoses and all orders for this visit:    Closed fracture of phalanx of left fifth toe with routine healing, subsequent encounter    ?      Subjective:      Patient ID: Rafaela Peres is a 34 y.o. female.    Ms Peres is being seen in office follow up on recent toe fracture. Patient stubbed L small toe on a crib in her home on Friday 3/7, presented to urgent care, XR revealing Acute nondisplaced fifth toe proximal phalanx fracture with possible extension to the proximal articular surface. Since that time, patient has been taking tylenol 1-2 times daily for pain control, and has been wearing compressive shoes. Able to bear weight. ROM, pulses, sensation intact. Bruises now barely visible, still w tenderness to palpation over injured area. Otherwise in her usual state of health, no f/u imaging required.         The following portions of the patient's history were reviewed and updated as appropriate: allergies, current medications, past family history, past medical history, past social history, past surgical history, and problem list.    Review of Systems   Constitutional:  Negative for activity change, chills, fatigue and fever.   HENT:  Negative for congestion, ear pain and sore throat.    Eyes:  Negative for pain and visual disturbance.   Respiratory:  Negative for cough and shortness of breath.    Cardiovascular:  Negative for chest pain and palpitations.   Gastrointestinal:  Negative for abdominal pain, constipation, diarrhea, nausea and vomiting.   Genitourinary:  Negative for dysuria and hematuria.   Musculoskeletal:  Negative for arthralgias and back pain.   Skin:  Negative for color change and rash.   Neurological:  Negative for seizures, syncope and headaches.   Psychiatric/Behavioral:  Negative for agitation, confusion and sleep disturbance.    All other systems reviewed and are negative.        Objective:      /78 (BP Location: Right arm, Patient Position: Sitting, Cuff Size:  "Large)   Pulse 99   Temp (!) 97.3 °F (36.3 °C) (Temporal)   Resp 18   Ht 5' 2\" (1.575 m)   Wt 91.6 kg (202 lb)   LMP 02/24/2025 (Approximate)   SpO2 99%   BMI 36.95 kg/m²          Physical Exam  Vitals and nursing note reviewed.   Constitutional:       General: She is not in acute distress.     Appearance: Normal appearance. She is obese. She is not ill-appearing.   HENT:      Head: Normocephalic and atraumatic.      Right Ear: External ear normal.      Left Ear: External ear normal.      Nose: Nose normal.      Mouth/Throat:      Mouth: Mucous membranes are moist.      Pharynx: Oropharynx is clear.   Eyes:      Extraocular Movements: Extraocular movements intact.      Conjunctiva/sclera: Conjunctivae normal.   Cardiovascular:      Rate and Rhythm: Normal rate and regular rhythm.      Pulses: Normal pulses.      Heart sounds: Normal heart sounds. No murmur heard.  Pulmonary:      Effort: Pulmonary effort is normal.      Breath sounds: Normal breath sounds. No wheezing.   Abdominal:      General: Bowel sounds are normal.      Palpations: Abdomen is soft.      Tenderness: There is no abdominal tenderness. There is no guarding.   Musculoskeletal:         General: Tenderness present. No deformity. Normal range of motion.      Cervical back: Normal range of motion. No rigidity.      Comments: Mild tenderness to palpation of fifth toe from phalanx to metatarsal   Skin:     General: Skin is warm and dry.      Findings: Bruising present. No erythema or rash.      Comments: Barely visible bruising along dorsal surface of foot over last two metatarsals   Neurological:      General: No focal deficit present.      Mental Status: She is alert and oriented to person, place, and time.   Psychiatric:         Mood and Affect: Mood normal.         Behavior: Behavior normal.               "

## 2025-04-22 ENCOUNTER — OFFICE VISIT (OUTPATIENT)
Dept: URGENT CARE | Facility: CLINIC | Age: 35
End: 2025-04-22
Payer: COMMERCIAL

## 2025-04-22 VITALS
BODY MASS INDEX: 36.8 KG/M2 | HEART RATE: 86 BPM | HEIGHT: 62 IN | WEIGHT: 200 LBS | SYSTOLIC BLOOD PRESSURE: 133 MMHG | TEMPERATURE: 98.7 F | OXYGEN SATURATION: 98 % | RESPIRATION RATE: 18 BRPM | DIASTOLIC BLOOD PRESSURE: 75 MMHG

## 2025-04-22 DIAGNOSIS — J01.10 ACUTE NON-RECURRENT FRONTAL SINUSITIS: Primary | ICD-10-CM

## 2025-04-22 DIAGNOSIS — J30.2 SEASONAL ALLERGIES: ICD-10-CM

## 2025-04-22 PROCEDURE — G0463 HOSPITAL OUTPT CLINIC VISIT: HCPCS

## 2025-04-22 PROCEDURE — 99213 OFFICE O/P EST LOW 20 MIN: CPT

## 2025-04-22 NOTE — PROGRESS NOTES
[facility]        NAME: Rafaela Peres is a 34 y.o. female  : 1990    MRN: [id number]  DATE: 2025  TIME: 2:08 PM    Assessment and Plan   Acute non-recurrent frontal sinusitis [J01.10]  1. Acute non-recurrent frontal sinusitis  amoxicillin-clavulanate (AUGMENTIN) 875-125 mg per tablet      2. Seasonal allergies        Given double sickening will treat with augmentin for sinusitis, likely initiated by allergies. Advise she restart there zyrtec. Per chart there are refills at pharmacy.      Patient Instructions   You have been prescribed an antibiotic.  Take antibiotic as directed for the full duration.  Do not stop the antibiotics just because you are feeling better.  Side effects of antibiotics include diarrhea.  Eat yogurt or take a probiotic or acidophilus tablet while taking this medication to help prevent diarrhea and replenish good gut bacteria.    Follow up with PCP in 3-5 days.  Proceed to  ER if symptoms worsen.    Chief Complaint     Chief Complaint   Patient presents with    Nasal Congestion     Symptoms started 2 weeks ago    Headache    Earache     Right ear pressure          History of Present Illness       Patient is a 34 year old female who presents to the office today for right sided sinus pain and pressure worse for past 2 days. Has had congestion for over 2 weeks. Has been taking dayquil and tylenol with some relief. Notes that her kids are congested recently.     Headache  Earache   Associated symptoms include headaches.       Review of Systems   Review of Systems   HENT:  Positive for ear pain, sinus pressure and sinus pain.    Neurological:  Positive for headaches.   All other systems reviewed and are negative.        Current Medications       Current Outpatient Medications:     amoxicillin-clavulanate (AUGMENTIN) 875-125 mg per tablet, Take 1 tablet by mouth every 12 (twelve) hours for 5 days, Disp: 10 tablet, Rfl: 0    cetirizine (ZyrTEC) 10 mg tablet, Take 1  tablet (10 mg total) by mouth daily, Disp: 30 tablet, Rfl: 2    hydrOXYzine HCL (ATARAX) 25 mg tablet, Take 1 tablet (25 mg total) by mouth every 6 (six) hours as needed for anxiety (Feelings of anxiety and being overwhelmed.), Disp: 30 tablet, Rfl: 2    Current Allergies     Allergies as of 04/22/2025 - Reviewed 04/22/2025   Allergen Reaction Noted    Bee venom  07/27/2019    Bupropion Other (See Comments) 08/19/2018            The following portions of the patient's history were reviewed and updated as appropriate: allergies, current medications, past family history, past medical history, past social history, past surgical history and problem list.     Past Medical History:   Diagnosis Date    Abscess 12/18/2019    Asthma     Bipolar 1 disorder (HCC)     BV (bacterial vaginosis) 10/12/2023    Candidiasis 10/12/2023    Closed right radial fracture     Dichorionic diamniotic twin pregnancy 06/01/2023    Last Assessment & Plan: Formatting of this note might be different from the original. US reviewed, normal findings. FETUS A BPD              7.8 cm        31 weeks 2 days* (41%) HC              29.0 cm        31 weeks 4 days* (29%) AC              28.5 cm        32 weeks 4 days* (81%) Femur            6.0 cm        31 weeks 2 days* (38%) HC/AC           1.02 FL/AC             21 FL/BPD                High-risk pregnancy     PTSD (post-traumatic stress disorder)        Past Surgical History:   Procedure Laterality Date    COLONOSCOPY      MOUTH SURGERY      WISDOM TOOTH EXTRACTION         Family History   Problem Relation Age of Onset    Diabetes Mother     Heart attack Mother     Heart disease Mother     Hypertension Mother     Coronary artery disease Mother     Heart failure Mother     Hypothyroidism Mother     Mental illness Father     Diabetes Maternal Uncle     Heart disease Maternal Uncle     Coronary artery disease Maternal Uncle     Diabetes Maternal Grandmother     Hypertension Maternal Grandmother     Cancer  "Paternal Grandmother          Medications have been verified.        Objective   /75   Pulse 86   Temp 98.7 °F (37.1 °C)   Resp 18   Ht 5' 2.21\" (1.58 m)   Wt 90.7 kg (200 lb)   LMP 04/20/2025 (Exact Date)   SpO2 98%   BMI 36.34 kg/m²        Physical Exam     Physical Exam  Vitals and nursing note reviewed.   Constitutional:       Appearance: Normal appearance.   HENT:      Right Ear: Tympanic membrane normal.      Left Ear: Tympanic membrane normal.      Nose: Congestion present.      Right Sinus: Maxillary sinus tenderness and frontal sinus tenderness present.      Mouth/Throat:      Mouth: Mucous membranes are moist.   Cardiovascular:      Rate and Rhythm: Normal rate and regular rhythm.      Pulses: Normal pulses.      Heart sounds: Normal heart sounds.   Pulmonary:      Effort: Pulmonary effort is normal.      Breath sounds: Normal breath sounds.   Skin:     General: Skin is warm.      Capillary Refill: Capillary refill takes less than 2 seconds.   Neurological:      Mental Status: She is alert.                   "

## 2025-05-05 ENCOUNTER — OFFICE VISIT (OUTPATIENT)
Dept: SURGERY | Facility: CLINIC | Age: 35
End: 2025-05-05
Payer: COMMERCIAL

## 2025-05-05 VITALS
WEIGHT: 201 LBS | BODY MASS INDEX: 36.99 KG/M2 | DIASTOLIC BLOOD PRESSURE: 84 MMHG | TEMPERATURE: 98.4 F | HEIGHT: 62 IN | HEART RATE: 90 BPM | SYSTOLIC BLOOD PRESSURE: 122 MMHG | OXYGEN SATURATION: 99 %

## 2025-05-05 DIAGNOSIS — E66.01 OBESITY, MORBID (HCC): ICD-10-CM

## 2025-05-05 DIAGNOSIS — L73.2 HIDRADENITIS SUPPURATIVA: Primary | ICD-10-CM

## 2025-05-05 DIAGNOSIS — F17.200 NICOTINE DEPENDENCE: ICD-10-CM

## 2025-05-05 PROCEDURE — 99406 BEHAV CHNG SMOKING 3-10 MIN: CPT | Performed by: SURGERY

## 2025-05-05 PROCEDURE — 99214 OFFICE O/P EST MOD 30 MIN: CPT | Performed by: SURGERY

## 2025-05-05 RX ORDER — SODIUM CHLORIDE, SODIUM LACTATE, POTASSIUM CHLORIDE, CALCIUM CHLORIDE 600; 310; 30; 20 MG/100ML; MG/100ML; MG/100ML; MG/100ML
20 INJECTION, SOLUTION INTRAVENOUS CONTINUOUS
Status: CANCELLED | OUTPATIENT
Start: 2025-05-13

## 2025-05-05 RX ORDER — CEFAZOLIN SODIUM 2 G/50ML
2000 SOLUTION INTRAVENOUS ONCE
Status: CANCELLED | OUTPATIENT
Start: 2025-05-13 | End: 2025-05-05

## 2025-05-05 RX ORDER — ACETAMINOPHEN 325 MG/1
975 TABLET ORAL ONCE
Status: CANCELLED | OUTPATIENT
Start: 2025-05-13 | End: 2025-05-05

## 2025-05-05 RX ORDER — HEPARIN SODIUM 5000 [USP'U]/ML
5000 INJECTION, SOLUTION INTRAVENOUS; SUBCUTANEOUS ONCE
Status: CANCELLED | OUTPATIENT
Start: 2025-05-13 | End: 2025-05-05

## 2025-05-05 RX ORDER — DOXYCYCLINE 100 MG/1
100 TABLET ORAL 2 TIMES DAILY
Qty: 42 TABLET | Refills: 0 | Status: SHIPPED | OUTPATIENT
Start: 2025-05-05 | End: 2025-05-26

## 2025-05-05 NOTE — H&P (VIEW-ONLY)
Name: Rafaela Peres      : 1990      MRN: [id number]  Encounter Provider: Enrique Diaz DO  Encounter Date: 2025   Encounter department: [facility]  :  Assessment & Plan  Hidradenitis suppurativa  Patient's status post multiple previous incisions and drainage of bilateral axillary hidradenitis suppurativa.  Patient evaluated at dermatology clinic and unsuccessfully treated with antibiotic therapy.  Quality of life has been affected due to daily pain and discharge from bilateral armpits affecting her ability to work and comfort at home.  Interested in surgical removal of disease. Patient counseled on risk of disease recurrence and post-operative complications however she still stated she was interested in pursuing operative management.  Recent 2024 stress test reviewed which was negative for ischemia.  Most recent lab work reviewed.    Orders:    Case request operating room: EXCISION HIDRADENITIS AXILLARY; Standing    doxycycline (ADOXA) 100 MG tablet; Take 1 tablet (100 mg total) by mouth 2 (two) times a day for 21 days    Plan:  -electronic consent obtained in office for bilateral excision of axillary hidradenitis. Patient to schedule desired outpatient surgery  -prescribed doxycycline to take until surgery  -continue warm compresses & absorptive dressing changes as needed  -OTC pain medications as needed  Obesity, morbid (HCC)       Nicotine dependence  Tobacco use is a significant patient-modifiable risk factor for this patient’s vascular disease with multiple vascular comorbidities, and a significant risk factor for failure of and complications from any endovascular or surgical interventions.    I explained to the patient the effects of smoking including peripheral artery disease, coronary artery disease, cerebrovascular disease as well as cancer and chronic obstructive pulmonary disease. I asked the patient to stop smoking immediately. It is never too late to  quit, and many studies show significant health benefits as well as economical savings after smoking cessation. I offered to the patient nicotine replacement therapy as well as referral to the smoking cessation program and access to the quit line 0-118-PJSFJXP or ambulatory referral to our network smoking cessation program.    Based on our conversation, this patient appears motivated to quit and plans to quit without nicotine replacement or medications    The patient set a quit date of today . I will continue to follow up on this issue at our next scheduled visit.     I spent approximately 3-5 minutes on tobacco cessation counseling with this patient.       Primary Care Physician: Luis Eduardo Euceda, DO    This plan was discussed with the attending provider, Dr. Diaz. Should any questions arise regarding this visit please reach back out to our office staff.    Monster Alvarado MD  General Surgery  05/05/25  2:52 PM      History of Present Illness  HPI  Rafaela Peres is a 34 y.o. established female who presents for follow up for her bilateral axilla hidradenitis suppurativa.  Patient had multiple incisions of axillary disease with temporary control of pressure and discomfort and continued reported drainage.  She has been managing drainage with absorptive dressings however pain is her primary symptom concern.  She was referred to dermatology clinic who was reported per patient did not recommend additional medical therapy as I have previously unsuccessful antibiotics.  Reports that her current disease is causing her discomfort at work, not keeping her up awake at night, and needing to take OTC pain meds to help her sleep through the night.  She reports well-managed areas of disease on her hips/groin, breasts, and pelvis without similar pain to her axillary disease.    Additionally mentions that she had been evaluated by cardiologist towards the end of last year for an atypical heartbeat.  Reviewed clinic note by  Dr. Ayala on 9/23/2024 for which a cardiac stress test was ordered and completed on 10/8/2024.  Her stress ECG was negative for ischemia. No ongoing chest pain or shortness of breath reported today in clinic.  ROS as below.  History obtained from: patient    Review of Systems   Constitutional:  Positive for activity change (avoidance of activities which causes pain). Negative for appetite change, chills, fatigue and fever.   HENT:  Negative for ear pain and sore throat.    Eyes: Negative.    Respiratory:  Negative for cough and shortness of breath.    Cardiovascular:  Negative for chest pain and palpitations.   Gastrointestinal:  Negative for abdominal distention, abdominal pain, constipation, diarrhea, nausea and vomiting.   Endocrine: Negative.    Genitourinary:  Negative for difficulty urinating, dysuria, frequency and hematuria.   Musculoskeletal:  Negative for arthralgias and back pain.   Skin:  Positive for wound (drainage, pain, & redness). Negative for color change and rash.   Allergic/Immunologic: Negative.    Neurological:  Negative for dizziness, seizures, syncope, weakness and headaches.   Psychiatric/Behavioral:  Positive for agitation (2/2 pain) and sleep disturbance. Negative for confusion and suicidal ideas. The patient is not hyperactive.    All other systems reviewed and are negative.    Medical History Reviewed by provider this encounter:     .  Past Medical History  Past Medical History:   Diagnosis Date    Abscess 12/18/2019    Asthma     Bipolar 1 disorder (HCC)     BV (bacterial vaginosis) 10/12/2023    Candidiasis 10/12/2023    Closed right radial fracture     Dichorionic diamniotic twin pregnancy 06/01/2023    Last Assessment & Plan: Formatting of this note might be different from the original. US reviewed, normal findings. FETUS A BPD              7.8 cm        31 weeks 2 days* (41%) HC              29.0 cm        31 weeks 4 days* (29%) AC              28.5 cm        32 weeks 4 days*  "(81%) Femur            6.0 cm        31 weeks 2 days* (38%) HC/AC           1.02 FL/AC             21 FL/BPD                High-risk pregnancy     PTSD (post-traumatic stress disorder)      Past Surgical History:   Procedure Laterality Date    COLONOSCOPY      MOUTH SURGERY      WISDOM TOOTH EXTRACTION       Family History   Problem Relation Age of Onset    Diabetes Mother     Heart attack Mother     Heart disease Mother     Hypertension Mother     Coronary artery disease Mother     Heart failure Mother     Hypothyroidism Mother     Mental illness Father     Diabetes Maternal Uncle     Heart disease Maternal Uncle     Coronary artery disease Maternal Uncle     Diabetes Maternal Grandmother     Hypertension Maternal Grandmother     Cancer Paternal Grandmother       reports that she has been smoking cigarettes. She started smoking about 17 years ago. She has a 4.3 pack-year smoking history. She has been exposed to tobacco smoke. She has never used smokeless tobacco. She reports current alcohol use. She reports current drug use. Drug: Marijuana.  Current Outpatient Medications   Medication Instructions    cetirizine (ZYRTEC) 10 mg, Oral, Daily    doxycycline (ADOXA) 100 mg, Oral, 2 times daily    hydrOXYzine HCL (ATARAX) 25 mg, Oral, Every 6 hours PRN     Allergies   Allergen Reactions    Bee Venom     Bupropion Other (See Comments)     \"shaky\"      Current Outpatient Medications on File Prior to Visit   Medication Sig Dispense Refill    cetirizine (ZyrTEC) 10 mg tablet Take 1 tablet (10 mg total) by mouth daily 30 tablet 2    hydrOXYzine HCL (ATARAX) 25 mg tablet Take 1 tablet (25 mg total) by mouth every 6 (six) hours as needed for anxiety (Feelings of anxiety and being overwhelmed.) 30 tablet 2     No current facility-administered medications on file prior to visit.      Social History     Tobacco Use    Smoking status: Some Days     Current packs/day: 0.25     Average packs/day: 0.3 packs/day for 17.3 years (4.3 " "ttl pk-yrs)     Types: Cigarettes     Start date: 2008     Passive exposure: Current    Smokeless tobacco: Never   Vaping Use    Vaping status: Never Used   Substance and Sexual Activity    Alcohol use: Yes     Comment: social    Drug use: Yes     Types: Marijuana     Comment: Medical card    Sexual activity: Yes     Partners: Male     Birth control/protection: None        Objective  /84 (BP Location: Left arm, Patient Position: Sitting, Cuff Size: Standard)   Pulse 90   Temp 98.4 °F (36.9 °C) (Temporal)   Ht 5' 2.21\" (1.58 m)   Wt 91.2 kg (201 lb)   LMP 04/20/2025 (Exact Date)   SpO2 99%   BMI 36.52 kg/m²      Physical Exam  Vitals and nursing note reviewed. Exam conducted with a chaperone present.   Constitutional:       General: She is not in acute distress.     Appearance: Normal appearance. She is well-developed. She is obese.   HENT:      Head: Normocephalic and atraumatic.      Right Ear: External ear normal.      Left Ear: External ear normal.      Nose: Nose normal.   Eyes:      General: No scleral icterus.     Extraocular Movements: Extraocular movements intact.      Conjunctiva/sclera: Conjunctivae normal.   Cardiovascular:      Rate and Rhythm: Normal rate and regular rhythm.      Heart sounds: No murmur heard.  Pulmonary:      Effort: Pulmonary effort is normal. No respiratory distress.      Breath sounds: Normal breath sounds.   Abdominal:      Palpations: Abdomen is soft.      Tenderness: There is no abdominal tenderness.      Comments: Protuberant abdomen   Musculoskeletal:         General: No swelling.      Cervical back: Neck supple.   Skin:     General: Skin is warm and dry.      Capillary Refill: Capillary refill takes less than 2 seconds.      Findings: Erythema present. No bruising.      Comments: Bilateral axillary hidradenitis with mild erythema and no observable drainage.  Tenderness to palpation bilaterally (photos below).  No palpable surrounding areas of tracking or " subcutaneous abnormalities   Neurological:      General: No focal deficit present.      Mental Status: She is alert and oriented to person, place, and time. Mental status is at baseline.   Psychiatric:         Mood and Affect: Mood normal.         Behavior: Behavior normal.         Thought Content: Thought content normal.         Judgment: Judgment normal.       Right Axilla:      Left Axilla:

## 2025-05-05 NOTE — PROGRESS NOTES
Name: Rafaela Peres      : 1990      MRN: [id number]  Encounter Provider: Enrique Diaz DO  Encounter Date: 2025   Encounter department: [facility]  :  Assessment & Plan  Hidradenitis suppurativa  Patient's status post multiple previous incisions and drainage of bilateral axillary hidradenitis suppurativa.  Patient evaluated at dermatology clinic and unsuccessfully treated with antibiotic therapy.  Quality of life has been affected due to daily pain and discharge from bilateral armpits affecting her ability to work and comfort at home.  Interested in surgical removal of disease. Patient counseled on risk of disease recurrence and post-operative complications however she still stated she was interested in pursuing operative management.  Recent 2024 stress test reviewed which was negative for ischemia.  Most recent lab work reviewed.    Orders:    Case request operating room: EXCISION HIDRADENITIS AXILLARY; Standing    doxycycline (ADOXA) 100 MG tablet; Take 1 tablet (100 mg total) by mouth 2 (two) times a day for 21 days    Plan:  -electronic consent obtained in office for bilateral excision of axillary hidradenitis. Patient to schedule desired outpatient surgery  -prescribed doxycycline to take until surgery  -continue warm compresses & absorptive dressing changes as needed  -OTC pain medications as needed  Obesity, morbid (HCC)       Nicotine dependence  Tobacco use is a significant patient-modifiable risk factor for this patient’s vascular disease with multiple vascular comorbidities, and a significant risk factor for failure of and complications from any endovascular or surgical interventions.    I explained to the patient the effects of smoking including peripheral artery disease, coronary artery disease, cerebrovascular disease as well as cancer and chronic obstructive pulmonary disease. I asked the patient to stop smoking immediately. It is never too late to  quit, and many studies show significant health benefits as well as economical savings after smoking cessation. I offered to the patient nicotine replacement therapy as well as referral to the smoking cessation program and access to the quit line 9-866-MSMZASL or ambulatory referral to our network smoking cessation program.    Based on our conversation, this patient appears motivated to quit and plans to quit without nicotine replacement or medications    The patient set a quit date of today . I will continue to follow up on this issue at our next scheduled visit.     I spent approximately 3-5 minutes on tobacco cessation counseling with this patient.       Primary Care Physician: Luis Eduardo Euceda, DO    This plan was discussed with the attending provider, Dr. Diaz. Should any questions arise regarding this visit please reach back out to our office staff.    Monster Alvarado MD  General Surgery  05/05/25  2:52 PM      History of Present Illness   HPI  Rafaela Peres is a 34 y.o. established female who presents for follow up for her bilateral axilla hidradenitis suppurativa.  Patient had multiple incisions of axillary disease with temporary control of pressure and discomfort and continued reported drainage.  She has been managing drainage with absorptive dressings however pain is her primary symptom concern.  She was referred to dermatology clinic who was reported per patient did not recommend additional medical therapy as I have previously unsuccessful antibiotics.  Reports that her current disease is causing her discomfort at work, not keeping her up awake at night, and needing to take OTC pain meds to help her sleep through the night.  She reports well-managed areas of disease on her hips/groin, breasts, and pelvis without similar pain to her axillary disease.    Additionally mentions that she had been evaluated by cardiologist towards the end of last year for an atypical heartbeat.  Reviewed clinic note by  Dr. Ayala on 9/23/2024 for which a cardiac stress test was ordered and completed on 10/8/2024.  Her stress ECG was negative for ischemia. No ongoing chest pain or shortness of breath reported today in clinic.  ROS as below.  History obtained from: patient    Review of Systems   Constitutional:  Positive for activity change (avoidance of activities which causes pain). Negative for appetite change, chills, fatigue and fever.   HENT:  Negative for ear pain and sore throat.    Eyes: Negative.    Respiratory:  Negative for cough and shortness of breath.    Cardiovascular:  Negative for chest pain and palpitations.   Gastrointestinal:  Negative for abdominal distention, abdominal pain, constipation, diarrhea, nausea and vomiting.   Endocrine: Negative.    Genitourinary:  Negative for difficulty urinating, dysuria, frequency and hematuria.   Musculoskeletal:  Negative for arthralgias and back pain.   Skin:  Positive for wound (drainage, pain, & redness). Negative for color change and rash.   Allergic/Immunologic: Negative.    Neurological:  Negative for dizziness, seizures, syncope, weakness and headaches.   Psychiatric/Behavioral:  Positive for agitation (2/2 pain) and sleep disturbance. Negative for confusion and suicidal ideas. The patient is not hyperactive.    All other systems reviewed and are negative.    Medical History Reviewed by provider this encounter:     .  Past Medical History   Past Medical History:   Diagnosis Date    Abscess 12/18/2019    Asthma     Bipolar 1 disorder (HCC)     BV (bacterial vaginosis) 10/12/2023    Candidiasis 10/12/2023    Closed right radial fracture     Dichorionic diamniotic twin pregnancy 06/01/2023    Last Assessment & Plan: Formatting of this note might be different from the original. US reviewed, normal findings. FETUS A BPD              7.8 cm        31 weeks 2 days* (41%) HC              29.0 cm        31 weeks 4 days* (29%) AC              28.5 cm        32 weeks 4 days*  "(81%) Femur            6.0 cm        31 weeks 2 days* (38%) HC/AC           1.02 FL/AC             21 FL/BPD                High-risk pregnancy     PTSD (post-traumatic stress disorder)      Past Surgical History:   Procedure Laterality Date    COLONOSCOPY      MOUTH SURGERY      WISDOM TOOTH EXTRACTION       Family History   Problem Relation Age of Onset    Diabetes Mother     Heart attack Mother     Heart disease Mother     Hypertension Mother     Coronary artery disease Mother     Heart failure Mother     Hypothyroidism Mother     Mental illness Father     Diabetes Maternal Uncle     Heart disease Maternal Uncle     Coronary artery disease Maternal Uncle     Diabetes Maternal Grandmother     Hypertension Maternal Grandmother     Cancer Paternal Grandmother       reports that she has been smoking cigarettes. She started smoking about 17 years ago. She has a 4.3 pack-year smoking history. She has been exposed to tobacco smoke. She has never used smokeless tobacco. She reports current alcohol use. She reports current drug use. Drug: Marijuana.  Current Outpatient Medications   Medication Instructions    cetirizine (ZYRTEC) 10 mg, Oral, Daily    doxycycline (ADOXA) 100 mg, Oral, 2 times daily    hydrOXYzine HCL (ATARAX) 25 mg, Oral, Every 6 hours PRN     Allergies   Allergen Reactions    Bee Venom     Bupropion Other (See Comments)     \"shaky\"      Current Outpatient Medications on File Prior to Visit   Medication Sig Dispense Refill    cetirizine (ZyrTEC) 10 mg tablet Take 1 tablet (10 mg total) by mouth daily 30 tablet 2    hydrOXYzine HCL (ATARAX) 25 mg tablet Take 1 tablet (25 mg total) by mouth every 6 (six) hours as needed for anxiety (Feelings of anxiety and being overwhelmed.) 30 tablet 2     No current facility-administered medications on file prior to visit.      Social History     Tobacco Use    Smoking status: Some Days     Current packs/day: 0.25     Average packs/day: 0.3 packs/day for 17.3 years (4.3 " "ttl pk-yrs)     Types: Cigarettes     Start date: 2008     Passive exposure: Current    Smokeless tobacco: Never   Vaping Use    Vaping status: Never Used   Substance and Sexual Activity    Alcohol use: Yes     Comment: social    Drug use: Yes     Types: Marijuana     Comment: Medical card    Sexual activity: Yes     Partners: Male     Birth control/protection: None        Objective   /84 (BP Location: Left arm, Patient Position: Sitting, Cuff Size: Standard)   Pulse 90   Temp 98.4 °F (36.9 °C) (Temporal)   Ht 5' 2.21\" (1.58 m)   Wt 91.2 kg (201 lb)   LMP 04/20/2025 (Exact Date)   SpO2 99%   BMI 36.52 kg/m²      Physical Exam  Vitals and nursing note reviewed. Exam conducted with a chaperone present.   Constitutional:       General: She is not in acute distress.     Appearance: Normal appearance. She is well-developed. She is obese.   HENT:      Head: Normocephalic and atraumatic.      Right Ear: External ear normal.      Left Ear: External ear normal.      Nose: Nose normal.   Eyes:      General: No scleral icterus.     Extraocular Movements: Extraocular movements intact.      Conjunctiva/sclera: Conjunctivae normal.   Cardiovascular:      Rate and Rhythm: Normal rate and regular rhythm.      Heart sounds: No murmur heard.  Pulmonary:      Effort: Pulmonary effort is normal. No respiratory distress.      Breath sounds: Normal breath sounds.   Abdominal:      Palpations: Abdomen is soft.      Tenderness: There is no abdominal tenderness.      Comments: Protuberant abdomen   Musculoskeletal:         General: No swelling.      Cervical back: Neck supple.   Skin:     General: Skin is warm and dry.      Capillary Refill: Capillary refill takes less than 2 seconds.      Findings: Erythema present. No bruising.      Comments: Bilateral axillary hidradenitis with mild erythema and no observable drainage.  Tenderness to palpation bilaterally (photos below).  No palpable surrounding areas of tracking or " subcutaneous abnormalities   Neurological:      General: No focal deficit present.      Mental Status: She is alert and oriented to person, place, and time. Mental status is at baseline.   Psychiatric:         Mood and Affect: Mood normal.         Behavior: Behavior normal.         Thought Content: Thought content normal.         Judgment: Judgment normal.       Right Axilla:      Left Axilla:

## 2025-05-05 NOTE — ASSESSMENT & PLAN NOTE
Tobacco use is a significant patient-modifiable risk factor for this patient’s vascular disease with multiple vascular comorbidities, and a significant risk factor for failure of and complications from any endovascular or surgical interventions.    I explained to the patient the effects of smoking including peripheral artery disease, coronary artery disease, cerebrovascular disease as well as cancer and chronic obstructive pulmonary disease. I asked the patient to stop smoking immediately. It is never too late to quit, and many studies show significant health benefits as well as economical savings after smoking cessation. I offered to the patient nicotine replacement therapy as well as referral to the smoking cessation program and access to the quit line 4-900-CJHQPWZ or ambulatory referral to our network smoking cessation program.    Based on our conversation, this patient appears motivated to quit and plans to quit without nicotine replacement or medications    The patient set a quit date of today. I will continue to follow up on this issue at our next scheduled visit.     I spent approximately 3-5 minutes on tobacco cessation counseling with this patient.

## 2025-05-05 NOTE — H&P
Name: Rafaela Peres      : 1990      MRN: [id number]  Encounter Provider: Enrique Diaz DO  Encounter Date: 2025   Encounter department: [facility]  :  Assessment & Plan  Hidradenitis suppurativa  Patient's status post multiple previous incisions and drainage of bilateral axillary hidradenitis suppurativa.  Patient evaluated at dermatology clinic and unsuccessfully treated with antibiotic therapy.  Quality of life has been affected due to daily pain and discharge from bilateral armpits affecting her ability to work and comfort at home.  Interested in surgical removal of disease. Patient counseled on risk of disease recurrence and post-operative complications however she still stated she was interested in pursuing operative management.  Recent 2024 stress test reviewed which was negative for ischemia.  Most recent lab work reviewed.    Orders:    Case request operating room: EXCISION HIDRADENITIS AXILLARY; Standing    doxycycline (ADOXA) 100 MG tablet; Take 1 tablet (100 mg total) by mouth 2 (two) times a day for 21 days    Plan:  -electronic consent obtained in office for bilateral excision of axillary hidradenitis. Patient to schedule desired outpatient surgery  -prescribed doxycycline to take until surgery  -continue warm compresses & absorptive dressing changes as needed  -OTC pain medications as needed  Obesity, morbid (HCC)       Nicotine dependence  Tobacco use is a significant patient-modifiable risk factor for this patient’s vascular disease with multiple vascular comorbidities, and a significant risk factor for failure of and complications from any endovascular or surgical interventions.    I explained to the patient the effects of smoking including peripheral artery disease, coronary artery disease, cerebrovascular disease as well as cancer and chronic obstructive pulmonary disease. I asked the patient to stop smoking immediately. It is never too late to  quit, and many studies show significant health benefits as well as economical savings after smoking cessation. I offered to the patient nicotine replacement therapy as well as referral to the smoking cessation program and access to the quit line 5-586-YTDCTVO or ambulatory referral to our network smoking cessation program.    Based on our conversation, this patient appears motivated to quit and plans to quit without nicotine replacement or medications    The patient set a quit date of today . I will continue to follow up on this issue at our next scheduled visit.     I spent approximately 3-5 minutes on tobacco cessation counseling with this patient.       Primary Care Physician: Luis Eduardo Euceda, DO    This plan was discussed with the attending provider, Dr. Diaz. Should any questions arise regarding this visit please reach back out to our office staff.    Monster Alvarado MD  General Surgery  05/05/25  2:52 PM      History of Present Illness  HPI  Rafaela Peres is a 34 y.o. established female who presents for follow up for her bilateral axilla hidradenitis suppurativa.  Patient had multiple incisions of axillary disease with temporary control of pressure and discomfort and continued reported drainage.  She has been managing drainage with absorptive dressings however pain is her primary symptom concern.  She was referred to dermatology clinic who was reported per patient did not recommend additional medical therapy as I have previously unsuccessful antibiotics.  Reports that her current disease is causing her discomfort at work, not keeping her up awake at night, and needing to take OTC pain meds to help her sleep through the night.  She reports well-managed areas of disease on her hips/groin, breasts, and pelvis without similar pain to her axillary disease.    Additionally mentions that she had been evaluated by cardiologist towards the end of last year for an atypical heartbeat.  Reviewed clinic note by  Dr. Ayala on 9/23/2024 for which a cardiac stress test was ordered and completed on 10/8/2024.  Her stress ECG was negative for ischemia. No ongoing chest pain or shortness of breath reported today in clinic.  ROS as below.  History obtained from: patient    Review of Systems   Constitutional:  Positive for activity change (avoidance of activities which causes pain). Negative for appetite change, chills, fatigue and fever.   HENT:  Negative for ear pain and sore throat.    Eyes: Negative.    Respiratory:  Negative for cough and shortness of breath.    Cardiovascular:  Negative for chest pain and palpitations.   Gastrointestinal:  Negative for abdominal distention, abdominal pain, constipation, diarrhea, nausea and vomiting.   Endocrine: Negative.    Genitourinary:  Negative for difficulty urinating, dysuria, frequency and hematuria.   Musculoskeletal:  Negative for arthralgias and back pain.   Skin:  Positive for wound (drainage, pain, & redness). Negative for color change and rash.   Allergic/Immunologic: Negative.    Neurological:  Negative for dizziness, seizures, syncope, weakness and headaches.   Psychiatric/Behavioral:  Positive for agitation (2/2 pain) and sleep disturbance. Negative for confusion and suicidal ideas. The patient is not hyperactive.    All other systems reviewed and are negative.    Medical History Reviewed by provider this encounter:     .  Past Medical History  Past Medical History:   Diagnosis Date    Abscess 12/18/2019    Asthma     Bipolar 1 disorder (HCC)     BV (bacterial vaginosis) 10/12/2023    Candidiasis 10/12/2023    Closed right radial fracture     Dichorionic diamniotic twin pregnancy 06/01/2023    Last Assessment & Plan: Formatting of this note might be different from the original. US reviewed, normal findings. FETUS A BPD              7.8 cm        31 weeks 2 days* (41%) HC              29.0 cm        31 weeks 4 days* (29%) AC              28.5 cm        32 weeks 4 days*  "(81%) Femur            6.0 cm        31 weeks 2 days* (38%) HC/AC           1.02 FL/AC             21 FL/BPD                High-risk pregnancy     PTSD (post-traumatic stress disorder)      Past Surgical History:   Procedure Laterality Date    COLONOSCOPY      MOUTH SURGERY      WISDOM TOOTH EXTRACTION       Family History   Problem Relation Age of Onset    Diabetes Mother     Heart attack Mother     Heart disease Mother     Hypertension Mother     Coronary artery disease Mother     Heart failure Mother     Hypothyroidism Mother     Mental illness Father     Diabetes Maternal Uncle     Heart disease Maternal Uncle     Coronary artery disease Maternal Uncle     Diabetes Maternal Grandmother     Hypertension Maternal Grandmother     Cancer Paternal Grandmother       reports that she has been smoking cigarettes. She started smoking about 17 years ago. She has a 4.3 pack-year smoking history. She has been exposed to tobacco smoke. She has never used smokeless tobacco. She reports current alcohol use. She reports current drug use. Drug: Marijuana.  Current Outpatient Medications   Medication Instructions    cetirizine (ZYRTEC) 10 mg, Oral, Daily    doxycycline (ADOXA) 100 mg, Oral, 2 times daily    hydrOXYzine HCL (ATARAX) 25 mg, Oral, Every 6 hours PRN     Allergies   Allergen Reactions    Bee Venom     Bupropion Other (See Comments)     \"shaky\"      Current Outpatient Medications on File Prior to Visit   Medication Sig Dispense Refill    cetirizine (ZyrTEC) 10 mg tablet Take 1 tablet (10 mg total) by mouth daily 30 tablet 2    hydrOXYzine HCL (ATARAX) 25 mg tablet Take 1 tablet (25 mg total) by mouth every 6 (six) hours as needed for anxiety (Feelings of anxiety and being overwhelmed.) 30 tablet 2     No current facility-administered medications on file prior to visit.      Social History     Tobacco Use    Smoking status: Some Days     Current packs/day: 0.25     Average packs/day: 0.3 packs/day for 17.3 years (4.3 " "ttl pk-yrs)     Types: Cigarettes     Start date: 2008     Passive exposure: Current    Smokeless tobacco: Never   Vaping Use    Vaping status: Never Used   Substance and Sexual Activity    Alcohol use: Yes     Comment: social    Drug use: Yes     Types: Marijuana     Comment: Medical card    Sexual activity: Yes     Partners: Male     Birth control/protection: None        Objective  /84 (BP Location: Left arm, Patient Position: Sitting, Cuff Size: Standard)   Pulse 90   Temp 98.4 °F (36.9 °C) (Temporal)   Ht 5' 2.21\" (1.58 m)   Wt 91.2 kg (201 lb)   LMP 04/20/2025 (Exact Date)   SpO2 99%   BMI 36.52 kg/m²      Physical Exam  Vitals and nursing note reviewed. Exam conducted with a chaperone present.   Constitutional:       General: She is not in acute distress.     Appearance: Normal appearance. She is well-developed. She is obese.   HENT:      Head: Normocephalic and atraumatic.      Right Ear: External ear normal.      Left Ear: External ear normal.      Nose: Nose normal.   Eyes:      General: No scleral icterus.     Extraocular Movements: Extraocular movements intact.      Conjunctiva/sclera: Conjunctivae normal.   Cardiovascular:      Rate and Rhythm: Normal rate and regular rhythm.      Heart sounds: No murmur heard.  Pulmonary:      Effort: Pulmonary effort is normal. No respiratory distress.      Breath sounds: Normal breath sounds.   Abdominal:      Palpations: Abdomen is soft.      Tenderness: There is no abdominal tenderness.      Comments: Protuberant abdomen   Musculoskeletal:         General: No swelling.      Cervical back: Neck supple.   Skin:     General: Skin is warm and dry.      Capillary Refill: Capillary refill takes less than 2 seconds.      Findings: Erythema present. No bruising.      Comments: Bilateral axillary hidradenitis with mild erythema and no observable drainage.  Tenderness to palpation bilaterally (photos below).  No palpable surrounding areas of tracking or " subcutaneous abnormalities   Neurological:      General: No focal deficit present.      Mental Status: She is alert and oriented to person, place, and time. Mental status is at baseline.   Psychiatric:         Mood and Affect: Mood normal.         Behavior: Behavior normal.         Thought Content: Thought content normal.         Judgment: Judgment normal.       Right Axilla:      Left Axilla:

## 2025-05-05 NOTE — ASSESSMENT & PLAN NOTE
Patient's status post multiple previous incisions and drainage of bilateral axillary hidradenitis suppurativa.  Patient evaluated at dermatology clinic and unsuccessfully treated with antibiotic therapy.  Quality of life has been affected due to daily pain and discharge from bilateral armpits affecting her ability to work and comfort at home.  Interested in surgical removal of disease. Patient counseled on risk of disease recurrence and post-operative complications however she still stated she was interested in pursuing operative management.  Recent October 2024 stress test reviewed which was negative for ischemia.  Most recent lab work reviewed.    Orders:    Case request operating room: EXCISION HIDRADENITIS AXILLARY; Standing    doxycycline (ADOXA) 100 MG tablet; Take 1 tablet (100 mg total) by mouth 2 (two) times a day for 21 days    Plan:  -electronic consent obtained in office for bilateral excision of axillary hidradenitis. Patient to schedule desired outpatient surgery  -prescribed doxycycline to take until surgery  -continue warm compresses & absorptive dressing changes as needed  -OTC pain medications as needed

## 2025-05-08 ENCOUNTER — ANESTHESIA EVENT (OUTPATIENT)
Dept: PERIOP | Facility: HOSPITAL | Age: 35
End: 2025-05-08
Payer: COMMERCIAL

## 2025-05-08 NOTE — PRE-PROCEDURE INSTRUCTIONS
Pre-Surgery Instructions:   Medication Instructions    cetirizine (ZyrTEC) 10 mg tablet Hold day of surgery      doxycycline (ADOXA) 100 MG tablet Hold day of surgery.    hydrOXYzine HCL (ATARAX) 25 mg tablet Uses PRN- OK to take day of surgery    Medication instructions for day of surgery reviewed. Please take all instructed medications with only a sip of water.       You will receive a call one business day prior to surgery with an arrival time and hospital directions. If your surgery is scheduled on a Monday, the hospital will be calling you on the Friday prior to your surgery. If you have not heard from anyone by 8pm, please call the hospital supervisor through the hospital  at [phone]. ([place] [phone] or [place] [phone]).    Do not eat or drink anything after midnight the night before your surgery, including candy, mints, lifesavers, or chewing gum. Do not drink alcohol 24hrs before your surgery. Try not to smoke at least 24hrs before your surgery.       Follow the pre surgery showering instructions as listed in the “My Surgical Experience Booklet” or otherwise provided by your surgeon's office. Do not use a blade to shave the surgical area 1 week before surgery. It is okay to use a clean electric clippers up to 24 hours before surgery. Do not apply any lotions, creams, including makeup, cologne, deodorant, or perfumes after showering on the day of your surgery. Do not use dry shampoo, hair spray, hair gel, or any type of hair products.     No contact lenses, eye make-up, or artificial eyelashes. Remove nail polish, including gel polish, and any artificial, gel, or acrylic nails if possible. Remove all jewelry including rings and body piercing jewelry.     Wear causal clothing that is easy to take on and off. Consider your type of surgery.    Keep any valuables, jewelry, piercings at home. Please bring any specially ordered equipment (sling, braces) if indicated.    Arrange for a  responsible person to drive you to and from the hospital on the day of your surgery. Please confirm the visitor policy for the day of your procedure when you receive your phone call with an arrival time.     Call the surgeon's office with any new illnesses, exposures, or additional questions prior to surgery.    Please reference your “My Surgical Experience Booklet” for additional information to prepare for your upcoming surgery.

## 2025-05-13 ENCOUNTER — ANESTHESIA (OUTPATIENT)
Dept: PERIOP | Facility: HOSPITAL | Age: 35
End: 2025-05-13
Payer: COMMERCIAL

## 2025-05-13 ENCOUNTER — HOSPITAL ENCOUNTER (OUTPATIENT)
Facility: HOSPITAL | Age: 35
Setting detail: OUTPATIENT SURGERY
Discharge: HOME | End: 2025-05-13
Attending: SURGERY | Admitting: SURGERY
Payer: COMMERCIAL

## 2025-05-13 VITALS
WEIGHT: 201 LBS | RESPIRATION RATE: 18 BRPM | HEART RATE: 91 BPM | OXYGEN SATURATION: 93 % | SYSTOLIC BLOOD PRESSURE: 113 MMHG | BODY MASS INDEX: 36.99 KG/M2 | TEMPERATURE: 98.2 F | HEIGHT: 62 IN | DIASTOLIC BLOOD PRESSURE: 68 MMHG

## 2025-05-13 DIAGNOSIS — L73.2 HIDRADENITIS SUPPURATIVA: ICD-10-CM

## 2025-05-13 PROBLEM — R29.818 SUSPECTED SLEEP APNEA: Status: ACTIVE | Noted: 2025-05-13

## 2025-05-13 PROBLEM — R11.2 PONV (POSTOPERATIVE NAUSEA AND VOMITING): Status: ACTIVE | Noted: 2025-05-13

## 2025-05-13 PROBLEM — Z98.890 PONV (POSTOPERATIVE NAUSEA AND VOMITING): Status: ACTIVE | Noted: 2025-05-13

## 2025-05-13 PROBLEM — F12.90 MARIJUANA USER: Status: ACTIVE | Noted: 2025-05-13

## 2025-05-13 PROBLEM — T75.3XXA MOTION SICKNESS: Status: ACTIVE | Noted: 2025-05-13

## 2025-05-13 LAB
EXT PREGNANCY TEST URINE: NEGATIVE
EXT. CONTROL: NORMAL

## 2025-05-13 PROCEDURE — 11450 EXC SKN HDRDNT AX SMPL/NTRM: CPT | Performed by: SURGERY

## 2025-05-13 PROCEDURE — 11450 EXC SKN HDRDNT AX SMPL/NTRM: CPT

## 2025-05-13 PROCEDURE — 81025 URINE PREGNANCY TEST: CPT | Performed by: SURGERY

## 2025-05-13 PROCEDURE — 88304 TISSUE EXAM BY PATHOLOGIST: CPT | Performed by: STUDENT IN AN ORGANIZED HEALTH CARE EDUCATION/TRAINING PROGRAM

## 2025-05-13 RX ORDER — HEPARIN SODIUM 5000 [USP'U]/ML
5000 INJECTION, SOLUTION INTRAVENOUS; SUBCUTANEOUS ONCE
Status: COMPLETED | OUTPATIENT
Start: 2025-05-13 | End: 2025-05-13

## 2025-05-13 RX ORDER — FENTANYL CITRATE/PF 50 MCG/ML
50 SYRINGE (ML) INJECTION
Status: DISCONTINUED | OUTPATIENT
Start: 2025-05-13 | End: 2025-05-13 | Stop reason: HOSPADM

## 2025-05-13 RX ORDER — SODIUM CHLORIDE, SODIUM LACTATE, POTASSIUM CHLORIDE, CALCIUM CHLORIDE 600; 310; 30; 20 MG/100ML; MG/100ML; MG/100ML; MG/100ML
20 INJECTION, SOLUTION INTRAVENOUS CONTINUOUS
Status: DISCONTINUED | OUTPATIENT
Start: 2025-05-13 | End: 2025-05-13 | Stop reason: HOSPADM

## 2025-05-13 RX ORDER — ACETAMINOPHEN 325 MG/1
975 TABLET ORAL ONCE
Status: COMPLETED | OUTPATIENT
Start: 2025-05-13 | End: 2025-05-13

## 2025-05-13 RX ORDER — HYDROMORPHONE HCL IN WATER/PF 6 MG/30 ML
0.2 PATIENT CONTROLLED ANALGESIA SYRINGE INTRAVENOUS
Status: DISCONTINUED | OUTPATIENT
Start: 2025-05-13 | End: 2025-05-13 | Stop reason: HOSPADM

## 2025-05-13 RX ORDER — GINSENG 100 MG
CAPSULE ORAL AS NEEDED
Status: DISCONTINUED | OUTPATIENT
Start: 2025-05-13 | End: 2025-05-13 | Stop reason: HOSPADM

## 2025-05-13 RX ORDER — SCOPOLAMINE 1 MG/3D
1 PATCH, EXTENDED RELEASE TRANSDERMAL
Status: DISCONTINUED | OUTPATIENT
Start: 2025-05-13 | End: 2025-05-13 | Stop reason: HOSPADM

## 2025-05-13 RX ORDER — FENTANYL CITRATE 50 UG/ML
INJECTION, SOLUTION INTRAMUSCULAR; INTRAVENOUS AS NEEDED
Status: DISCONTINUED | OUTPATIENT
Start: 2025-05-13 | End: 2025-05-13

## 2025-05-13 RX ORDER — ONDANSETRON 2 MG/ML
4 INJECTION INTRAMUSCULAR; INTRAVENOUS EVERY 8 HOURS PRN
Status: DISCONTINUED | OUTPATIENT
Start: 2025-05-13 | End: 2025-05-13 | Stop reason: HOSPADM

## 2025-05-13 RX ORDER — CEFAZOLIN SODIUM 2 G/50ML
2000 SOLUTION INTRAVENOUS ONCE
Status: COMPLETED | OUTPATIENT
Start: 2025-05-13 | End: 2025-05-13

## 2025-05-13 RX ORDER — MIDAZOLAM HYDROCHLORIDE 2 MG/2ML
INJECTION, SOLUTION INTRAMUSCULAR; INTRAVENOUS AS NEEDED
Status: DISCONTINUED | OUTPATIENT
Start: 2025-05-13 | End: 2025-05-13

## 2025-05-13 RX ORDER — LIDOCAINE HYDROCHLORIDE 10 MG/ML
INJECTION, SOLUTION EPIDURAL; INFILTRATION; INTRACAUDAL; PERINEURAL AS NEEDED
Status: DISCONTINUED | OUTPATIENT
Start: 2025-05-13 | End: 2025-05-13

## 2025-05-13 RX ORDER — ALBUTEROL SULFATE 0.83 MG/ML
2.5 SOLUTION RESPIRATORY (INHALATION) ONCE AS NEEDED
Status: DISCONTINUED | OUTPATIENT
Start: 2025-05-13 | End: 2025-05-13 | Stop reason: HOSPADM

## 2025-05-13 RX ORDER — ONDANSETRON 2 MG/ML
4 INJECTION INTRAMUSCULAR; INTRAVENOUS ONCE AS NEEDED
Status: DISCONTINUED | OUTPATIENT
Start: 2025-05-13 | End: 2025-05-13 | Stop reason: HOSPADM

## 2025-05-13 RX ORDER — PROPOFOL 10 MG/ML
INJECTION, EMULSION INTRAVENOUS AS NEEDED
Status: DISCONTINUED | OUTPATIENT
Start: 2025-05-13 | End: 2025-05-13

## 2025-05-13 RX ORDER — ONDANSETRON 2 MG/ML
INJECTION INTRAMUSCULAR; INTRAVENOUS AS NEEDED
Status: DISCONTINUED | OUTPATIENT
Start: 2025-05-13 | End: 2025-05-13

## 2025-05-13 RX ORDER — LIDOCAINE HYDROCHLORIDE AND EPINEPHRINE 10; 10 MG/ML; UG/ML
INJECTION, SOLUTION INFILTRATION; PERINEURAL AS NEEDED
Status: DISCONTINUED | OUTPATIENT
Start: 2025-05-13 | End: 2025-05-13 | Stop reason: HOSPADM

## 2025-05-13 RX ORDER — PROMETHAZINE HYDROCHLORIDE 25 MG/ML
12.5 INJECTION, SOLUTION INTRAMUSCULAR; INTRAVENOUS ONCE AS NEEDED
Status: DISCONTINUED | OUTPATIENT
Start: 2025-05-13 | End: 2025-05-13 | Stop reason: HOSPADM

## 2025-05-13 RX ORDER — DEXAMETHASONE SODIUM PHOSPHATE 10 MG/ML
INJECTION, SOLUTION INTRAMUSCULAR; INTRAVENOUS AS NEEDED
Status: DISCONTINUED | OUTPATIENT
Start: 2025-05-13 | End: 2025-05-13

## 2025-05-13 RX ORDER — OXYCODONE AND ACETAMINOPHEN 5; 325 MG/1; MG/1
1 TABLET ORAL EVERY 4 HOURS PRN
Qty: 20 TABLET | Refills: 0 | Status: SHIPPED | OUTPATIENT
Start: 2025-05-13

## 2025-05-13 RX ORDER — SODIUM CHLORIDE, SODIUM LACTATE, POTASSIUM CHLORIDE, CALCIUM CHLORIDE 600; 310; 30; 20 MG/100ML; MG/100ML; MG/100ML; MG/100ML
125 INJECTION, SOLUTION INTRAVENOUS CONTINUOUS
Status: DISCONTINUED | OUTPATIENT
Start: 2025-05-13 | End: 2025-05-13 | Stop reason: HOSPADM

## 2025-05-13 RX ORDER — OXYCODONE AND ACETAMINOPHEN 5; 325 MG/1; MG/1
2 TABLET ORAL EVERY 4 HOURS PRN
Refills: 0 | Status: DISCONTINUED | OUTPATIENT
Start: 2025-05-13 | End: 2025-05-13 | Stop reason: HOSPADM

## 2025-05-13 RX ADMIN — FENTANYL CITRATE 50 MCG: 50 INJECTION INTRAMUSCULAR; INTRAVENOUS at 12:57

## 2025-05-13 RX ADMIN — FENTANYL CITRATE 25 MCG: 50 INJECTION INTRAMUSCULAR; INTRAVENOUS at 13:05

## 2025-05-13 RX ADMIN — DEXAMETHASONE SODIUM PHOSPHATE 10 MG: 10 INJECTION, SOLUTION INTRAMUSCULAR; INTRAVENOUS at 13:07

## 2025-05-13 RX ADMIN — ACETAMINOPHEN 975 MG: 325 TABLET ORAL at 11:10

## 2025-05-13 RX ADMIN — SCOPOLAMINE 1 PATCH: 1.5 PATCH, EXTENDED RELEASE TRANSDERMAL at 12:53

## 2025-05-13 RX ADMIN — FENTANYL CITRATE 25 MCG: 50 INJECTION INTRAMUSCULAR; INTRAVENOUS at 13:03

## 2025-05-13 RX ADMIN — FENTANYL CITRATE 50 MCG: 50 INJECTION INTRAMUSCULAR; INTRAVENOUS at 13:35

## 2025-05-13 RX ADMIN — LIDOCAINE HYDROCHLORIDE 50 MG: 10 INJECTION, SOLUTION EPIDURAL; INFILTRATION; INTRACAUDAL; PERINEURAL at 12:57

## 2025-05-13 RX ADMIN — FENTANYL CITRATE 50 MCG: 50 INJECTION INTRAMUSCULAR; INTRAVENOUS at 13:10

## 2025-05-13 RX ADMIN — PROPOFOL 30 MG: 10 INJECTION, EMULSION INTRAVENOUS at 14:04

## 2025-05-13 RX ADMIN — MIDAZOLAM 2 MG: 1 INJECTION INTRAMUSCULAR; INTRAVENOUS at 12:53

## 2025-05-13 RX ADMIN — SODIUM CHLORIDE, SODIUM LACTATE, POTASSIUM CHLORIDE, AND CALCIUM CHLORIDE 20 ML/HR: .6; .31; .03; .02 INJECTION, SOLUTION INTRAVENOUS at 11:09

## 2025-05-13 RX ADMIN — PROPOFOL 200 MG: 10 INJECTION, EMULSION INTRAVENOUS at 12:57

## 2025-05-13 RX ADMIN — DEXMEDETOMIDINE 12 MCG: 200 INJECTION, SOLUTION INTRAVENOUS at 14:07

## 2025-05-13 RX ADMIN — ONDANSETRON 4 MG: 2 INJECTION INTRAMUSCULAR; INTRAVENOUS at 13:35

## 2025-05-13 RX ADMIN — HEPARIN SODIUM 5000 UNITS: 5000 INJECTION INTRAVENOUS; SUBCUTANEOUS at 11:02

## 2025-05-13 RX ADMIN — CEFAZOLIN SODIUM 2000 MG: 2 SOLUTION INTRAVENOUS at 12:53

## 2025-05-13 NOTE — ANESTHESIA PREPROCEDURE EVALUATION
Procedure:  EXCISION HIDRADENITIS AXILLARY (Bilateral: Axilla)    Relevant Problems   ANESTHESIA   (+) Motion sickness   (+) PONV (postoperative nausea and vomiting)      CARDIO  Intermittent episodes of syncope, patient reports once every few weeks.  She also reports she has been evaluated for this and episodes are attributed to anxiety and tachycardia.   (+) Chest pain   (+) Migraine without aura and without status migrainosus, not intractable      ENDO (within normal limits)      GI/HEPATIC  Confirmed NPO appropriate   (+) Enlarged liver   (-) Gastroesophageal reflux disease      GYN   (-) Currently pregnant      HEMATOLOGY (within normal limits)      NEURO/PSYCH   (+) Anxiety   (+) Aphasia   (+) Migraine without aura and without status migrainosus, not intractable      PULMONARY  Last use of rescue inhaler months ago   (+) Asthma      Behavioral Health   (+) Bipolar disorder, current episode mixed, mild (HCC)   (+) Marijuana user   (+) Nicotine dependence      Neurology/Sleep   (+) Syncope      Dermatology   (+) Hidradenitis suppurativa      Other   (+) BMI 36.0-36.9,adult   (+) Suspected sleep apnea        Physical Exam    Airway    Mallampati score: II  TM Distance: >3 FB  Neck ROM: full     Dental   No notable dental hx     Cardiovascular  Rhythm: regular, Rate: normal    Pulmonary   Breath sounds clear to auscultation, No wheezes    Other Findings  post-pubertal.      Anesthesia Plan  ASA Score- 3     Anesthesia Type- general with ASA Monitors.         Additional Monitors:     Airway Plan:            Plan Factors-Exercise tolerance (METS): >4 METS.    Chart reviewed.   Existing labs reviewed.     Patient is a current smoker.              Induction- intravenous.    Postoperative Plan- Plan for postoperative opioid use. Planned trial extubation        Informed Consent- Anesthetic plan and risks discussed with patient.  I personally reviewed this patient with the CRNA. Discussed and agreed on the Anesthesia  Plan with the CRNA..      NPO Status:  Vitals Value Taken Time   Date of last liquid 05/12/25 05/13/25 1054   Time of last liquid 2330 05/13/25 1054   Date of last solid 05/12/25 05/13/25 1054   Time of last solid 2330 05/13/25 1054       Lab Results   Component Value Date    WBC 7.00 12/10/2024    HGB 12.3 12/10/2024    HCT 36.9 12/10/2024    MCV 84 12/10/2024     12/10/2024     Lab Results   Component Value Date    SODIUM 138 12/10/2024    K 3.2 (L) 12/10/2024     12/10/2024    CO2 23 12/10/2024    BUN 9 12/10/2024    CREATININE 0.67 12/10/2024    GLUC 130 12/10/2024    CALCIUM 8.9 12/10/2024     Lab Results   Component Value Date    ALT 15 06/25/2024    AST 15 06/25/2024    ALKPHOS 57 06/25/2024     Lab Results   Component Value Date    INR 1.0 09/11/2023    INR 0.9 12/07/2022

## 2025-05-13 NOTE — INTERVAL H&P NOTE
H&P reviewed. After examining the patient I find no changes in the patients condition since the H&P had been written.    Vitals:    05/13/25 1052   BP: 129/75   Pulse: 81   Resp: 20   Temp: (!) 97.4 °F (36.3 °C)   SpO2: 96%

## 2025-05-13 NOTE — ANESTHESIA POSTPROCEDURE EVALUATION
Post-Op Assessment Note    CV Status:  Stable    Pain management: adequate       Mental Status:  Alert and awake   Hydration Status:  Stable   PONV Controlled:  None   Airway Patency:  Patent     Post Op Vitals Reviewed: Yes    No anethesia notable event occurred.    Staff: Anesthesiologist           Last Filed PACU Vitals:  Vitals Value Taken Time   Temp 99 °F (37.2 °C) 05/13/25 1413   Pulse 91 05/13/25 1446   /74 05/13/25 1443   Resp 25 05/13/25 1446   SpO2 96 % 05/13/25 1446   Vitals shown include unfiled device data.    Modified Luciana:     Vitals Value Taken Time   Activity 2 05/13/25 1440   Respiration 2 05/13/25 1440   Circulation 2 05/13/25 1440   Consciousness 1 05/13/25 1440   Oxygen Saturation 2 05/13/25 1440     Modified Luciana Score: 9

## 2025-05-13 NOTE — OP NOTE
OPERATIVE REPORT  PATIENT NAME: Rafaela Perse    :  1990  MRN: [id number]  Pt Location: [facility] OR ROOM 02    SURGERY DATE: 2025    Surgeons and Role:     * Enrique Diaz DO - Primary     * Jonathan Dale PA-C - Assisting    Preop Diagnosis:  Hidradenitis suppurativa [L73.2]    Post-Op Diagnosis Codes:     * Hidradenitis suppurativa [L73.2]    Procedure(s):  Bilateral - EXCISION HIDRADENITIS AXILLARY    Specimen(s):  ID Type Source Tests Collected by Time Destination   1 : HIDRADENITIS SUPPURATIVA OF LEFT AXILLA Tissue Hidradenitis TISSUE EXAM Enrique Diaz,  2025 1322    2 : HIDRADENITIS SUPPURATIVA OF RIGHT AXILLA Tissue Hidradenitis TISSUE EXAM Enrique Diaz,  2025 1337        Estimated Blood Loss:   Minimal    Drains:  * No LDAs found *    Anesthesia Type:   General    Operative Indications:  Hidradenitis suppurativa [L73.2]      Operative Findings:  Bilateral active hidradenitis suppurativa with purulent discharge.  Bilateral excisions of the axilla of the active skin lesions noted.  The left axilla incision/wound measured approximately 12.5 x 3 cm.  The right axilla incision/wound measured 9 x 3.5 cm.      Complications:   None    Procedure and Technique:  Patient brought to operating and placed in supine position.  All the regular monitor vices were then connected.  The patient underwent general esthesia with LMA intubation without complication.  The patient received preoperative antibiotics.  The patient received subcutaneous heparin addition to bilateral lower sequential compression devices for DVT prophylaxis.  The patient was then prepped draped usual sterile fashion.  Timeout was performed to verify correct patient, procedure, position, site.  I first addressed the left axilla.  There is multiple open areas with noted seropurulent drainage.  Evidence of scarring and previous incision and drainage procedures in the past.  It was decided to excise the entire inflamed and  scarred area.  An elliptical incision was made using the 15 blade scalpel.  This was deepened down to the dermis just into the subcutaneous fat.  Given these lesions are superficial no additional dissection deeper was attempted.  The skin including dermis and subcutaneous fat was then excised circumferentially and passed off the field.  The cavity was irrigated normal saline.  Hemostasis was achieved, a simple electrocautery and direct pressure.  Once hemostasis was achieved the wound was closed with interrupted 3-0 nylon sutures placed in a vertical mattress fashion.  The wound was dressed with bacitracin and a dry sterile dressing.  This exact same procedure was then repeated for the right axilla.    The patient tolerated the procedure well taken the postanesthesia care in stable condition with all lap, needle, and is counts were correct.     I was present for the entire procedure., A qualified resident physician was not available., and A physician assistant was required during the procedure for retraction, tissue handling, dissection and suturing.    Patient Disposition:  PACU              SIGNATURE: Enrique Diaz DO  DATE: May 13, 2025  TIME: 2:07 PM

## 2025-05-13 NOTE — DISCHARGE INSTR - AVS FIRST PAGE
Follow with Dr. Diaz in 2 weeks as per appointment.    Regular diet as tolerated.    Low intensity activity as tolerated, no be lifting, nothing greater than 10 pounds for 4 weeks.  Also would avoid raising arms above shoulder level for at least 2 weeks.    Dressing may be removed on Thursday.  May shower on Thursday.  No baths or swimming.  I would recommend keeping the area covered as it will likely drain which is expected.    If develop fever, nausea vancomycin pain, redness or drainage with incision call the office or go to the ER.

## 2025-05-13 NOTE — ANESTHESIA POSTPROCEDURE EVALUATION
Post-Op Assessment Note    CV Status:  Stable  Pain Score: 0    Pain management: satisfactory to patient       Mental Status:  Awake and sleepy   Hydration Status:  Euvolemic   PONV Controlled:  Controlled   Airway Patency:  Patent     Post Op Vitals Reviewed: Yes    No anethesia notable event occurred.    Staff: CRNA           Last Filed PACU Vitals:  Vitals Value Taken Time   Temp 99.0    Pulse 105 05/13/25 1415   /76 05/13/25 1414   Resp 27 05/13/25 1415   SpO2 100 % 05/13/25 1415   Vitals shown include unfiled device data.

## 2025-05-19 ENCOUNTER — TELEPHONE (OUTPATIENT)
Dept: SURGERY | Facility: CLINIC | Age: 35
End: 2025-05-19

## 2025-05-19 ENCOUNTER — TELEPHONE (OUTPATIENT)
Age: 35
End: 2025-05-19

## 2025-05-19 PROCEDURE — 88305 TISSUE EXAM BY PATHOLOGIST: CPT | Performed by: STUDENT IN AN ORGANIZED HEALTH CARE EDUCATION/TRAINING PROGRAM

## 2025-05-19 NOTE — LETTER
May 19, 2025     Patient: Rafaela Peres  YOB: 1990  Date of Visit: 05/13/2025      To Whom it May Concern:    Rafaela Peres is under my professional care. Rafaela had a procedure on 5/13/2025.  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     If you have any questions or concerns, please don't hesitate to call.         Sincerely,      Dr Enrique Diaz

## 2025-05-19 NOTE — TELEPHONE ENCOUNTER
Patient is asking for a return to work note for May 20,2025.   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   If  is okay with this, please send note to patient via [employer].  Thank you

## 2025-05-19 NOTE — TELEPHONE ENCOUNTER
Note generated.   Bacteremia/ R/O acute Cholecystitis/ R/O UTI/ R/O colitis Gallstone pancreatitis Abdominal pain

## 2025-05-20 ENCOUNTER — RESULTS FOLLOW-UP (OUTPATIENT)
Dept: SURGERY | Facility: CLINIC | Age: 35
End: 2025-05-20

## 2025-05-22 ENCOUNTER — TELEPHONE (OUTPATIENT)
Dept: SURGERY | Facility: CLINIC | Age: 35
End: 2025-05-22

## (undated) DEVICE — MEDI-VAC YANK SUCT HNDL W/TPRD BULBOUS TIP: Brand: CARDINAL HEALTH

## (undated) DEVICE — TUBING SUCTION 5MM X 12 FT

## (undated) DEVICE — PLUMEPEN PRO 10FT

## (undated) DEVICE — SUT ETHILON 3-0 PS-1 18 IN 1663G

## (undated) DEVICE — SYRINGE 10ML LL

## (undated) DEVICE — DISPOSABLE OR TOWEL: Brand: CARDINAL HEALTH

## (undated) DEVICE — GAUZE SPONGES,16 PLY: Brand: CURITY

## (undated) DEVICE — GLOVE INDICATOR PI UNDERGLOVE SZ 7 BLUE

## (undated) DEVICE — PAD GROUNDING DUAL ADULT

## (undated) DEVICE — NEEDLE 25G X 1 1/2

## (undated) DEVICE — BETHLEHEM UNIVERSAL MINOR GEN: Brand: CARDINAL HEALTH

## (undated) DEVICE — KERLIX BANDAGE ROLL: Brand: KERLIX

## (undated) DEVICE — POV-IOD SOLUTION 4OZ BT

## (undated) DEVICE — GLOVE SRG BIOGEL ECLIPSE 7